# Patient Record
Sex: FEMALE | Race: WHITE | NOT HISPANIC OR LATINO | Employment: OTHER | URBAN - METROPOLITAN AREA
[De-identification: names, ages, dates, MRNs, and addresses within clinical notes are randomized per-mention and may not be internally consistent; named-entity substitution may affect disease eponyms.]

---

## 2017-05-23 ENCOUNTER — TRANSCRIBE ORDERS (OUTPATIENT)
Dept: ADMINISTRATIVE | Facility: HOSPITAL | Age: 77
End: 2017-05-23

## 2017-05-23 ENCOUNTER — HOSPITAL ENCOUNTER (OUTPATIENT)
Dept: ULTRASOUND IMAGING | Facility: HOSPITAL | Age: 77
Discharge: HOME | End: 2017-05-23
Admitting: NURSE PRACTITIONER

## 2017-05-23 DIAGNOSIS — M25.561 POSTERIOR RIGHT KNEE PAIN: Primary | ICD-10-CM

## 2017-05-23 DIAGNOSIS — M25.561 POSTERIOR RIGHT KNEE PAIN: ICD-10-CM

## 2017-05-23 PROCEDURE — 76882 US LMTD JT/FCL EVL NVASC XTR: CPT

## 2017-06-15 ENCOUNTER — TRANSCRIBE ORDERS (OUTPATIENT)
Dept: MAMMOGRAPHY | Facility: HOSPITAL | Age: 77
End: 2017-06-15

## 2017-06-15 DIAGNOSIS — Z12.31 VISIT FOR SCREENING MAMMOGRAM: Primary | ICD-10-CM

## 2017-07-11 ENCOUNTER — HOSPITAL ENCOUNTER (OUTPATIENT)
Dept: MAMMOGRAPHY | Facility: HOSPITAL | Age: 77
Discharge: HOME | End: 2017-07-11
Attending: FAMILY MEDICINE | Admitting: FAMILY MEDICINE

## 2017-07-11 DIAGNOSIS — Z12.31 VISIT FOR SCREENING MAMMOGRAM: ICD-10-CM

## 2017-07-11 PROCEDURE — G0202 SCR MAMMO BI INCL CAD: HCPCS | Performed by: RADIOLOGY

## 2017-07-11 PROCEDURE — G0202 SCR MAMMO BI INCL CAD: HCPCS

## 2017-07-11 PROCEDURE — 77063 BREAST TOMOSYNTHESIS BI: CPT | Performed by: RADIOLOGY

## 2017-07-11 PROCEDURE — 77063 BREAST TOMOSYNTHESIS BI: CPT

## 2017-07-20 ENCOUNTER — TRANSCRIBE ORDERS (OUTPATIENT)
Dept: MAMMOGRAPHY | Facility: HOSPITAL | Age: 77
End: 2017-07-20

## 2017-07-20 ENCOUNTER — HOSPITAL ENCOUNTER (OUTPATIENT)
Dept: ULTRASOUND IMAGING | Facility: HOSPITAL | Age: 77
Discharge: HOME | End: 2017-07-20
Admitting: FAMILY MEDICINE

## 2017-07-20 DIAGNOSIS — R92.8 ABNORMAL MAMMOGRAM: Primary | ICD-10-CM

## 2017-07-20 DIAGNOSIS — R92.8 ABNORMAL MAMMOGRAM: ICD-10-CM

## 2017-07-20 PROCEDURE — 76642 ULTRASOUND BREAST LIMITED: CPT

## 2017-07-20 PROCEDURE — 76642 ULTRASOUND BREAST LIMITED: CPT | Performed by: RADIOLOGY

## 2017-07-26 ENCOUNTER — HOSPITAL ENCOUNTER (OUTPATIENT)
Dept: GENERAL RADIOLOGY | Facility: HOSPITAL | Age: 77
Discharge: HOME | End: 2017-07-26
Attending: FAMILY MEDICINE | Admitting: FAMILY MEDICINE

## 2017-07-26 ENCOUNTER — TRANSCRIBE ORDERS (OUTPATIENT)
Dept: ADMINISTRATIVE | Facility: HOSPITAL | Age: 77
End: 2017-07-26

## 2017-07-26 DIAGNOSIS — M25.531 RIGHT WRIST PAIN: Primary | ICD-10-CM

## 2017-07-26 DIAGNOSIS — M25.531 RIGHT WRIST PAIN: ICD-10-CM

## 2017-07-26 PROCEDURE — 73110 X-RAY EXAM OF WRIST: CPT

## 2017-08-30 ENCOUNTER — OFFICE VISIT (OUTPATIENT)
Dept: SLEEP MEDICINE | Facility: HOSPITAL | Age: 77
End: 2017-08-30

## 2017-08-30 VITALS
OXYGEN SATURATION: 96 % | SYSTOLIC BLOOD PRESSURE: 130 MMHG | WEIGHT: 137 LBS | HEIGHT: 56 IN | HEART RATE: 88 BPM | BODY MASS INDEX: 30.82 KG/M2 | DIASTOLIC BLOOD PRESSURE: 74 MMHG

## 2017-08-30 DIAGNOSIS — G47.33 OBSTRUCTIVE SLEEP APNEA, ADULT: Primary | ICD-10-CM

## 2017-08-30 PROCEDURE — 99213 OFFICE O/P EST LOW 20 MIN: CPT | Performed by: INTERNAL MEDICINE

## 2017-08-30 NOTE — PATIENT INSTRUCTIONS
Sleep Apnea  Sleep apnea is a condition in which breathing pauses or becomes shallow during sleep. Episodes of sleep apnea usually last 10 seconds or longer, and they may occur as many as 20 times an hour. Sleep apnea disrupts your sleep and keeps your body from getting the rest that it needs. This condition can increase your risk of certain health problems, including:  · Heart attack.  · Stroke.  · Obesity.  · Diabetes.  · Heart failure.  · Irregular heartbeat.  There are three kinds of sleep apnea:  · Obstructive sleep apnea. This kind is caused by a blocked or collapsed airway.  · Central sleep apnea. This kind happens when the part of the brain that controls breathing does not send the correct signals to the muscles that control breathing.  · Mixed sleep apnea. This is a combination of obstructive and central sleep apnea.  CAUSES  The most common cause of this condition is a collapsed or blocked airway. An airway can collapse or become blocked if:  · Your throat muscles are abnormally relaxed.  · Your tongue and tonsils are larger than normal.  · You are overweight.  · Your airway is smaller than normal.  RISK FACTORS  This condition is more likely to develop in people who:  · Are overweight.  · Smoke.  · Have a smaller than normal airway.  · Are elderly.  · Are male.  · Drink alcohol.  · Take sedatives or tranquilizers.  · Have a family history of sleep apnea.  SYMPTOMS  Symptoms of this condition include:  · Trouble staying asleep.  · Daytime sleepiness and tiredness.  · Irritability.  · Loud snoring.  · Morning headaches.  · Trouble concentrating.  · Forgetfulness.  · Decreased interest in sex.  · Unexplained sleepiness.  · Mood swings.  · Personality changes.  · Feelings of depression.  · Waking up often during the night to urinate.  · Dry mouth.  · Sore throat.  DIAGNOSIS  This condition may be diagnosed with:  · A medical history.  · A physical exam.  · A series of tests that are done while you are  sleeping (sleep study). These tests are usually done in a sleep lab, but they may also be done at home.  TREATMENT  Treatment for this condition aims to restore normal breathing and to ease symptoms during sleep. It may involve managing health issues that can affect breathing, such as high blood pressure or obesity. Treatment may include:  · Sleeping on your side.  · Using a decongestant if you have nasal congestion.  · Avoiding the use of depressants, including alcohol, sedatives, and narcotics.  · Losing weight if you are overweight.  · Making changes to your diet.  · Quitting smoking.  · Using a device to open your airway while you sleep, such as:    An oral appliance. This is a custom-made mouthpiece that shifts your lower jaw forward.    A continuous positive airway pressure (CPAP) device. This device delivers oxygen to your airway through a mask.    A nasal expiratory positive airway pressure (EPAP) device. This device has valves that you put into each nostril.    A bi-level positive airway pressure (BPAP) device. This device delivers oxygen to your airway through a mask.  · Surgery if other treatments do not work. During surgery, excess tissue is removed to create a wider airway.  It is important to get treatment for sleep apnea. Without treatment, this condition can lead to:  · High blood pressure.  · Coronary artery disease.  · (Men) An inability to achieve or maintain an erection (impotence).  · Reduced thinking abilities.  HOME CARE INSTRUCTIONS  · Make any lifestyle changes that your health care provider recommends.  · Eat a healthy, well-balanced diet.  · Take over-the-counter and prescription medicines only as told by your health care provider.  · Avoid using depressants, including alcohol, sedatives, and narcotics.  · Take steps to lose weight if you are overweight.  · If you were given a device to open your airway while you sleep, use it only as told by your health care provider.  · Do not use any  tobacco products, such as cigarettes, chewing tobacco, and e-cigarettes. If you need help quitting, ask your health care provider.  · Keep all follow-up visits as told by your health care provider. This is important.  SEEK MEDICAL CARE IF:  · The device that you received to open your airway during sleep is uncomfortable or does not seem to be working.  · Your symptoms do not improve.  · Your symptoms get worse.  SEEK IMMEDIATE MEDICAL CARE IF:  · You develop chest pain.  · You develop shortness of breath.  · You develop discomfort in your back, arms, or stomach.  · You have trouble speaking.  · You have weakness on one side of your body.  · You have drooping in your face.  These symptoms may represent a serious problem that is an emergency. Do not wait to see if the symptoms will go away. Get medical help right away. Call your local emergency services (911 in the [place] Do not drive yourself to the hospital.     This information is not intended to replace advice given to you by your health care provider. Make sure you discuss any questions you have with your health care provider.     Document Released: 12/08/2003 Document Revised: 04/10/2017 Document Reviewed: 09/26/2016  [employer] Interactive Patient Education ©2017 [employer]

## 2017-08-30 NOTE — PROGRESS NOTES
Subjective   Mary Irving is a 76 y.o. female is here today for follow-up.  She is followed here with obstructive sleep apnea.  Her primary care physician is Dr. Sanchez.    History of Present Illness  Patient was last seen in this clinic August 25, 2016 by .  She had severe obstructive sleep apnea on her previous sleep study and has obesity.  She says she's been doing well with her machine.  She has occasional changes in humidifier level says she still feels somewhat tired during the day.  She denies any real problems however with the mask or machine.  Past Medical History:   Diagnosis Date   • Arthritis    • Disease of thyroid gland    • Hypertension        Past Surgical History:   Procedure Laterality Date   • HYSTERECTOMY      AGE 42   • OOPHORECTOMY Bilateral     AGE 42           Current Outpatient Prescriptions:   •  atorvastatin (LIPITOR) 40 MG tablet, , Disp: , Rfl:   •  buPROPion XL (WELLBUTRIN XL) 150 MG 24 hr tablet, , Disp: , Rfl:   •  FLUoxetine (PROzac) 20 MG capsule, , Disp: , Rfl:   •  levothyroxine (SYNTHROID, LEVOTHROID) 88 MCG tablet, , Disp: , Rfl:   •  losartan-hydrochlorothiazide (HYZAAR) 100-25 MG per tablet, , Disp: , Rfl:   •  VENTOLIN  (90 BASE) MCG/ACT inhaler, , Disp: , Rfl:     Allergies   Allergen Reactions   • Sulfa Antibiotics        The following portions of the patient's history were reviewed and updated as appropriate: allergies, current medications and problem list.    Review of Systems   Constitutional: Negative.    HENT: Positive for congestion, postnasal drip and sinus pressure.    Eyes: Negative.    Respiratory: Positive for wheezing.    Cardiovascular: Positive for palpitations.   Gastrointestinal: Negative.    Endocrine: Positive for cold intolerance.   Genitourinary: Positive for frequency.   Musculoskeletal: Positive for arthralgias and joint swelling.   Skin: Negative.    Allergic/Immunologic: Positive for environmental allergies.   Neurological:  "Positive for headaches.   Hematological: Negative.    Psychiatric/Behavioral: Negative.      [place] scores 14/24.  Objective     /74  Pulse 88  Ht 56\" (142.2 cm)  Wt 137 lb (62.1 kg)  LMP  (LMP Unknown)  SpO2 96%  BMI 30.71 kg/m2    Physical Exam   Constitutional: She is oriented to person, place, and time. She appears well-developed and well-nourished.   She is obese.   HENT:   Head: Normocephalic and atraumatic.   She has Mallampati class II anatomy.   Eyes: EOM are normal. Pupils are equal, round, and reactive to light.   Neck: Normal range of motion. Neck supple.   Cardiovascular: Normal rate, regular rhythm and normal heart sounds.    Pulmonary/Chest: Effort normal and breath sounds normal.   Abdominal: Soft. Bowel sounds are normal.   Musculoskeletal: Normal range of motion. She exhibits no edema.   Neurological: She is alert and oriented to person, place, and time.   Skin: Skin is warm and dry.   Psychiatric: She has a normal mood and affect. Her behavior is normal.    download from her machine for the past 6 months shows usage 98% the time.  She is using 8 hours 12 Max per day.  Her AHI is 2.9 which is normal.  Her 90% pressure is 10.  Her humidity level does seem to change at times.  The patient states she does not know how to adjust      Assessment/Plan   Mary was seen today for follow-up.    Diagnoses and all orders for this visit:    Obstructive sleep apnea, adult  -     CPAP Therapy    Patient seems be doing fairly well with her machine.  We will continue on her current pressures.  She seems to have good control of her respiratory events.  She does still complain of some sleepiness but cannot identify anything else that seems to be disrupting her sleep.  She is discussed with her [employer] company had a make sure her humidifier is adjusted properly.  We will renew her supplies.  She is encouraged to lose weight.  She is encouraged to avoid alcohol and sedatives close to bedtime.  She is " encouraged practice lateral position sleep.  We will see her back in 1 year.             Barron Colunga MD [facility]  Sleep Medicine  Pulmonary and Critical Care Medicine      08/30/17  3:12 PM

## 2017-08-31 ENCOUNTER — HOSPITAL ENCOUNTER (OUTPATIENT)
Dept: MAMMOGRAPHY | Facility: HOSPITAL | Age: 77
Discharge: HOME | End: 2017-08-31
Attending: FAMILY MEDICINE | Admitting: FAMILY MEDICINE

## 2017-08-31 ENCOUNTER — HOSPITAL ENCOUNTER (OUTPATIENT)
Dept: ULTRASOUND IMAGING | Facility: HOSPITAL | Age: 77
Discharge: HOME | End: 2017-08-31

## 2017-08-31 DIAGNOSIS — R92.8 ABNORMAL MAMMOGRAM: ICD-10-CM

## 2017-08-31 PROCEDURE — G0204 DX MAMMO INCL CAD BI: HCPCS

## 2017-08-31 PROCEDURE — G0204 DX MAMMO INCL CAD BI: HCPCS | Performed by: RADIOLOGY

## 2017-08-31 PROCEDURE — 76642 ULTRASOUND BREAST LIMITED: CPT | Performed by: RADIOLOGY

## 2017-08-31 PROCEDURE — 76642 ULTRASOUND BREAST LIMITED: CPT

## 2017-08-31 PROCEDURE — G0279 TOMOSYNTHESIS, MAMMO: HCPCS | Performed by: RADIOLOGY

## 2017-08-31 PROCEDURE — G0279 TOMOSYNTHESIS, MAMMO: HCPCS

## 2018-03-27 ENCOUNTER — HOSPITAL ENCOUNTER (OUTPATIENT)
Dept: GENERAL RADIOLOGY | Facility: HOSPITAL | Age: 78
Discharge: HOME | End: 2018-03-27
Admitting: NURSE PRACTITIONER

## 2018-03-27 ENCOUNTER — TRANSCRIBE ORDERS (OUTPATIENT)
Dept: ADMINISTRATIVE | Facility: HOSPITAL | Age: 78
End: 2018-03-27

## 2018-03-27 DIAGNOSIS — M25.531 RIGHT WRIST PAIN: ICD-10-CM

## 2018-03-27 DIAGNOSIS — M25.531 RIGHT WRIST PAIN: Primary | ICD-10-CM

## 2018-03-27 PROCEDURE — 73110 X-RAY EXAM OF WRIST: CPT

## 2018-05-31 ENCOUNTER — TRANSCRIBE ORDERS (OUTPATIENT)
Dept: ADMINISTRATIVE | Facility: HOSPITAL | Age: 78
End: 2018-05-31

## 2018-05-31 DIAGNOSIS — Z12.31 VISIT FOR SCREENING MAMMOGRAM: Primary | ICD-10-CM

## 2018-07-13 ENCOUNTER — HOSPITAL ENCOUNTER (OUTPATIENT)
Dept: MAMMOGRAPHY | Facility: HOSPITAL | Age: 78
Discharge: HOME | End: 2018-07-13
Attending: FAMILY MEDICINE | Admitting: FAMILY MEDICINE

## 2018-07-13 DIAGNOSIS — Z12.31 VISIT FOR SCREENING MAMMOGRAM: ICD-10-CM

## 2018-07-13 PROCEDURE — 77063 BREAST TOMOSYNTHESIS BI: CPT | Performed by: RADIOLOGY

## 2018-07-13 PROCEDURE — 77067 SCR MAMMO BI INCL CAD: CPT

## 2018-07-13 PROCEDURE — 77063 BREAST TOMOSYNTHESIS BI: CPT

## 2018-07-13 PROCEDURE — 77067 SCR MAMMO BI INCL CAD: CPT | Performed by: RADIOLOGY

## 2018-09-04 ENCOUNTER — OFFICE VISIT (OUTPATIENT)
Dept: SLEEP MEDICINE | Facility: HOSPITAL | Age: 78
End: 2018-09-04

## 2018-09-04 VITALS
DIASTOLIC BLOOD PRESSURE: 73 MMHG | WEIGHT: 128.6 LBS | HEIGHT: 58 IN | HEART RATE: 70 BPM | OXYGEN SATURATION: 96 % | SYSTOLIC BLOOD PRESSURE: 139 MMHG | BODY MASS INDEX: 26.99 KG/M2

## 2018-09-04 DIAGNOSIS — G47.33 OSA (OBSTRUCTIVE SLEEP APNEA): Primary | ICD-10-CM

## 2018-09-04 PROCEDURE — 99213 OFFICE O/P EST LOW 20 MIN: CPT | Performed by: NURSE PRACTITIONER

## 2018-09-04 RX ORDER — OMEPRAZOLE 40 MG/1
40 CAPSULE, DELAYED RELEASE ORAL DAILY
COMMUNITY

## 2018-09-04 NOTE — PROGRESS NOTES
Subjective: Follow-up        Chief Complaint:   Chief Complaint   Patient presents with   • Follow-up       HPI:    Mary Irving is a 77 y.o. female here for follow-up of juan josé.  Patient states she is doing very well with sleep apnea therapy.  Last appointment she was seen on 8/30/2017.  Her primary care physician is Domingo Sanchez M.D.  She is sleeping approximately 8 hours nightly and does feel rested upon awakening.  She intermittently naps during the day and and also feels refreshed at that time as well.  She is having no problems with her CPAP therapy or current pressures.  [place] score 12/24.    Current medications are:   Current Outpatient Prescriptions:   •  atorvastatin (LIPITOR) 40 MG tablet, , Disp: , Rfl:   •  buPROPion XL (WELLBUTRIN XL) 150 MG 24 hr tablet, , Disp: , Rfl:   •  FLUoxetine (PROzac) 20 MG capsule, , Disp: , Rfl:   •  levothyroxine (SYNTHROID, LEVOTHROID) 88 MCG tablet, , Disp: , Rfl:   •  losartan-hydrochlorothiazide (HYZAAR) 100-25 MG per tablet, , Disp: , Rfl:   •  metFORMIN (GLUCOPHAGE) 500 MG tablet, Take 500 mg by mouth 2 (Two) Times a Day With Meals., Disp: , Rfl:   •  omeprazole (priLOSEC) 40 MG capsule, Take 40 mg by mouth Daily., Disp: , Rfl:   •  VENTOLIN  (90 BASE) MCG/ACT inhaler, , Disp: , Rfl: .      The patient's relevant past medical, surgical, family and social history were reviewed and updated in Epic as appropriate.       Review of Systems   Constitutional: Negative for fatigue.   HENT: Positive for congestion, postnasal drip and rhinorrhea.    Eyes: Positive for visual disturbance.   Respiratory: Positive for apnea, cough, shortness of breath and wheezing.    Cardiovascular: Positive for palpitations.   Musculoskeletal: Positive for arthralgias and back pain.   Allergic/Immunologic: Positive for environmental allergies.   Neurological: Positive for headaches.   Hematological: Bruises/bleeds easily.   Psychiatric/Behavioral: Positive for dysphoric mood and sleep  disturbance. The patient is nervous/anxious.    All other systems reviewed and are negative.        Objective:    Physical Exam   Constitutional: She is oriented to person, place, and time.   HENT:   Head: Normocephalic.   Eyes: Conjunctivae are normal.   Neck: Neck supple. No thyromegaly present.   Cardiovascular: Normal rate and regular rhythm.    Pulmonary/Chest: Effort normal and breath sounds normal.   Lymphadenopathy:     She has no cervical adenopathy.   Neurological: She is alert and oriented to person, place, and time.   Skin: Skin is warm and dry.   Psychiatric: She has a normal mood and affect. Her behavior is normal. Judgment and thought content normal.   Nursing note and vitals reviewed.    Compliance and download reviewed with patient.  Her greater than 4 hour usage is 96.1%.  AHI controlled at 4.5.  90% pressure 10.4 cm H2O.    ASSESSMENT/PLAN    Mary was seen today for follow-up.    Diagnoses and all orders for this visit:    ZHAO (obstructive sleep apnea)  -     CPAP Therapy            1. Counseled patient regarding multimodal approach with healthy nutrition, healthy sleep, regular physical activity, social activities, counseling, and medications. Encouraged to practice lateral sleep position. Avoid alcohol and sedatives close to bedtime.  2. Refill supplies ×1 year.  3. Return to clinic one year or sooner as symptoms warrant.    I have reviewed the results of my evaluation and impression and discussed my recommendations in detail with the patient.      Signed by  VALENTINA Ramos    September 4, 2018      CC: Domingo Sanchez MD          No ref. provider found

## 2018-09-04 NOTE — PATIENT INSTRUCTIONS

## 2019-02-04 ENCOUNTER — LAB REQUISITION (OUTPATIENT)
Dept: LAB | Facility: HOSPITAL | Age: 79
End: 2019-02-04

## 2019-02-04 DIAGNOSIS — M20.41 OTHER HAMMER TOE(S) (ACQUIRED), RIGHT FOOT: ICD-10-CM

## 2019-02-04 LAB — POTASSIUM BLDA-SCNC: 3.86 MMOL/L (ref 3.5–5.3)

## 2019-02-04 PROCEDURE — 84132 ASSAY OF SERUM POTASSIUM: CPT | Performed by: PODIATRIST

## 2019-08-26 ENCOUNTER — TRANSCRIBE ORDERS (OUTPATIENT)
Dept: ADMINISTRATIVE | Facility: HOSPITAL | Age: 79
End: 2019-08-26

## 2019-08-26 DIAGNOSIS — Z12.31 VISIT FOR SCREENING MAMMOGRAM: Primary | ICD-10-CM

## 2019-09-20 ENCOUNTER — OFFICE VISIT (OUTPATIENT)
Dept: SLEEP MEDICINE | Facility: HOSPITAL | Age: 79
End: 2019-09-20

## 2019-09-20 VITALS
HEIGHT: 58 IN | HEART RATE: 67 BPM | SYSTOLIC BLOOD PRESSURE: 131 MMHG | OXYGEN SATURATION: 96 % | BODY MASS INDEX: 26.13 KG/M2 | DIASTOLIC BLOOD PRESSURE: 74 MMHG | WEIGHT: 124.5 LBS

## 2019-09-20 DIAGNOSIS — G47.33 OSA (OBSTRUCTIVE SLEEP APNEA): Primary | ICD-10-CM

## 2019-09-20 PROCEDURE — 99212 OFFICE O/P EST SF 10 MIN: CPT | Performed by: NURSE PRACTITIONER

## 2019-09-20 NOTE — PROGRESS NOTES
"    Chief Complaint:   Chief Complaint   Patient presents with   • Sleep Apnea       HPI:    Mary Irving is a 78 y.o. female here for follow-up of sleep apnea.  Patient was last seen 9/4/2018.  Patient states she does well with CPAP therapy.  Patient sleeping 8 to 10 hours nightly is initially rested upon awakening.  Patient has an [place] score of 14/24.  Patient states she \"often feels tired.\"  She is compliant with CPAP and we have discussed the possibility of a low-dose stimulant.  Patient declines.  Patient does wish to continue CPAP.        Current medications are:   Current Outpatient Medications:   •  atorvastatin (LIPITOR) 40 MG tablet, , Disp: , Rfl:   •  buPROPion XL (WELLBUTRIN XL) 150 MG 24 hr tablet, , Disp: , Rfl:   •  FLUoxetine (PROzac) 20 MG capsule, , Disp: , Rfl:   •  levothyroxine (SYNTHROID, LEVOTHROID) 88 MCG tablet, , Disp: , Rfl:   •  losartan-hydrochlorothiazide (HYZAAR) 100-25 MG per tablet, , Disp: , Rfl:   •  metFORMIN (GLUCOPHAGE) 500 MG tablet, Take 500 mg by mouth 2 (Two) Times a Day With Meals., Disp: , Rfl:   •  omeprazole (priLOSEC) 40 MG capsule, Take 40 mg by mouth Daily., Disp: , Rfl:   •  VENTOLIN  (90 BASE) MCG/ACT inhaler, , Disp: , Rfl: .      The patient's relevant past medical, surgical, family and social history were reviewed and updated in Epic as appropriate.       Review of Systems   Constitutional: Positive for fatigue.   Eyes: Positive for visual disturbance.   Respiratory: Positive for apnea, cough, shortness of breath and wheezing.    Cardiovascular: Positive for palpitations.   Musculoskeletal: Positive for arthralgias and back pain.   Allergic/Immunologic: Positive for environmental allergies.   Neurological: Positive for headaches.   Hematological: Bruises/bleeds easily.   Psychiatric/Behavioral: Positive for dysphoric mood and sleep disturbance. The patient is nervous/anxious.    All other systems reviewed and are " negative.        Objective:    Physical Exam   Constitutional: She is oriented to person, place, and time. She appears well-developed and well-nourished.   HENT:   Head: Normocephalic and atraumatic.   Mouth/Throat: Oropharynx is clear and moist.   Class 2 airway   Eyes: Conjunctivae are normal.   Neck: Neck supple. No thyromegaly present.   Cardiovascular: Normal rate and regular rhythm.   Pulmonary/Chest: Effort normal and breath sounds normal.   Lymphadenopathy:     She has no cervical adenopathy.   Neurological: She is alert and oriented to person, place, and time.   Skin: Skin is warm and dry.   Psychiatric: She has a normal mood and affect. Her behavior is normal. Judgment and thought content normal.   Nursing note and vitals reviewed.    176/1 80 days of use.  Greater than 4-hour use 97.8%.  9% pressure 10.4.  AHI 3.3.  Download reviewed with patient.    ASSESSMENT/PLAN    Mary was seen today for sleep apnea.    Diagnoses and all orders for this visit:    ZHAO (obstructive sleep apnea)  -     CPAP Therapy            1. Counseled patient regarding multimodal approach with healthy nutrition, healthy sleep, regular physical activity, social activities, counseling, and medications. Encouraged to practice lateral sleep position. Avoid alcohol and sedatives close to bedtime.  2.   Refill supplies x1 year.  Patient return to clinic in 1 year.  I have reviewed the results of my evaluation and impression and discussed my recommendations in detail with the patient.      Signed by  VALENTINA Ramos    September 20, 2019      CC: Domingo Sanchez MD          No ref. provider found

## 2019-10-15 ENCOUNTER — HOSPITAL ENCOUNTER (OUTPATIENT)
Dept: MAMMOGRAPHY | Facility: HOSPITAL | Age: 79
Discharge: HOME | End: 2019-10-15
Admitting: FAMILY MEDICINE

## 2019-10-15 DIAGNOSIS — Z12.31 VISIT FOR SCREENING MAMMOGRAM: ICD-10-CM

## 2019-10-15 PROCEDURE — 77063 BREAST TOMOSYNTHESIS BI: CPT | Performed by: RADIOLOGY

## 2019-10-15 PROCEDURE — 77067 SCR MAMMO BI INCL CAD: CPT | Performed by: RADIOLOGY

## 2019-10-15 PROCEDURE — 77067 SCR MAMMO BI INCL CAD: CPT

## 2019-10-15 PROCEDURE — 77063 BREAST TOMOSYNTHESIS BI: CPT

## 2020-09-08 ENCOUNTER — TRANSCRIBE ORDERS (OUTPATIENT)
Dept: ADMINISTRATIVE | Facility: HOSPITAL | Age: 80
End: 2020-09-08

## 2020-09-08 DIAGNOSIS — Z12.31 VISIT FOR SCREENING MAMMOGRAM: Primary | ICD-10-CM

## 2020-10-05 ENCOUNTER — OFFICE VISIT (OUTPATIENT)
Dept: SLEEP MEDICINE | Facility: HOSPITAL | Age: 80
End: 2020-10-05

## 2020-10-05 VITALS
HEART RATE: 86 BPM | BODY MASS INDEX: 25.02 KG/M2 | HEIGHT: 58 IN | DIASTOLIC BLOOD PRESSURE: 70 MMHG | WEIGHT: 119.2 LBS | OXYGEN SATURATION: 96 % | SYSTOLIC BLOOD PRESSURE: 127 MMHG

## 2020-10-05 DIAGNOSIS — G47.33 OSA (OBSTRUCTIVE SLEEP APNEA): Primary | ICD-10-CM

## 2020-10-05 PROCEDURE — 99212 OFFICE O/P EST SF 10 MIN: CPT | Performed by: NURSE PRACTITIONER

## 2020-10-05 NOTE — PROGRESS NOTES
Chief Complaint:   Chief Complaint   Patient presents with   • Follow-up       HPI:    Mary Irving is a 79 y.o. female here for follow-up of sleep apnea.  Patient was last seen 9/20/2019.  Patient states she is doing well with CPAP therapy.  Patient is sleeping 8 hours nightly and does feel refreshed upon awakening.  Patient states she will go to sleep within 5 minutes.  Patient does get up 2-3 times nightly to use the restroom.  Patient has an [place] score of 13/24.  Patient has no concerns or complaints with CPAP and does wish to continue.      Past Medical History:   Diagnosis Date   • Arthritis    • Disease of thyroid gland    • Hypertension        Current medications are:   Current Outpatient Medications:   •  atorvastatin (LIPITOR) 40 MG tablet, , Disp: , Rfl:   •  buPROPion XL (WELLBUTRIN XL) 150 MG 24 hr tablet, , Disp: , Rfl:   •  FLUoxetine (PROzac) 20 MG capsule, , Disp: , Rfl:   •  levothyroxine (SYNTHROID, LEVOTHROID) 88 MCG tablet, , Disp: , Rfl:   •  losartan-hydrochlorothiazide (HYZAAR) 100-25 MG per tablet, , Disp: , Rfl:   •  metFORMIN (GLUCOPHAGE) 500 MG tablet, Take 500 mg by mouth 2 (Two) Times a Day With Meals., Disp: , Rfl:   •  omeprazole (priLOSEC) 40 MG capsule, Take 40 mg by mouth Daily., Disp: , Rfl:   •  VENTOLIN  (90 BASE) MCG/ACT inhaler, , Disp: , Rfl: .      The patient's relevant past medical, surgical, family and social history were reviewed and updated in Epic as appropriate.       Review of Systems   Eyes: Positive for visual disturbance.   Respiratory: Positive for apnea, cough, shortness of breath and wheezing.    Cardiovascular: Positive for palpitations.   Musculoskeletal: Positive for arthralgias and back pain.   Allergic/Immunologic: Positive for environmental allergies.   Neurological: Positive for headaches.   Psychiatric/Behavioral: Positive for dysphoric mood and sleep disturbance. The patient is nervous/anxious.    All other systems reviewed and are  negative.        Objective:    Physical Exam  Constitutional:       Appearance: Normal appearance.   HENT:      Head: Normocephalic and atraumatic.      Mouth/Throat:      Mouth: Mucous membranes are moist.      Pharynx: Oropharynx is clear.      Comments: Class 2 airway  Eyes:      Conjunctiva/sclera: Conjunctivae normal.   Pulmonary:      Effort: Pulmonary effort is normal. No respiratory distress.   Skin:     General: Skin is warm and dry.      Coloration: Skin is not pale.      Findings: No erythema.   Neurological:      Mental Status: She is alert and oriented to person, place, and time.   Psychiatric:         Mood and Affect: Mood normal.         Behavior: Behavior normal.         Thought Content: Thought content normal.         Judgment: Judgment normal.       No download available    ASSESSMENT/PLAN    Mary was seen today for follow-up.    Diagnoses and all orders for this visit:    ZHAO (obstructive sleep apnea)  -     CPAP Therapy            1. Counseled patient regarding multimodal approach with healthy nutrition, healthy sleep, regular physical activity, social activities, counseling, and medications. Encouraged to practice lateral  sleep position. Avoid alcohol and sedatives close to bedtime.  2. We have called patient's DME to please fax her download I will see patient back in 1 year    I have reviewed the results of my evaluation and impression and discussed my recommendations in detail with the patient.      Signed by  VALENTINA Ramos    October 5, 2020      CC: Domingo Sanchez MD          No ref. provider found

## 2020-12-05 ENCOUNTER — APPOINTMENT (OUTPATIENT)
Dept: MAMMOGRAPHY | Facility: HOSPITAL | Age: 80
End: 2020-12-05

## 2020-12-07 ENCOUNTER — HOSPITAL ENCOUNTER (OUTPATIENT)
Dept: MAMMOGRAPHY | Facility: HOSPITAL | Age: 80
Discharge: HOME | End: 2020-12-07
Admitting: FAMILY MEDICINE

## 2020-12-07 DIAGNOSIS — Z12.31 VISIT FOR SCREENING MAMMOGRAM: ICD-10-CM

## 2020-12-07 PROCEDURE — 77067 SCR MAMMO BI INCL CAD: CPT | Performed by: RADIOLOGY

## 2020-12-07 PROCEDURE — 77063 BREAST TOMOSYNTHESIS BI: CPT

## 2020-12-07 PROCEDURE — 77063 BREAST TOMOSYNTHESIS BI: CPT | Performed by: RADIOLOGY

## 2020-12-07 PROCEDURE — 77067 SCR MAMMO BI INCL CAD: CPT

## 2021-10-19 ENCOUNTER — OFFICE VISIT (OUTPATIENT)
Dept: SLEEP MEDICINE | Facility: HOSPITAL | Age: 81
End: 2021-10-19

## 2021-10-19 VITALS
HEART RATE: 73 BPM | OXYGEN SATURATION: 95 % | HEIGHT: 58 IN | SYSTOLIC BLOOD PRESSURE: 136 MMHG | WEIGHT: 114.6 LBS | BODY MASS INDEX: 24.05 KG/M2 | DIASTOLIC BLOOD PRESSURE: 67 MMHG

## 2021-10-19 DIAGNOSIS — G47.33 OSA (OBSTRUCTIVE SLEEP APNEA): Primary | ICD-10-CM

## 2021-10-19 DIAGNOSIS — G47.30 HYPERSOMNIA WITH SLEEP APNEA: ICD-10-CM

## 2021-10-19 DIAGNOSIS — G47.10 HYPERSOMNIA WITH SLEEP APNEA: ICD-10-CM

## 2021-10-19 DIAGNOSIS — E03.9 HYPOTHYROIDISM, UNSPECIFIED TYPE: ICD-10-CM

## 2021-10-19 PROCEDURE — 99213 OFFICE O/P EST LOW 20 MIN: CPT | Performed by: NURSE PRACTITIONER

## 2021-10-19 NOTE — PROGRESS NOTES
Chief Complaint:   Chief Complaint   Patient presents with   • Follow-up       HPI:    Mary Irving is a 81 y.o. female here for follow-up of sleep apnea.  Patient has a history of dyslipidemia, hypertension, obesity, hypothyroidism, depression, osteoarthritis, mild intermittent asthma and severe sleep apnea.  Patient was last seen 10/5/2020.  Patient states she continues to do well with CPAP therapy.  Patient is sleeping 8 hours nightly and does feel tired  upon awakening but not as bad as before cpap, she does attribute to hypothyroidism, has recently( 2 days) started an appropriate dose..  Patient will go to sleep within 5 minutes and does get up 2-3 times in the night.  [place] score is 18/24.  Patient has no concerns or complaints regarding CPAP use and wishes to continue therapy.  Patient machine is greater than 5 years old and does wish to have an order today for a new machine.    Did speak today about adding stimulant therapy although I would like for her thyroid medications to be within normal limits.  I will reevaluate on next visit and send an order for new machine.    Current medications are:   Current Outpatient Medications:   •  atorvastatin (LIPITOR) 40 MG tablet, , Disp: , Rfl:   •  buPROPion XL (WELLBUTRIN XL) 150 MG 24 hr tablet, , Disp: , Rfl:   •  FLUoxetine (PROzac) 20 MG capsule, , Disp: , Rfl:   •  levothyroxine (SYNTHROID, LEVOTHROID) 88 MCG tablet, , Disp: , Rfl:   •  losartan-hydrochlorothiazide (HYZAAR) 100-25 MG per tablet, , Disp: , Rfl:   •  metFORMIN (GLUCOPHAGE) 500 MG tablet, Take 500 mg by mouth 2 (Two) Times a Day With Meals., Disp: , Rfl:   •  omeprazole (priLOSEC) 40 MG capsule, Take 40 mg by mouth Daily., Disp: , Rfl:   •  VENTOLIN  (90 BASE) MCG/ACT inhaler, , Disp: , Rfl: .      The patient's relevant past medical, surgical, family and social history were reviewed and updated in Epic as appropriate.       Review of Systems   Constitutional: Positive for  fatigue.   Eyes: Positive for visual disturbance.   Respiratory: Positive for apnea, cough, shortness of breath and wheezing.    Cardiovascular: Positive for palpitations.   Gastrointestinal:        Heartburn   Musculoskeletal: Positive for arthralgias and back pain.   Allergic/Immunologic: Positive for environmental allergies.   Neurological: Positive for headaches.   Psychiatric/Behavioral: Positive for sleep disturbance.   All other systems reviewed and are negative.        Objective:    Physical Exam  Constitutional:       Appearance: Normal appearance.   HENT:      Head: Normocephalic and atraumatic.      Mouth/Throat:      Pharynx: Oropharynx is clear.      Comments: Mallampati 2 anatomy  Eyes:      Conjunctiva/sclera: Conjunctivae normal.   Cardiovascular:      Rate and Rhythm: Regular rhythm.   Pulmonary:      Effort: Pulmonary effort is normal.      Breath sounds: Normal breath sounds.   Skin:     General: Skin is warm and dry.   Neurological:      Mental Status: She is alert and oriented to person, place, and time.   Psychiatric:         Mood and Affect: Mood normal.         Behavior: Behavior normal.         Thought Content: Thought content normal.         Judgment: Judgment normal.     29/30 days of use  Greater than 4-hour use 93.3  90% pressure 10.5  AHI 4.2  Settings 8-18      ASSESSMENT/PLAN    Diagnoses and all orders for this visit:    1. ZHAO (obstructive sleep apnea) (Primary)  -     CPAP Therapy    2. Hypersomnia with sleep apnea  -     CPAP Therapy    3. Hypothyroidism, unspecified type            1. Counseled patient regarding multimodal approach with healthy nutrition, healthy sleep, regular physical activity, social activities, counseling, and medications. Encouraged to practice lateral sleep position. Avoid alcohol and sedatives close to bedtime.  2.   Refill supplies x1 year.  Return to clinic 31 to 90 days  or sooner if symptoms warrant.  I have reviewed the results of my evaluation and  impression and discussed my recommendations in detail with the patient.  We will review hypersomnia on next appointment    Signed by  Jenny Bond, APRN    October 19, 2021      CC: Domingo Sanchez MD          No ref. provider found

## 2021-11-09 ENCOUNTER — TRANSCRIBE ORDERS (OUTPATIENT)
Dept: ADMINISTRATIVE | Facility: HOSPITAL | Age: 81
End: 2021-11-09

## 2021-11-09 DIAGNOSIS — Z12.31 VISIT FOR SCREENING MAMMOGRAM: Primary | ICD-10-CM

## 2021-11-10 ENCOUNTER — TRANSCRIBE ORDERS (OUTPATIENT)
Dept: ADMINISTRATIVE | Facility: HOSPITAL | Age: 81
End: 2021-11-10

## 2021-11-10 ENCOUNTER — HOSPITAL ENCOUNTER (OUTPATIENT)
Dept: GENERAL RADIOLOGY | Facility: HOSPITAL | Age: 81
Discharge: HOME | End: 2021-11-10
Admitting: FAMILY MEDICINE

## 2021-11-10 DIAGNOSIS — M25.531 WRIST PAIN, ACUTE, RIGHT: ICD-10-CM

## 2021-11-10 DIAGNOSIS — M25.531 WRIST PAIN, ACUTE, RIGHT: Primary | ICD-10-CM

## 2021-11-10 PROCEDURE — 73110 X-RAY EXAM OF WRIST: CPT

## 2021-12-22 ENCOUNTER — APPOINTMENT (OUTPATIENT)
Dept: MAMMOGRAPHY | Facility: HOSPITAL | Age: 81
End: 2021-12-22

## 2021-12-22 ENCOUNTER — HOSPITAL ENCOUNTER (OUTPATIENT)
Dept: MAMMOGRAPHY | Facility: HOSPITAL | Age: 81
Discharge: HOME | End: 2021-12-22
Admitting: FAMILY MEDICINE

## 2021-12-22 DIAGNOSIS — Z12.31 VISIT FOR SCREENING MAMMOGRAM: ICD-10-CM

## 2021-12-22 PROCEDURE — 77063 BREAST TOMOSYNTHESIS BI: CPT | Performed by: RADIOLOGY

## 2021-12-22 PROCEDURE — 77067 SCR MAMMO BI INCL CAD: CPT | Performed by: RADIOLOGY

## 2021-12-22 PROCEDURE — 77063 BREAST TOMOSYNTHESIS BI: CPT

## 2021-12-22 PROCEDURE — 77067 SCR MAMMO BI INCL CAD: CPT

## 2022-11-14 ENCOUNTER — TRANSCRIBE ORDERS (OUTPATIENT)
Dept: ADMINISTRATIVE | Facility: HOSPITAL | Age: 82
End: 2022-11-14

## 2022-11-14 DIAGNOSIS — Z12.31 VISIT FOR SCREENING MAMMOGRAM: Primary | ICD-10-CM

## 2022-12-27 ENCOUNTER — HOSPITAL ENCOUNTER (OUTPATIENT)
Dept: MAMMOGRAPHY | Facility: HOSPITAL | Age: 82
Discharge: HOME | End: 2022-12-27
Admitting: FAMILY MEDICINE

## 2022-12-27 DIAGNOSIS — Z12.31 VISIT FOR SCREENING MAMMOGRAM: ICD-10-CM

## 2022-12-27 PROCEDURE — 77067 SCR MAMMO BI INCL CAD: CPT | Performed by: RADIOLOGY

## 2022-12-27 PROCEDURE — 77063 BREAST TOMOSYNTHESIS BI: CPT

## 2022-12-27 PROCEDURE — 77063 BREAST TOMOSYNTHESIS BI: CPT | Performed by: RADIOLOGY

## 2022-12-27 PROCEDURE — 77067 SCR MAMMO BI INCL CAD: CPT

## 2023-09-25 ENCOUNTER — OFFICE VISIT (OUTPATIENT)
Dept: CARDIOLOGY | Facility: CLINIC | Age: 83
End: 2023-09-25

## 2023-09-25 VITALS
OXYGEN SATURATION: 97 % | DIASTOLIC BLOOD PRESSURE: 70 MMHG | WEIGHT: 116 LBS | HEART RATE: 70 BPM | HEIGHT: 56 IN | SYSTOLIC BLOOD PRESSURE: 138 MMHG | BODY MASS INDEX: 26.1 KG/M2

## 2023-09-25 DIAGNOSIS — E78.5 HYPERLIPIDEMIA, UNSPECIFIED HYPERLIPIDEMIA TYPE: Primary | ICD-10-CM

## 2023-09-25 DIAGNOSIS — R00.2 PALPITATIONS: ICD-10-CM

## 2023-09-25 DIAGNOSIS — R53.83 FATIGUE, UNSPECIFIED TYPE: ICD-10-CM

## 2023-09-25 PROCEDURE — 3078F DIAST BP <80 MM HG: CPT | Performed by: INTERNAL MEDICINE

## 2023-09-25 PROCEDURE — 99204 OFFICE O/P NEW MOD 45 MIN: CPT | Performed by: INTERNAL MEDICINE

## 2023-09-25 PROCEDURE — 3075F SYST BP GE 130 - 139MM HG: CPT | Performed by: INTERNAL MEDICINE

## 2023-09-25 NOTE — PROGRESS NOTES
"Chief Complaint  Establish Care (New Patient), Fatigue, and Shortness of Breath      Subjective   History of Present Illness    Problem List  -HLD  -Atherosclerosis (calcific atherosclerosis) appreciated non dedicated CT of aorta, femoral vessels and coronary arteries  -ZHAO    Ms. Irving a 82 year old lady with above hx.  She gets fatigued all the time and falls asleep very easily during the day time.  Compliant with CPAP.  Possible occasional dyspnea but otherwise no CV complaints.  Does exercise class few times a week.  ROS negative except for the above.         Objective   Vital Signs:  Vitals:    09/25/23 0949   BP: 138/70   Pulse: 70   SpO2: 97%     Estimated body mass index is 26.01 kg/m² as calculated from the following:    Height as of this encounter: 142.2 cm (56\").    Weight as of this encounter: 52.6 kg (116 lb).       Physical Exam  HENT:      Head: Normocephalic.   Eyes:      Extraocular Movements: Extraocular movements intact.   Cardiovascular:      Rate and Rhythm: Normal rate and regular rhythm.      Heart sounds: No murmur heard.    No gallop.   Pulmonary:      Breath sounds: Normal breath sounds.   Abdominal:      Palpations: Abdomen is soft.   Musculoskeletal:      Right lower leg: No edema.      Left lower leg: No edema.   Skin:     General: Skin is warm and dry.   Neurological:      General: No focal deficit present.      Mental Status: She is alert.   Psychiatric:         Mood and Affect: Mood normal.             Assessment   -HLD  -Atherosclerosis (calcific atherosclerosis) appreciated non dedicated CT of aorta, femoral vessels and coronary arteries  -ZHAO    Plan   -continue aspirin and statin.  Blood work  -Will get echo and 30 day monitor  -Encouraged her to follow up with sleep medicine clinic for possible titration of her CPAP machine.    -6-8 week follow up    Return in about 6 weeks (around 11/6/2023).  Dilshad Alicia MD  09/25/2023 10:22 EDT     "

## 2023-10-04 ENCOUNTER — TELEPHONE (OUTPATIENT)
Dept: CARDIOLOGY | Facility: CLINIC | Age: 83
End: 2023-10-04

## 2023-10-04 NOTE — TELEPHONE ENCOUNTER
Caller: Paris Calderon    Relationship: Self    Best call back number: [phone]     What is the best time to reach you: ANYTIME    Who are you requesting to speak with (clinical staff, provider,  specific staff member): GORDON    Do you know the name of the person who called: PARIS CALDERON    What was the call regarding: PATIENT MISSED A CALL FROM GORDON, EXT 1914. THERE IS NO NOTE IN THE CHART.

## 2023-11-09 ENCOUNTER — TELEPHONE (OUTPATIENT)
Dept: CARDIOLOGY | Facility: CLINIC | Age: 83
End: 2023-11-09

## 2023-11-09 ENCOUNTER — OFFICE VISIT (OUTPATIENT)
Dept: CARDIOLOGY | Facility: CLINIC | Age: 83
End: 2023-11-09
Payer: MEDICARE

## 2023-11-09 VITALS
BODY MASS INDEX: 26.1 KG/M2 | OXYGEN SATURATION: 96 % | DIASTOLIC BLOOD PRESSURE: 60 MMHG | WEIGHT: 116 LBS | SYSTOLIC BLOOD PRESSURE: 130 MMHG | HEART RATE: 82 BPM | HEIGHT: 56 IN

## 2023-11-09 DIAGNOSIS — R53.83 OTHER FATIGUE: Primary | ICD-10-CM

## 2023-11-09 RX ORDER — ASPIRIN 81 MG/1
81 TABLET ORAL DAILY
COMMUNITY
Start: 2023-02-24

## 2023-11-09 NOTE — TELEPHONE ENCOUNTER
----- Message from Dilshad Alicia MD sent at 11/9/2023  8:56 AM EST -----  Normal rhythm with short lived svt

## 2023-11-09 NOTE — PROGRESS NOTES
"Chief Complaint  Hyperlipidemia, unspecified hyperlipidemia type      Subjective   History of Present Illness    Problem List  -HLD  -Atherosclerosis (calcific atherosclerosis) appreciated non dedicated CT of aorta, femoral vessels and coronary arteries  -ZHAO    Ms. Irving a 82 year old lady with above hx.  She gets fatigued all the time and falls asleep very easily during the day time.  Compliant with CPAP.  Possible occasional dyspnea but otherwise no CV complaints.  Does exercise class few times a week.  ROS negative except for the above.      Update 11/9/23  Went over testing.  No changes       Objective   Vital Signs:  Vitals:    11/09/23 1353   BP: 130/60   Pulse: 82   SpO2: 96%     Estimated body mass index is 26.01 kg/m² as calculated from the following:    Height as of this encounter: 142.2 cm (56\").    Weight as of this encounter: 52.6 kg (116 lb).       Physical Exam  HENT:      Head: Normocephalic.   Eyes:      Extraocular Movements: Extraocular movements intact.   Cardiovascular:      Rate and Rhythm: Normal rate and regular rhythm.      Heart sounds: No murmur heard.     No gallop.   Pulmonary:      Breath sounds: Normal breath sounds.   Abdominal:      Palpations: Abdomen is soft.   Musculoskeletal:      Right lower leg: No edema.      Left lower leg: No edema.   Skin:     General: Skin is warm and dry.   Neurological:      General: No focal deficit present.      Mental Status: She is alert.   Psychiatric:         Mood and Affect: Mood normal.               Assessment   -HLD  -Atherosclerosis (calcific atherosclerosis) appreciated non dedicated CT of aorta, femoral vessels and coronary arteries  -ZHAO    Plan   -holter negative  -echo tomorrow  -still need blood work  -getting cpap titrated  -3-6 month follow up      No follow-ups on file.  Dilshad Alicia MD  09/25/2023 10:22 EDT     "

## 2023-11-10 ENCOUNTER — HOSPITAL ENCOUNTER (OUTPATIENT)
Dept: CARDIOLOGY | Facility: HOSPITAL | Age: 83
Discharge: HOME | End: 2023-11-10
Payer: MEDICARE

## 2023-11-10 DIAGNOSIS — E78.5 HYPERLIPIDEMIA, UNSPECIFIED HYPERLIPIDEMIA TYPE: ICD-10-CM

## 2023-11-10 DIAGNOSIS — R00.2 PALPITATIONS: ICD-10-CM

## 2023-11-10 DIAGNOSIS — R53.83 FATIGUE, UNSPECIFIED TYPE: ICD-10-CM

## 2023-11-10 LAB
BH CV ECHO MEAS - AI P1/2T: 570 MSEC
BH CV ECHO MEAS - AO MAX PG: 8.1 MMHG
BH CV ECHO MEAS - AO MEAN PG: 3.9 MMHG
BH CV ECHO MEAS - AO ROOT DIAM: 2.6 CM
BH CV ECHO MEAS - AO V2 MAX: 142.5 CM/SEC
BH CV ECHO MEAS - AO V2 VTI: 26.2 CM
BH CV ECHO MEAS - AVA(I,D): 2.11 CM2
BH CV ECHO MEAS - EDV(CUBED): 44.9 ML
BH CV ECHO MEAS - EDV(MOD-SP2): 40.9 ML
BH CV ECHO MEAS - EDV(MOD-SP4): 51.9 ML
BH CV ECHO MEAS - EF(MOD-BP): 74.4 %
BH CV ECHO MEAS - EF(MOD-SP2): 73.8 %
BH CV ECHO MEAS - EF(MOD-SP4): 74 %
BH CV ECHO MEAS - ESV(CUBED): 8.9 ML
BH CV ECHO MEAS - ESV(MOD-SP2): 10.7 ML
BH CV ECHO MEAS - ESV(MOD-SP4): 13.5 ML
BH CV ECHO MEAS - FS: 41.8 %
BH CV ECHO MEAS - IVS/LVPW: 1.06 CM
BH CV ECHO MEAS - IVSD: 0.94 CM
BH CV ECHO MEAS - LA DIMENSION: 2.8 CM
BH CV ECHO MEAS - LAT PEAK E' VEL: 7.6 CM/SEC
BH CV ECHO MEAS - LV DIASTOLIC VOL/BSA (35-75): 36.8 CM2
BH CV ECHO MEAS - LV MASS(C)D: 93.4 GRAMS
BH CV ECHO MEAS - LV MAX PG: 3.7 MMHG
BH CV ECHO MEAS - LV MEAN PG: 1.62 MMHG
BH CV ECHO MEAS - LV SYSTOLIC VOL/BSA (12-30): 9.6 CM2
BH CV ECHO MEAS - LV V1 MAX: 95.6 CM/SEC
BH CV ECHO MEAS - LV V1 VTI: 17.6 CM
BH CV ECHO MEAS - LVIDD: 3.6 CM
BH CV ECHO MEAS - LVIDS: 2.07 CM
BH CV ECHO MEAS - LVOT AREA: 3.1 CM2
BH CV ECHO MEAS - LVOT DIAM: 2 CM
BH CV ECHO MEAS - LVPWD: 0.89 CM
BH CV ECHO MEAS - MED PEAK E' VEL: 6.3 CM/SEC
BH CV ECHO MEAS - MV A MAX VEL: 108.8 CM/SEC
BH CV ECHO MEAS - MV DEC SLOPE: 441.9 CM/SEC2
BH CV ECHO MEAS - MV DEC TIME: 0.2 SEC
BH CV ECHO MEAS - MV E MAX VEL: 83.1 CM/SEC
BH CV ECHO MEAS - MV E/A: 0.76
BH CV ECHO MEAS - MV MAX PG: 4.4 MMHG
BH CV ECHO MEAS - MV MEAN PG: 1.36 MMHG
BH CV ECHO MEAS - MV P1/2T: 61.9 MSEC
BH CV ECHO MEAS - MV V2 VTI: 22.8 CM
BH CV ECHO MEAS - MVA(P1/2T): 3.6 CM2
BH CV ECHO MEAS - MVA(VTI): 2.42 CM2
BH CV ECHO MEAS - PA ACC TIME: 0.08 SEC
BH CV ECHO MEAS - RAP SYSTOLE: 3 MMHG
BH CV ECHO MEAS - RVSP: 29.2 MMHG
BH CV ECHO MEAS - SI(MOD-SP2): 21.4 ML/M2
BH CV ECHO MEAS - SI(MOD-SP4): 27.3 ML/M2
BH CV ECHO MEAS - SV(LVOT): 55.2 ML
BH CV ECHO MEAS - SV(MOD-SP2): 30.2 ML
BH CV ECHO MEAS - SV(MOD-SP4): 38.4 ML
BH CV ECHO MEAS - TAPSE (>1.6): 2.15 CM
BH CV ECHO MEAS - TR MAX PG: 26.2 MMHG
BH CV ECHO MEAS - TR MAX VEL: 255.5 CM/SEC
BH CV ECHO MEASUREMENTS AVERAGE E/E' RATIO: 11.96
BH CV XLRA - RV BASE: 3.4 CM
BH CV XLRA - RV LENGTH: 5.6 CM
BH CV XLRA - RV MID: 2.8 CM
BH CV XLRA - TDI S': 16.9 CM/SEC
LEFT ATRIUM VOLUME INDEX: 15.3 ML/M2

## 2023-11-10 PROCEDURE — 93306 TTE W/DOPPLER COMPLETE: CPT

## 2023-11-13 ENCOUNTER — TELEPHONE (OUTPATIENT)
Dept: CARDIOLOGY | Facility: CLINIC | Age: 83
End: 2023-11-13
Payer: MEDICARE

## 2023-11-13 NOTE — TELEPHONE ENCOUNTER
Spoke with patient regarding results per Dr. Alicia. All questions answered at this time and agreeable to plan.

## 2023-11-13 NOTE — TELEPHONE ENCOUNTER
----- Message from Dilshad Alicia MD sent at 11/13/2023  9:12 AM EST -----  Mild aortic valve insuficiency

## 2023-11-30 ENCOUNTER — TRANSCRIBE ORDERS (OUTPATIENT)
Dept: ADMINISTRATIVE | Facility: HOSPITAL | Age: 83
End: 2023-11-30
Payer: MEDICARE

## 2023-11-30 DIAGNOSIS — Z12.31 VISIT FOR SCREENING MAMMOGRAM: Primary | ICD-10-CM

## 2023-12-26 ENCOUNTER — OFFICE VISIT (OUTPATIENT)
Dept: SLEEP MEDICINE | Facility: CLINIC | Age: 83
End: 2023-12-26
Payer: MEDICARE

## 2023-12-26 VITALS
HEART RATE: 72 BPM | TEMPERATURE: 96.4 F | BODY MASS INDEX: 29.09 KG/M2 | DIASTOLIC BLOOD PRESSURE: 74 MMHG | WEIGHT: 129.3 LBS | SYSTOLIC BLOOD PRESSURE: 120 MMHG | OXYGEN SATURATION: 97 % | HEIGHT: 56 IN

## 2023-12-26 DIAGNOSIS — G47.10 HYPERSOMNIA WITH SLEEP APNEA: ICD-10-CM

## 2023-12-26 DIAGNOSIS — G47.33 OSA (OBSTRUCTIVE SLEEP APNEA): Primary | ICD-10-CM

## 2023-12-26 DIAGNOSIS — G47.30 HYPERSOMNIA WITH SLEEP APNEA: ICD-10-CM

## 2023-12-26 PROCEDURE — 99213 OFFICE O/P EST LOW 20 MIN: CPT | Performed by: NURSE PRACTITIONER

## 2023-12-26 PROCEDURE — 3078F DIAST BP <80 MM HG: CPT | Performed by: NURSE PRACTITIONER

## 2023-12-26 PROCEDURE — 3074F SYST BP LT 130 MM HG: CPT | Performed by: NURSE PRACTITIONER

## 2023-12-26 RX ORDER — MODAFINIL 100 MG/1
100 TABLET ORAL DAILY
Qty: 30 TABLET | Refills: 2 | Status: SHIPPED | OUTPATIENT
Start: 2023-12-26

## 2023-12-26 RX ORDER — LANOLIN ALCOHOL/MO/W.PET/CERES
1000 CREAM (GRAM) TOPICAL DAILY
COMMUNITY

## 2023-12-26 NOTE — PROGRESS NOTES
Chief Complaint:   Chief Complaint   Patient presents with    Follow-up    Sleep Apnea       HPI:    Mary Irving is a 83 y.o. female here for follow-up of sleep apnea.  Patient was last seen 10/19/2021.  Patient states other people over the holidays have said they cannot sleep in the same room with her as her machine is very loud and making weird noises.  We will do an order today for a repair or replace.  Patient states even though her AHI is within normal limits and she is compliant with CPAP she is still excessively sleepy throughout the day.  Patient puts her [place] score at 16/24.  She still getting 8 hours of sleep nightly and goes to sleep quickly.  She does get up 2-3 times in the night.  Will try low-dose stimulant therapy to see if we can improve her activity level.        Current medications are:   Current Outpatient Medications:     aspirin 81 MG EC tablet, Take 1 tablet by mouth Daily., Disp: , Rfl:     atorvastatin (LIPITOR) 40 MG tablet, 1 tablet Daily., Disp: , Rfl:     buPROPion XL (WELLBUTRIN XL) 150 MG 24 hr tablet, 1 tablet Daily., Disp: , Rfl:     Calcium 250 MG capsule, Daily., Disp: , Rfl:     FLUoxetine (PROzac) 20 MG capsule, 10 mg Daily., Disp: , Rfl:     IRON, FERROUS GLUCONATE, PO, Take  by mouth., Disp: , Rfl:     levothyroxine (SYNTHROID, LEVOTHROID) 88 MCG tablet, 100 mcg Daily., Disp: , Rfl:     losartan-hydrochlorothiazide (HYZAAR) 100-25 MG per tablet, 1 tablet Daily., Disp: , Rfl:     metFORMIN (GLUCOPHAGE) 500 MG tablet, Take 1 tablet by mouth 2 (Two) Times a Day With Meals., Disp: , Rfl:     omeprazole (priLOSEC) 40 MG capsule, Take 1 capsule by mouth 2 (Two) Times a Day., Disp: , Rfl:     ondansetron (ZOFRAN) 4 MG tablet, As Needed., Disp: , Rfl:     VENTOLIN  (90 BASE) MCG/ACT inhaler, As Needed., Disp: , Rfl:     vitamin B-12 (CYANOCOBALAMIN) 1000 MCG tablet, Take 1 tablet by mouth Daily., Disp: , Rfl:     modafinil (PROVIGIL) 100 MG tablet, Take 1 tablet by  mouth Daily., Disp: 30 tablet, Rfl: 2.      The patient's relevant past medical, surgical, family and social history were reviewed and updated in Epic as appropriate.       Review of Systems   Constitutional:  Positive for fatigue.   Eyes:  Positive for visual disturbance.   Respiratory:  Positive for apnea, cough, shortness of breath and wheezing.    Cardiovascular:  Positive for palpitations.   Gastrointestinal:         Heartburn   Musculoskeletal:  Positive for arthralgias and back pain.   Allergic/Immunologic: Positive for environmental allergies.   Neurological:  Positive for headaches.   Psychiatric/Behavioral:  Positive for sleep disturbance.    All other systems reviewed and are negative.        Objective:    Physical Exam  Constitutional:       Appearance: Normal appearance.   HENT:      Head: Normocephalic and atraumatic.      Mouth/Throat:      Comments: Mallampati 2 anatomy  Cardiovascular:      Rate and Rhythm: Normal rate and regular rhythm.   Pulmonary:      Effort: Pulmonary effort is normal.      Breath sounds: Normal breath sounds.   Skin:     General: Skin is warm and dry.   Neurological:      Mental Status: She is alert and oriented to person, place, and time.   Psychiatric:         Mood and Affect: Mood normal.         Behavior: Behavior normal.         Thought Content: Thought content normal.         Judgment: Judgment normal.         CPAP Report  88/90 days of use  Greater than 4-hour use 96%  Setting 8-18  95th percentile pressure 12.5  AHI 1.9    The  patient continues to use and benefit from CPAP therapy.    ASSESSMENT/PLAN    Diagnoses and all orders for this visit:    1. ZHAO (obstructive sleep apnea) (Primary)  -     PAP Therapy    2. Hypersomnia with sleep apnea  -     modafinil (PROVIGIL) 100 MG tablet; Take 1 tablet by mouth Daily.  Dispense: 30 tablet; Refill: 2        Counseled patient regarding multimodal approach with healthy nutrition, healthy sleep, regular physical activity,  social activities, counseling, and medications. Encouraged to practice lateral sleep position. Avoid alcohol and sedatives close to bedtime.    Refill supplies x 1 year.  Provigil 100 mg every morning #30 with 2 refills I will see patient back in 31 to 90 days we did do an order today for repair or replace as her machine is making loud noises.    I have reviewed the results of my evaluation and impression and discussed my recommendations in detail with the patient.      Signed by  VALENTINA Ramos    December 26, 2023      CC: Rosa Isela Bangura MD         No ref. provider found

## 2024-01-10 ENCOUNTER — HOSPITAL ENCOUNTER (OUTPATIENT)
Dept: GENERAL RADIOLOGY | Facility: HOSPITAL | Age: 84
Discharge: HOME | End: 2024-01-10
Admitting: FAMILY MEDICINE
Payer: MEDICARE

## 2024-01-10 ENCOUNTER — TRANSCRIBE ORDERS (OUTPATIENT)
Dept: ADMINISTRATIVE | Facility: HOSPITAL | Age: 84
End: 2024-01-10
Payer: MEDICARE

## 2024-01-10 DIAGNOSIS — M54.2 CERVICALGIA: ICD-10-CM

## 2024-01-10 DIAGNOSIS — M25.811 IMPINGEMENT OF RIGHT SHOULDER: ICD-10-CM

## 2024-01-10 DIAGNOSIS — M54.2 CERVICALGIA: Primary | ICD-10-CM

## 2024-01-10 PROCEDURE — 73030 X-RAY EXAM OF SHOULDER: CPT

## 2024-01-10 PROCEDURE — 72052 X-RAY EXAM NECK SPINE 6/>VWS: CPT

## 2024-01-17 ENCOUNTER — TRANSCRIBE ORDERS (OUTPATIENT)
Dept: ADMINISTRATIVE | Facility: HOSPITAL | Age: 84
End: 2024-01-17
Payer: MEDICARE

## 2024-01-17 DIAGNOSIS — M12.811 ROTATOR CUFF TEAR ARTHROPATHY OF RIGHT SHOULDER: Primary | ICD-10-CM

## 2024-01-17 DIAGNOSIS — M75.101 ROTATOR CUFF TEAR ARTHROPATHY OF RIGHT SHOULDER: Primary | ICD-10-CM

## 2024-02-08 ENCOUNTER — OFFICE VISIT (OUTPATIENT)
Dept: SLEEP MEDICINE | Facility: CLINIC | Age: 84
End: 2024-02-08
Payer: MEDICARE

## 2024-02-08 VITALS
DIASTOLIC BLOOD PRESSURE: 70 MMHG | HEIGHT: 56 IN | BODY MASS INDEX: 26.77 KG/M2 | TEMPERATURE: 97.8 F | SYSTOLIC BLOOD PRESSURE: 122 MMHG | WEIGHT: 119 LBS | OXYGEN SATURATION: 97 % | HEART RATE: 89 BPM

## 2024-02-08 DIAGNOSIS — G47.30 HYPERSOMNIA WITH SLEEP APNEA: ICD-10-CM

## 2024-02-08 DIAGNOSIS — G47.33 OSA (OBSTRUCTIVE SLEEP APNEA): Primary | ICD-10-CM

## 2024-02-08 DIAGNOSIS — G47.10 HYPERSOMNIA WITH SLEEP APNEA: ICD-10-CM

## 2024-02-08 PROCEDURE — 3074F SYST BP LT 130 MM HG: CPT | Performed by: NURSE PRACTITIONER

## 2024-02-08 PROCEDURE — 99213 OFFICE O/P EST LOW 20 MIN: CPT | Performed by: NURSE PRACTITIONER

## 2024-02-08 PROCEDURE — 3078F DIAST BP <80 MM HG: CPT | Performed by: NURSE PRACTITIONER

## 2024-02-08 RX ORDER — MODAFINIL 100 MG/1
100 TABLET ORAL DAILY
Qty: 30 TABLET | Refills: 2 | Status: SHIPPED | OUTPATIENT
Start: 2024-02-08

## 2024-02-08 NOTE — PROGRESS NOTES
Chief Complaint:   Chief Complaint   Patient presents with    Follow-up    Sleep Apnea       HPI:    Mary Irving is a 83 y.o. female here for follow-up of sleep apnea.  Patient was last seen 12/26/2023.  We did do an order for repair or replace and patient now has a new machine.  Patient feels the mask is too big and is not working well for her.  She does get uncomfortable in the night and will take off the mask.  She would like to go back to a nasal mask that is better fitting and use a chinstrap and I will get that ordered for her today.  Patient was having extreme fatigue throughout the day and I did start her on low-dose Provigil.  Patient states she did not start the medication as she did not want to add a med.  We did review again today her CPAP download with an AHI of 3 being in with normal limits.  We again discussed low-dose stimulant in regard to her hypersomnia.  Patient states she is up-to-date on all cardiac and primary care exams.  She is willing to try Provigil at this time.        Current medications are:   Current Outpatient Medications:     aspirin 81 MG EC tablet, Take 1 tablet by mouth Daily., Disp: , Rfl:     atorvastatin (LIPITOR) 40 MG tablet, 1 tablet Daily., Disp: , Rfl:     buPROPion XL (WELLBUTRIN XL) 150 MG 24 hr tablet, 1 tablet Daily., Disp: , Rfl:     Calcium 250 MG capsule, Daily., Disp: , Rfl:     FLUoxetine (PROzac) 20 MG capsule, 10 mg Daily., Disp: , Rfl:     IRON, FERROUS GLUCONATE, PO, Take  by mouth., Disp: , Rfl:     levothyroxine (SYNTHROID, LEVOTHROID) 88 MCG tablet, 100 mcg Daily., Disp: , Rfl:     losartan-hydrochlorothiazide (HYZAAR) 100-25 MG per tablet, 1 tablet Daily., Disp: , Rfl:     metFORMIN (GLUCOPHAGE) 500 MG tablet, Take 1 tablet by mouth 2 (Two) Times a Day With Meals., Disp: , Rfl:     omeprazole (priLOSEC) 40 MG capsule, Take 1 capsule by mouth 2 (Two) Times a Day., Disp: , Rfl:     ondansetron (ZOFRAN) 4 MG tablet, As Needed., Disp: , Rfl:      VENTOLIN  (90 BASE) MCG/ACT inhaler, As Needed., Disp: , Rfl:     vitamin B-12 (CYANOCOBALAMIN) 1000 MCG tablet, Take 1 tablet by mouth Daily., Disp: , Rfl:     modafinil (PROVIGIL) 100 MG tablet, Take 1 tablet by mouth Daily., Disp: 30 tablet, Rfl: 2.      The patient's relevant past medical, surgical, family and social history were reviewed and updated in Epic as appropriate.       Review of Systems   Constitutional:  Positive for fatigue.   Eyes:  Positive for visual disturbance.   Respiratory:  Positive for apnea, cough and wheezing.    Cardiovascular:  Positive for palpitations.   Gastrointestinal:         Heartburn   Musculoskeletal:  Positive for arthralgias and back pain.   Allergic/Immunologic: Positive for environmental allergies.   Neurological:  Positive for headaches.   Psychiatric/Behavioral:  Positive for sleep disturbance.    All other systems reviewed and are negative.        Objective:    Physical Exam  Constitutional:       Appearance: Normal appearance.   HENT:      Head: Normocephalic and atraumatic.      Mouth/Throat:      Comments: Class 2 airway  Cardiovascular:      Rate and Rhythm: Normal rate.   Pulmonary:      Effort: Pulmonary effort is normal. No respiratory distress.   Neurological:      Mental Status: She is alert and oriented to person, place, and time.   Psychiatric:         Mood and Affect: Mood normal.         Behavior: Behavior normal.         Thought Content: Thought content normal.         Judgment: Judgment normal.         CPAP Report  89/90 days of use  Greater than 4-hour use 89%  Setting 8-16  95th percentile pressure 13.1  AHI 3.0    The patient continues to use and benefit from CPAP therapy.    ASSESSMENT/PLAN    Diagnoses and all orders for this visit:    1. ZHAO (obstructive sleep apnea) (Primary)  -     PAP Therapy    2. Hypersomnia with sleep apnea  -     modafinil (PROVIGIL) 100 MG tablet; Take 1 tablet by mouth Daily.  Dispense: 30 tablet; Refill:  2        Counseled patient regarding multimodal approach with healthy nutrition, healthy sleep, regular physical activity, social activities, counseling, and medications. Encouraged to practice lateral sleep position. Avoid alcohol and sedatives close to bedtime.      Refill supplies x 1 year.  Provigil 100 mg p.o. q. 8 AM I will see patient back in 8 to 10 weeks to reassess.    Signed by  VALENTINA Ramos    February 8, 2024      CC: Rosa Isela Bangura MD         No ref. provider found

## 2024-02-14 ENCOUNTER — HOSPITAL ENCOUNTER (OUTPATIENT)
Dept: MRI IMAGING | Facility: HOSPITAL | Age: 84
Discharge: HOME | End: 2024-02-14
Admitting: FAMILY MEDICINE
Payer: MEDICARE

## 2024-02-14 DIAGNOSIS — M75.101 ROTATOR CUFF TEAR ARTHROPATHY OF RIGHT SHOULDER: ICD-10-CM

## 2024-02-14 DIAGNOSIS — M12.811 ROTATOR CUFF TEAR ARTHROPATHY OF RIGHT SHOULDER: ICD-10-CM

## 2024-02-14 PROCEDURE — 73221 MRI JOINT UPR EXTREM W/O DYE: CPT

## 2024-03-19 NOTE — PROGRESS NOTES
"   Neuro Office Visit      Encounter Date: 2024   Patient Name: Mary Irving  : 1940   MRN: [id number]   PCP:  Rosa Isela Bangura MD     Chief Complaint:    Chief Complaint   Patient presents with    Balance Issues       History of Present Illness: Mary Irving is a 83 y.o. female who is here today in Neurology for balance issues.    Around December of this year began to have episodes where her legs would not support her.    Described it as feeling as though her legs were \"like lymph noodles\".    Noted that when she gets the episodes it is when her body has been stressed.  The most recent episode she had to park far away from [place] and walk.  She entered the vestibule and felt like her \"body just stopped functioning without warning\".  She was assisted to a seat and had to have an escort back to her car to help her.    Episodes have occurred frequently when she was in an exercise class and it can be up to a few hours until she feels normal again.    She has noted jerking tremors of her lower extremities following the episodes.    Since the most recent episode it seems as though she has not bounced back.    Received a letter from her physical therapist.  She has been going for several months and he has had concerns with her weakness, especially the weakness of her lower extremities.  He also has noticed worsening ataxia.    She has noted difficulty with fine motor skills such as fastening buttons, putting her earrings in and out.    Denies difficulty with phone.    Difficulty lifting a mug out of the microwave.    Denies dysphagia.    Her gait is extremely ataxic and she has been using a rolling walker.  When she stood to check the Romberg she was extremely unsteady.    She brought a disc with MRI of the cervical spine.  This was reviewed by Dr. Mendez and myself.    Dr. Mendez came into examine the patient.    Subjective      Past Medical History:   Past Medical History:   Diagnosis " Date    Acid reflux     Anemia     Arthritis     Arthritis     Asthma     Breast injury     HIT LT BREAST WITH HANDLEBAR AS YOUNG CHILD    Depression     Diabetes     Disease of thyroid gland     History of stress test     20 plus years ago    Hyperlipidemia     Hypertension     Thyroid disorder        Past Surgical History:   Past Surgical History:   Procedure Laterality Date    BREAST BIOPSY Right 2010    CATARACT EXTRACTION, BILATERAL      FOOT SURGERY Bilateral     HYSTERECTOMY      AGE 42    OOPHORECTOMY Bilateral     AGE 42       Family History:   Family History   Problem Relation Age of Onset    Thyroid disease Mother     Heart disease Father     Dementia Sister     Ovarian cancer Neg Hx     Breast cancer Neg Hx        Social History:   Social History     Socioeconomic History    Marital status:    Tobacco Use    Smoking status: Former     Current packs/day: 0.00     Average packs/day: 0.5 packs/day for 20.0 years (10.0 ttl pk-yrs)     Types: Cigarettes     Start date:      Quit date:      Years since quittin.2    Smokeless tobacco: Never   Vaping Use    Vaping status: Never Used   Substance and Sexual Activity    Alcohol use: No    Drug use: No    Sexual activity: Defer       Medications:     Current Outpatient Medications:     aspirin 81 MG EC tablet, Take 1 tablet by mouth Daily., Disp: , Rfl:     atorvastatin (LIPITOR) 40 MG tablet, 1 tablet Daily., Disp: , Rfl:     buPROPion XL (WELLBUTRIN XL) 150 MG 24 hr tablet, 1 tablet Daily., Disp: , Rfl:     Calcium 250 MG capsule, Daily., Disp: , Rfl:     FLUoxetine (PROzac) 20 MG capsule, 10 mg Daily., Disp: , Rfl:     IRON, FERROUS GLUCONATE, PO, Take  by mouth., Disp: , Rfl:     levothyroxine (SYNTHROID, LEVOTHROID) 88 MCG tablet, 100 mcg Daily., Disp: , Rfl:     losartan-hydrochlorothiazide (HYZAAR) 100-25 MG per tablet, 1 tablet Daily., Disp: , Rfl:     metFORMIN (GLUCOPHAGE) 500 MG tablet, Take 1 tablet by mouth 2 (Two) Times a Day With  Meals., Disp: , Rfl:     modafinil (PROVIGIL) 100 MG tablet, Take 1 tablet by mouth Daily., Disp: 30 tablet, Rfl: 2    omeprazole (priLOSEC) 40 MG capsule, Take 1 capsule by mouth 2 (Two) Times a Day., Disp: , Rfl:     VENTOLIN  (90 BASE) MCG/ACT inhaler, As Needed., Disp: , Rfl:     vitamin B-12 (CYANOCOBALAMIN) 1000 MCG tablet, Take 1 tablet by mouth Daily., Disp: , Rfl:     Allergies:   Allergies   Allergen Reactions    Bee Venom Unknown - Low Severity    Dust Mite Extract Unknown - Low Severity    Prednisone Unknown - Low Severity    Sulfa Antibiotics Unknown - High Severity and Unknown - Low Severity       PHQ-9 Total Score:     STEADI Fall Risk Assessment was completed, and patient is at HIGH risk for falls. Assessment completed on:3/20/2024    Objective     Physical Exam:   Physical Exam  Vitals reviewed.   Eyes:      Extraocular Movements: EOM normal.      Pupils: Pupils are equal, round, and reactive to light.   Neurological:      Mental Status: She is oriented to person, place, and time.      Deep Tendon Reflexes:      Reflex Scores:       Tricep reflexes are 3+ on the right side and 3+ on the left side.       Bicep reflexes are 3+ on the right side and 3+ on the left side.       Brachioradialis reflexes are 3+ on the right side and 3+ on the left side.       Patellar reflexes are 3+ on the right side and 3+ on the left side.       Achilles reflexes are 3+ on the right side and 3+ on the left side.  Psychiatric:         Speech: Speech normal.         Neurologic Exam     Mental Status   Oriented to person, place, and time.   Speech: speech is normal   Level of consciousness: alert  Knowledge: good.     Cranial Nerves     CN II   Visual fields full to confrontation.     CN III, IV, VI   Pupils are equal, round, and reactive to light.  Extraocular motions are normal.   CN III: no CN III palsy  CN VI: no CN VI palsy    CN V   Facial sensation intact.     CN VII   Facial expression full, symmetric.  "    CN VIII   CN VIII normal.     CN IX, X   CN IX normal.   CN X normal.     CN XI   CN XI normal.     CN XII   CN XII normal.     Motor Exam   Muscle bulk: decreased  Overall muscle tone: decreased  Right arm pronator drift: absent  Left arm pronator drift: absent  Right leg tone: decreased  Left leg tone: decreased    Strength   Right neck flexion: 4/5  Left neck flexion: 4/5  Right neck extension: 4/5  Left neck extension: 4/5  Right deltoid: 3/5  Left deltoid: 3/5  Right biceps: 3/5  Left biceps: 3/5  Right triceps: 3/5  Left triceps: 3/5  Right wrist flexion: 3/5  Left wrist flexion: 3/5  Right wrist extension: 3/5  Left wrist extension: 3/5  Right interossei: 3/5  Left interossei: 3/5  Right quadriceps: 3/5  Left quadriceps: 3/5  Right hamstring: 3/5  Left hamstring: 3/5  Right glutei: 3/5  Left glutei: 3/5  Right anterior tibial: 3/5  Left anterior tibial: 3/5  Right posterior tibial: 3/5  Left posterior tibial: 3/5  Right peroneal: 3/5  Left peroneal: 3/5  Right gastroc: 3/5  Left gastroc: 3/5    Sensory Exam   Light touch normal.     Gait, Coordination, and Reflexes     Gait  Gait: shuffling and spastic    Reflexes   Right brachioradialis: 3+  Left brachioradialis: 3+  Right biceps: 3+  Left biceps: 3+  Right triceps: 3+  Left triceps: 3+  Right patellar: 3+  Left patellar: 3+  Right achilles: 3+  Left achilles: 3+  Right : 3+  Left : 3+  Right ankle clonus: present  Left ankle clonus: present       Vital Signs:   Vitals:    03/20/24 1251   BP: 116/68   Pulse: 93   SpO2: 96%   Weight: 53.4 kg (117 lb 12.8 oz)   Height: 142.2 cm (55.98\")     Body mass index is 26.43 kg/m².     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 Subacromial space impingement with additional acromioclavicular prominent " "degenerative arthrosis with fluid in the subacromial subdeltoid bursa. Electronically Signed: Sandra Dejesus MD  2/15/2024 11:22 AM EST  Workstation ID: [id number]    XR Spine Cervical Complete With Flex Ext    Result Date: 1/11/2024  Impression: There is no evidence of fracture. Moderate straightening is noted without listhesis or subluxation. Alignment is maintained in flexion and extension. Advanced spondylosis change is present, with areas of disc osteophyte complex formation and facet arthropathy, most notable at C5-6. The prevertebral soft tissues are normal. The dens is intact. Electronically Signed: Roberth Walker MD  1/11/2024 4:56 PM EST  Workstation ID: [id number]    XR Shoulder 2+ View Right    Result Date: 1/11/2024  Impression: High-grade narrowing of the subacromial space compatible with massive rotator cuff tearing. Moderate to severe degenerative change of the glenohumeral joint. Electronically Signed: Vinicio Andrews MD  1/11/2024 4:43 PM EST  Workstation ID: [id number]       Labs:    No results found for: \"CMP\", \"PROTEIN\", \"ANTIMOGAB\", \"FRSCIG7EWPK\", \"JCVRESULT\", \"QUANTTBGOLD\", \"CBCDIF\", \"IGGALBSER\"     Assessment / Plan      Assessment/Plan:   Diagnoses and all orders for this visit:    1. Weakness of both lower extremities (Primary)  -     MRI Brain With & Without Contrast; Future  -     EMG & Nerve Conduction Test; Future    2. Ataxia  -     MRI Brain With & Without Contrast; Future  -     EMG & Nerve Conduction Test; Future           Patient Education:     Reviewed medications, potential side effects and signs and symptoms to report. Discussed risk versus benefits of treatment plan with patient and/or family-including medications, labs and radiology that may be ordered. Addressed questions and concerns during visit. Patient and/or family verbalized understanding and agree with plan. Instructed to call the office with any questions and report to ER with any life-threatening symptoms.     Follow Up: "   Return in about 6 weeks (around 5/1/2024).    I spent 45 minutes face to face with the patient. I personally spent 50 percent of this time counseling and discussing diagnosis, diagnostic testing, driving, treatment options, and management .       During this visit the following were done:  Labs Reviewed []    Labs Ordered []    Radiology Reports Reviewed [x]    Radiology Ordered [x]    PCP Records Reviewed []    Referring Provider Records Reviewed [x]    ER Records Reviewed []    Hospital Records Reviewed []    History Obtained From Family []    Radiology Images Reviewed [x]    Other Reviewed [x]    Records Requested []      VALENTINA Sadler  [facility]  [address]  [facility] [address] [phone]

## 2024-03-20 ENCOUNTER — OFFICE VISIT (OUTPATIENT)
Dept: NEUROLOGY | Facility: CLINIC | Age: 84
End: 2024-03-20
Payer: MEDICARE

## 2024-03-20 VITALS
DIASTOLIC BLOOD PRESSURE: 68 MMHG | WEIGHT: 117.8 LBS | OXYGEN SATURATION: 96 % | HEIGHT: 56 IN | SYSTOLIC BLOOD PRESSURE: 116 MMHG | HEART RATE: 93 BPM | BODY MASS INDEX: 26.5 KG/M2

## 2024-03-20 DIAGNOSIS — R27.0 ATAXIA: ICD-10-CM

## 2024-03-20 DIAGNOSIS — R29.898 WEAKNESS OF BOTH LOWER EXTREMITIES: Primary | ICD-10-CM

## 2024-03-20 PROCEDURE — 3078F DIAST BP <80 MM HG: CPT | Performed by: NURSE PRACTITIONER

## 2024-03-20 PROCEDURE — 3074F SYST BP LT 130 MM HG: CPT | Performed by: NURSE PRACTITIONER

## 2024-03-20 PROCEDURE — 1160F RVW MEDS BY RX/DR IN RCRD: CPT | Performed by: NURSE PRACTITIONER

## 2024-03-20 PROCEDURE — 1159F MED LIST DOCD IN RCRD: CPT | Performed by: NURSE PRACTITIONER

## 2024-03-20 PROCEDURE — 99214 OFFICE O/P EST MOD 30 MIN: CPT | Performed by: NURSE PRACTITIONER

## 2024-03-21 ENCOUNTER — TELEPHONE (OUTPATIENT)
Dept: NEUROLOGY | Facility: CLINIC | Age: 84
End: 2024-03-21
Payer: MEDICARE

## 2024-03-21 NOTE — TELEPHONE ENCOUNTER
PATIENT CALLED REGARDING MRI ORDERED 3/20/24 BY PROVIDER.    SHE STATES WHEN SHE GOT HOME, HER ORTHO HAD SCHEDULED HER AN MRI OF BRAIN FOR TOMORROW 3/22.    I TRIED TO EXPLAIN TO HER THAT THE ORDER FOR MRI FROM NEURO HAS NOT BEEN SCHEDULED YET AND SHE WOULD HEAR FROM  CENTRAL SCHEDULING TO DO THAT.    HER CHART SHOWS AND MRI OF SHOULDER FROM ORTHO WHICH SHE SAYS SHE HAS ALREADY COMPLETE.      SHE IS CONFUSED ABOUT THIS AND IS ASKING PROVIDER TO CONTACT HER.

## 2024-03-21 NOTE — TELEPHONE ENCOUNTER
Let the Pt know the Provider looked in the chart and insurance has not approved the request yet.  She will not be able to have the MRI of the brain until it is approved by insurance and then call to schedule.  The orthopedist would not have scheduled an MRI of the brain.    Pt stated she had asked Dr. Crabtree the Ortho Doctor if they could go ahead and refer her for an MRI of the Brain while she waits for her appt to Neurology.    Pt stated they received a phone call yesterday 3/20/24, from Kathy at Dr. Crabtree's office letting her know she has an appt for the MRI on 3/22/24.     Pt will call Dr. Crabtree to confirm if they do have an appt and if they do they will go to it and let us know to cancel their MRI referral.

## 2024-03-21 NOTE — TELEPHONE ENCOUNTER
I looked in the chart and insurance has not approved the request yet.  She will be able to have the MRI of the brain until it is approved by insurance and then call to schedule.  The orthopedist would not have scheduled an MRI of the brain.

## 2024-03-22 NOTE — TELEPHONE ENCOUNTER
PATIENT IS REQUESTING A CALL BACK TO CONFIRM IF MATT WANTS TO SEE HER AFTER HER MRI AND EMG NCV TEST.

## 2024-03-22 NOTE — TELEPHONE ENCOUNTER
She has an appointment on May 1 but once we have the results of the EMG and MRI I will see her sooner.

## 2024-03-22 NOTE — TELEPHONE ENCOUNTER
"Relay     \"Keep appointment on May 1, but once we have the results of the EMG and MRI Provider will see her sooner if needed. \"          "

## 2024-03-27 ENCOUNTER — TELEPHONE (OUTPATIENT)
Dept: NEUROLOGY | Facility: CLINIC | Age: 84
End: 2024-03-27
Payer: MEDICARE

## 2024-03-27 NOTE — TELEPHONE ENCOUNTER
Spoke to patient to inform that it will be a personal choice as to whether or not she has a . I stated that we would be willing to take her to the vehicle after the EMG and be of any help that we can be.

## 2024-03-27 NOTE — TELEPHONE ENCOUNTER
Caller: Mary Irving    Relationship: Self    Best call back number: [phone]     Who are you requesting to speak with (clinical staff, provider,  specific staff member): STAFF/PROVIDER    What was the call regarding: PATIENT WOULD LIKE A CALL RE: QUESTIONS RELATED TO EMG TEST SCHED. FRIDAY 3/29/24.     PLEASE CALL & ADVISE-THANK YOU

## 2024-03-29 ENCOUNTER — PROCEDURE VISIT (OUTPATIENT)
Dept: NEUROLOGY | Facility: CLINIC | Age: 84
End: 2024-03-29
Payer: MEDICARE

## 2024-03-29 ENCOUNTER — LAB (OUTPATIENT)
Dept: LAB | Facility: HOSPITAL | Age: 84
End: 2024-03-29
Payer: MEDICARE

## 2024-03-29 DIAGNOSIS — R29.898 WEAKNESS OF BOTH LOWER EXTREMITIES: ICD-10-CM

## 2024-03-29 DIAGNOSIS — G95.9 MYELOPATHY: ICD-10-CM

## 2024-03-29 DIAGNOSIS — G95.9 MYELOPATHY: Primary | ICD-10-CM

## 2024-03-29 LAB
CHROMATIN AB SERPL-ACNC: 10 IU/ML (ref 0–14)
ERYTHROCYTE [SEDIMENTATION RATE] IN BLOOD: 19 MM/HR (ref 0–30)

## 2024-03-29 PROCEDURE — 36415 COLL VENOUS BLD VENIPUNCTURE: CPT

## 2024-03-29 PROCEDURE — 86334 IMMUNOFIX E-PHORESIS SERUM: CPT

## 2024-03-29 PROCEDURE — 86431 RHEUMATOID FACTOR QUANT: CPT

## 2024-03-29 PROCEDURE — 85652 RBC SED RATE AUTOMATED: CPT

## 2024-03-29 PROCEDURE — 86038 ANTINUCLEAR ANTIBODIES: CPT

## 2024-03-29 PROCEDURE — 82525 ASSAY OF COPPER: CPT

## 2024-03-29 PROCEDURE — 82784 ASSAY IGA/IGD/IGG/IGM EACH: CPT

## 2024-03-29 PROCEDURE — 84446 ASSAY OF VITAMIN E: CPT

## 2024-03-29 NOTE — PROGRESS NOTES
[facility] [place]   Electrodiagnostic Laboratory    Nerve Conduction & EMG Report        Patient: Mary Irving   Patient ID: [id number]   YOB: 1940  Sex: female      Exam Physician:  Shawn Mendez MD  Refer Physician:  XU MONTGOMERY    Electromyogram and Nerve Conduction Velocity Procedure Note    Hx: 83 y.o. right handed female with complaint of weakness involving the both lower extremities. Symptoms have been present for several months and were provoked by walking. Significant past medical history includes nothing suggestive of neuropathy.  Family history no family history of nerve or muscle disease.    Exam: Motor power is 4/5 B UE 3/5 B LE. There is no atrophy. There are no fasciculations. Deep tendon reflexes are hypoactive. Sensory exam is normal.      Edx studies of the B LE were performed to evaluate for peripheral neuropathy.     NCS Examination   For sensory nerve conduction studies, the amplitude is measured peak-to-peak, the latency reported is the distal peak latency, and the conduction velocity, if measured, is determined from onset latencies and is over the forearm.   For motor nerve conduction studies, the amplitude is measured baseline-to-peak, the latency reported is the distal onset latency, the conduction velocity is calculated over the forearm, and the F wave latency is the minimum latency.   Unless otherwise noted, the hand temperature was monitored continuously and remained between 32°C and 36°C during the performance of the NCSs.          Nerve Conduction Studies  Anti Sensory Summary Table     Stim Site NR Norm Peak (ms) O-P Amp (µV) Norm O-P Amp Onset (ms) Site1 Site2 Delta-0 (ms) Dist (cm) Volodymyr (m/s) Norm Volodymyr (m/s)   Left Sup Fibular Anti Sensory (Ant Lat Mall)   14 cm    <4.4 15.4 >5.0 2.3 14 cm Ant Lat Mall 2.3 14.0 61    Right Sup Fibular Anti Sensory (Ant Lat Mall)   14 cm    <4.4 16.9 >5.0 1.3 14 cm Ant Lat Mall 1.3 14.0 108    Left Sural Anti  Sensory (Lat Mall)   Calf    <4.0 15.6 >5.0 2.1 Calf Lat Mall 2.1 14.0 67    Right Sural Anti Sensory (Lat Mall)   Calf    <4.0 20.6 >5.0 1.5 Calf Lat Mall 1.5 14.0 93      Motor Summary Table     Stim Site NR Onset (ms) Norm Onset (ms) O-P Amp (mV) Norm O-P Amp Site1 Site2 Delta-0 (ms) Dist (cm) Volodymyr (m/s) Norm Volodymyr (m/s)   Left Fibular Motor (Ext Dig Brev)   Ankle    4.7 <6.1 4.3 >2.5 B Fib Ankle 5.7 33.0 58 >40   B Fib    10.4  4.5  Poplt B Fib 1.2 8.0 67 >40   Poplt    11.6  4.3          Right Fibular Motor (Ext Dig Brev)   Ankle    3.8 <6.1 3.0 >2.5 B Fib Ankle 6.2 29.0 47 >40   B Fib    10.0  2.8  Poplt B Fib 2.0 9.0 45 >40   Poplt    12.0  2.4          Left Tibial Motor (Abd Doshi Brev)   Ankle    3.3 <6.1 7.4 >3.0 Knee Ankle 8.7 37.0 43 >40   Knee    12.0  2.4          Right Tibial Motor (Abd Doshi Brev)   Ankle    4.0 <6.1 5.4 >3.0 Knee Ankle 6.9 37.0 54 >40   Knee    10.9  5.0            F Wave Studies     NR F-Lat (ms) Lat Norm (ms) L-R F-Lat (ms) L-R Lat Norm   Left Fibular (Mrkrs) (EDB)      46.72 <60 3.28 <5.1   Right Fibular (Mrkrs) (EDB)      50.00 <60 3.28 <5.1   Left Tibial (Mrkrs) (Abd Hallucis)      54.06 <61 0.78 <5.7   Right Tibial (Mrkrs) (Abd Hallucis)      53.28 <61 0.78 <5.7     H Reflex Studies     NR H-Lat (ms) L-R H-Lat (ms) L-R Lat Norm   Left Tibial (Mrkrs) (Gastroc)      25.71 0.00 <2.0   Right Tibial (Mrkrs) (Gastroc)      25.71 0.00 <2.0         EMG Examination   The study was performed with a concentric needle electrode. Fibrillation and fasciculation activity is graded from none (0) to continuous (4+). The configuration and recruitment pattern of motor unit action potentials under voluntary control, if not normal, are described below       Side Muscle Nerve Root Ins Act Fibs Psw Amp Dur Poly Recrt Int Pat Comment   Right AntTibialis Dp Br Fibular L4-5 Nml Nml Nml Nml Nml 0 Nml Nml    Right Gastroc Tibial S1-2 Nml Nml Nml Nml Nml 0 Nml Nml    Right BicepsFemL Sciatic L5-S2 Nml Nml Nml  Nml Nml 0 Nml Nml    Right VastusMed Femoral L2-4 Nml Nml Nml Nml Nml 0 Nml Nml    Right Iliopsoas Femoral L2-3 Nml Nml Nml Nml Nml 0 Nml Nml    Left AntTibialis Dp Br Fibular L4-5 Nml Nml Nml Nml Nml 0 Nml Nml    Left Gastroc Tibial S1-2 Nml Nml Nml Nml Nml 0 Nml Nml    Left BicepsFemL Sciatic L5-S2 Nml Nml Nml Nml Nml 0 Nml Nml    Left VastusMed Femoral L2-4 Nml Nml Nml Nml Nml 0 Nml Nml              NCV FINDINGS:  All nerve conduction studies (as indicated in the following tables) were within normal limits.  All F Wave latencies were within normal limits.  All F Wave left vs. right side latency differences were within normal limits.  All H Reflex left vs. right side latency differences were within normal limits.      EMG FINDINGS:  All examined muscles (as indicated in the following table) showed no evidence of electrical instability.     Conclusion: Normal NCV and EMG of the right lower extremity and left lower extremity          Instrument used:  Teca Synergy        Performed by:          Shawn Mendez MD

## 2024-03-29 NOTE — LETTER
March 29, 2024       No Recipients    Patient: Mary Irving   YOB: 1940   Date of Visit: 3/29/2024     Dear VALENTINA Sadler:       Thank you for referring Mary Irving to me for evaluation. Below are the relevant portions of my assessment and plan of care.    If you have questions, please do not hesitate to call me. I look forward to following Mary along with you.         Sincerely,        Shawn Mendez MD        CC:   No Recipients    Shawn Mendez MD  03/29/24 1048  Sign when Signing Visit      [facility]   Electrodiagnostic Laboratory    Nerve Conduction & EMG Report        Patient: Mary Irving   Patient ID: [id number]   YOB: 1940  Sex: female      Exam Physician:  Shawn Mendez MD  Refer Physician:  XU MONTGOMERY    Electromyogram and Nerve Conduction Velocity Procedure Note    Hx: 83 y.o. right handed female with complaint of weakness involving the both lower extremities. Symptoms have been present for several months and were provoked by walking. Significant past medical history includes nothing suggestive of neuropathy.  Family history no family history of nerve or muscle disease.    Exam: Motor power is 4/5 B UE 3/5 B LE. There is no atrophy. There are no fasciculations. Deep tendon reflexes are hypoactive. Sensory exam is normal.      Edx studies of the B LE were performed to evaluate for peripheral neuropathy.     NCS Examination   For sensory nerve conduction studies, the amplitude is measured peak-to-peak, the latency reported is the distal peak latency, and the conduction velocity, if measured, is determined from onset latencies and is over the forearm.   For motor nerve conduction studies, the amplitude is measured baseline-to-peak, the latency reported is the distal onset latency, the conduction velocity is calculated over the forearm, and the F wave latency is the minimum latency.   Unless otherwise noted, the  hand temperature was monitored continuously and remained between 32°C and 36°C during the performance of the NCSs.          Nerve Conduction Studies  Anti Sensory Summary Table     Stim Site NR Norm Peak (ms) O-P Amp (µV) Norm O-P Amp Onset (ms) Site1 Site2 Delta-0 (ms) Dist (cm) Volodymyr (m/s) Norm Volodymyr (m/s)   Left Sup Fibular Anti Sensory (Ant Lat Mall)   14 cm    <4.4 15.4 >5.0 2.3 14 cm Ant Lat Mall 2.3 14.0 61    Right Sup Fibular Anti Sensory (Ant Lat Mall)   14 cm    <4.4 16.9 >5.0 1.3 14 cm Ant Lat Mall 1.3 14.0 108    Left Sural Anti Sensory (Lat Mall)   Calf    <4.0 15.6 >5.0 2.1 Calf Lat Mall 2.1 14.0 67    Right Sural Anti Sensory (Lat Mall)   Calf    <4.0 20.6 >5.0 1.5 Calf Lat Mall 1.5 14.0 93      Motor Summary Table     Stim Site NR Onset (ms) Norm Onset (ms) O-P Amp (mV) Norm O-P Amp Site1 Site2 Delta-0 (ms) Dist (cm) Volodymyr (m/s) Norm Volodymyr (m/s)   Left Fibular Motor (Ext Dig Brev)   Ankle    4.7 <6.1 4.3 >2.5 B Fib Ankle 5.7 33.0 58 >40   B Fib    10.4  4.5  Poplt B Fib 1.2 8.0 67 >40   Poplt    11.6  4.3          Right Fibular Motor (Ext Dig Brev)   Ankle    3.8 <6.1 3.0 >2.5 B Fib Ankle 6.2 29.0 47 >40   B Fib    10.0  2.8  Poplt B Fib 2.0 9.0 45 >40   Poplt    12.0  2.4          Left Tibial Motor (Abd Doshi Brev)   Ankle    3.3 <6.1 7.4 >3.0 Knee Ankle 8.7 37.0 43 >40   Knee    12.0  2.4          Right Tibial Motor (Abd Doshi Brev)   Ankle    4.0 <6.1 5.4 >3.0 Knee Ankle 6.9 37.0 54 >40   Knee    10.9  5.0            F Wave Studies     NR F-Lat (ms) Lat Norm (ms) L-R F-Lat (ms) L-R Lat Norm   Left Fibular (Mrkrs) (EDB)      46.72 <60 3.28 <5.1   Right Fibular (Mrkrs) (EDB)      50.00 <60 3.28 <5.1   Left Tibial (Mrkrs) (Abd Hallucis)      54.06 <61 0.78 <5.7   Right Tibial (Mrkrs) (Abd Hallucis)      53.28 <61 0.78 <5.7     H Reflex Studies     NR H-Lat (ms) L-R H-Lat (ms) L-R Lat Norm   Left Tibial (Mrkrs) (Gastroc)      25.71 0.00 <2.0   Right Tibial (Mrkrs) (Gastroc)      25.71 0.00 <2.0         EMG  Examination   The study was performed with a concentric needle electrode. Fibrillation and fasciculation activity is graded from none (0) to continuous (4+). The configuration and recruitment pattern of motor unit action potentials under voluntary control, if not normal, are described below       Side Muscle Nerve Root Ins Act Fibs Psw Amp Dur Poly Recrt Int Pat Comment   Right AntTibialis Dp Br Fibular L4-5 Nml Nml Nml Nml Nml 0 Nml Nml    Right Gastroc Tibial S1-2 Nml Nml Nml Nml Nml 0 Nml Nml    Right BicepsFemL Sciatic L5-S2 Nml Nml Nml Nml Nml 0 Nml Nml    Right VastusMed Femoral L2-4 Nml Nml Nml Nml Nml 0 Nml Nml    Right Iliopsoas Femoral L2-3 Nml Nml Nml Nml Nml 0 Nml Nml    Left AntTibialis Dp Br Fibular L4-5 Nml Nml Nml Nml Nml 0 Nml Nml    Left Gastroc Tibial S1-2 Nml Nml Nml Nml Nml 0 Nml Nml    Left BicepsFemL Sciatic L5-S2 Nml Nml Nml Nml Nml 0 Nml Nml    Left VastusMed Femoral L2-4 Nml Nml Nml Nml Nml 0 Nml Nml              NCV FINDINGS:  All nerve conduction studies (as indicated in the following tables) were within normal limits.  All F Wave latencies were within normal limits.  All F Wave left vs. right side latency differences were within normal limits.  All H Reflex left vs. right side latency differences were within normal limits.      EMG FINDINGS:  All examined muscles (as indicated in the following table) showed no evidence of electrical instability.     Conclusion: Normal NCV and EMG of the right lower extremity and left lower extremity          Instrument used:  Teca Synergy        Performed by:          Shawn Mendez MD

## 2024-04-01 LAB
ANA SER QL IF: NEGATIVE
LABORATORY COMMENT REPORT: NORMAL

## 2024-04-02 LAB
COPPER SERPL-MCNC: 115 UG/DL (ref 80–158)
IGA SERPL-MCNC: 282 MG/DL (ref 64–422)
IGG SERPL-MCNC: 978 MG/DL (ref 586–1602)
IGM SERPL-MCNC: 70 MG/DL (ref 26–217)
PROT PATTERN SERPL IFE-IMP: ABNORMAL

## 2024-04-05 LAB
A-TOCOPHEROL VIT E SERPL-MCNC: 13.5 MG/L (ref 9–29)
GAMMA TOCOPHEROL SERPL-MCNC: 1 MG/L (ref 0.5–4.9)

## 2024-04-18 ENCOUNTER — TELEPHONE (OUTPATIENT)
Dept: NEUROLOGY | Facility: CLINIC | Age: 84
End: 2024-04-18
Payer: MEDICARE

## 2024-04-18 ENCOUNTER — OFFICE VISIT (OUTPATIENT)
Dept: SLEEP MEDICINE | Facility: CLINIC | Age: 84
End: 2024-04-18
Payer: MEDICARE

## 2024-04-18 VITALS
TEMPERATURE: 97.4 F | HEIGHT: 56 IN | SYSTOLIC BLOOD PRESSURE: 112 MMHG | DIASTOLIC BLOOD PRESSURE: 64 MMHG | OXYGEN SATURATION: 98 % | BODY MASS INDEX: 26.36 KG/M2 | WEIGHT: 117.2 LBS | HEART RATE: 61 BPM

## 2024-04-18 DIAGNOSIS — G47.10 HYPERSOMNIA WITH SLEEP APNEA: Primary | ICD-10-CM

## 2024-04-18 DIAGNOSIS — G47.30 HYPERSOMNIA WITH SLEEP APNEA: Primary | ICD-10-CM

## 2024-04-18 DIAGNOSIS — G47.33 OSA (OBSTRUCTIVE SLEEP APNEA): ICD-10-CM

## 2024-04-18 PROCEDURE — 3078F DIAST BP <80 MM HG: CPT | Performed by: NURSE PRACTITIONER

## 2024-04-18 PROCEDURE — 99213 OFFICE O/P EST LOW 20 MIN: CPT | Performed by: NURSE PRACTITIONER

## 2024-04-18 PROCEDURE — 3074F SYST BP LT 130 MM HG: CPT | Performed by: NURSE PRACTITIONER

## 2024-04-18 RX ORDER — MODAFINIL 100 MG/1
TABLET ORAL
Qty: 180 TABLET | Refills: 2 | Status: SHIPPED | OUTPATIENT
Start: 2024-04-18

## 2024-04-18 NOTE — PROGRESS NOTES
Chief Complaint:   Chief Complaint   Patient presents with    Sleep Apnea    Snoring       HPI:    Mary Irving is a 83 y.o. female here for follow-up of sleep apnea and hypersomnia with CPAP therapy.  Patient was last seen 2/8/2024 and did agree to try Provigil for hypersomnia.  Patient states more often than not she does feel more energized and completes daily activity with little difficulty.  She states there are days she would like to try an extra tablet we will order her prescription 1-2 as needed today.  She continues to do well with CPAP.  Patient states on her medication she still feels her [place] score is 16/24.  She has no side effects from this medication and feels she is doing well and is happy with the results.        Current medications are:   Current Outpatient Medications:     aspirin 81 MG EC tablet, Take 1 tablet by mouth Daily., Disp: , Rfl:     atorvastatin (LIPITOR) 40 MG tablet, 1 tablet Daily., Disp: , Rfl:     buPROPion XL (WELLBUTRIN XL) 150 MG 24 hr tablet, 1 tablet Daily., Disp: , Rfl:     Calcium 250 MG capsule, Daily., Disp: , Rfl:     FLUoxetine (PROzac) 20 MG capsule, 10 mg Daily., Disp: , Rfl:     IRON, FERROUS GLUCONATE, PO, Take  by mouth., Disp: , Rfl:     levothyroxine (SYNTHROID, LEVOTHROID) 88 MCG tablet, 100 mcg Daily., Disp: , Rfl:     losartan-hydrochlorothiazide (HYZAAR) 100-25 MG per tablet, 1 tablet Daily., Disp: , Rfl:     metFORMIN (GLUCOPHAGE) 500 MG tablet, Take 1 tablet by mouth 2 (Two) Times a Day With Meals., Disp: , Rfl:     omeprazole (priLOSEC) 40 MG capsule, Take 1 capsule by mouth 2 (Two) Times a Day., Disp: , Rfl:     VENTOLIN  (90 BASE) MCG/ACT inhaler, As Needed., Disp: , Rfl:     vitamin B-12 (CYANOCOBALAMIN) 1000 MCG tablet, Take 1 tablet by mouth Daily., Disp: , Rfl:     modafinil (PROVIGIL) 100 MG tablet, 1-2 p.o. every morning, Disp: 180 tablet, Rfl: 2.      The patient's relevant past medical, surgical, family and social history were  reviewed and updated in Epic as appropriate.       Review of Systems   Constitutional:  Positive for fatigue.   Eyes:  Positive for visual disturbance.   Respiratory:  Positive for apnea, cough and wheezing.    Cardiovascular:  Positive for palpitations.   Gastrointestinal:         Heartburn   Musculoskeletal:  Positive for arthralgias and back pain.   Allergic/Immunologic: Positive for environmental allergies.   Neurological:  Positive for headaches.   Psychiatric/Behavioral:  Positive for sleep disturbance.    All other systems reviewed and are negative.        Objective:    Physical Exam  HENT:      Head: Normocephalic and atraumatic.      Mouth/Throat:      Comments: Mallampati 2 anatomy  Cardiovascular:      Rate and Rhythm: Normal rate and regular rhythm.   Pulmonary:      Effort: Pulmonary effort is normal.      Breath sounds: Normal breath sounds.   Skin:     General: Skin is warm and dry.   Neurological:      Mental Status: She is alert and oriented to person, place, and time.   Psychiatric:         Mood and Affect: Mood normal.         Behavior: Behavior normal.         Thought Content: Thought content normal.         Judgment: Judgment normal.         CPAP Report    88/90 days of use  Greater than 4-hour use 92%  Setting 8-18  90% pressure 14.3  AHI 5.0  The patient continues to use and benefit from CPAP therapy.    ASSESSMENT/PLAN    Diagnoses and all orders for this visit:    1. Hypersomnia with sleep apnea (Primary)  -     modafinil (PROVIGIL) 100 MG tablet; 1-2 p.o. every morning  Dispense: 180 tablet; Refill: 2    2. ZHAO (obstructive sleep apnea)        Counseled patient regarding multimodal approach with healthy nutrition, healthy sleep, regular physical activity, social activities, counseling, and medications. Encouraged to practice lateral sleep position. Avoid alcohol and sedatives close to bedtime.    Modafinil 1 to 2 tablets as needed #180 with 2 refills patient to continue CPAP therapy I will  see her back in 4 months.      Signed by  Jenny Bond, APRN    April 18, 2024      CC: Rosa Isela Bangura MD         No ref. provider found

## 2024-04-18 NOTE — TELEPHONE ENCOUNTER
Caller: AristidesMary    Relationship: Self    Best call back number: [phone]     Caller requesting test results:     What test was performed: LABS    When was the test performed: 3-29-24    Where was the test performed: [facility]    Additional notes:   PLEASE CALL PT BACK WITH LAB RESULTS.

## 2024-04-27 ENCOUNTER — HOSPITAL ENCOUNTER (OUTPATIENT)
Facility: HOSPITAL | Age: 84
Discharge: HOME | End: 2024-04-27
Payer: MEDICARE

## 2024-04-27 DIAGNOSIS — G95.9 MYELOPATHY: ICD-10-CM

## 2024-04-27 LAB — CREAT BLDA-MCNC: 0.8 MG/DL (ref 0.6–1.3)

## 2024-04-27 PROCEDURE — 0 GADOBENATE DIMEGLUMINE 529 MG/ML SOLUTION: Performed by: PSYCHIATRY & NEUROLOGY

## 2024-04-27 PROCEDURE — 82565 ASSAY OF CREATININE: CPT

## 2024-04-27 PROCEDURE — 72157 MRI CHEST SPINE W/O & W/DYE: CPT

## 2024-04-27 PROCEDURE — A9577 INJ MULTIHANCE: HCPCS | Performed by: PSYCHIATRY & NEUROLOGY

## 2024-04-27 RX ADMIN — GADOBENATE DIMEGLUMINE 10 ML: 529 INJECTION, SOLUTION INTRAVENOUS at 17:39

## 2024-04-29 ENCOUNTER — TELEPHONE (OUTPATIENT)
Dept: NEUROLOGY | Facility: CLINIC | Age: 84
End: 2024-04-29
Payer: MEDICARE

## 2024-05-01 ENCOUNTER — OFFICE VISIT (OUTPATIENT)
Dept: NEUROLOGY | Facility: CLINIC | Age: 84
End: 2024-05-01
Payer: MEDICARE

## 2024-05-01 VITALS
OXYGEN SATURATION: 96 % | BODY MASS INDEX: 25.78 KG/M2 | WEIGHT: 114.6 LBS | DIASTOLIC BLOOD PRESSURE: 88 MMHG | HEIGHT: 56 IN | HEART RATE: 79 BPM | SYSTOLIC BLOOD PRESSURE: 122 MMHG

## 2024-05-01 DIAGNOSIS — R29.898 WEAKNESS OF BOTH LOWER EXTREMITIES: ICD-10-CM

## 2024-05-01 DIAGNOSIS — R27.0 ATAXIA: Primary | ICD-10-CM

## 2024-05-01 PROCEDURE — 1159F MED LIST DOCD IN RCRD: CPT | Performed by: NURSE PRACTITIONER

## 2024-05-01 PROCEDURE — 99214 OFFICE O/P EST MOD 30 MIN: CPT | Performed by: NURSE PRACTITIONER

## 2024-05-01 PROCEDURE — 1160F RVW MEDS BY RX/DR IN RCRD: CPT | Performed by: NURSE PRACTITIONER

## 2024-05-01 PROCEDURE — 3079F DIAST BP 80-89 MM HG: CPT | Performed by: NURSE PRACTITIONER

## 2024-05-01 PROCEDURE — 3074F SYST BP LT 130 MM HG: CPT | Performed by: NURSE PRACTITIONER

## 2024-05-01 NOTE — PROGRESS NOTES
Neuro Office Visit      Encounter Date: 2024   Patient Name: Mary Irving  : 1940   MRN: [id number]   PCP:  Rosa Isela Bangura MD     Chief Complaint:    Chief Complaint   Patient presents with    Weakness of both lower extremities     F/U       History of Present Illness: Mary Irving is a 83 y.o. female who is here today in Neurology for ataxia, weakness of lower extremities.    Last visit with me on 3/20/2024, MRI of the brain with and without contrast, EMG ordered.    Has decreased activity level as that seemed to exacerbate the ataxia and weakness.    Reviewed MRI of the brain and cervical spine with the patient and neither study revealed a cause for the symptoms.    Since last visit she has not had any further episodes of the ataxia and weakness.    Mary's daughter tells her that she downplays what is going on.    She previously had an episode while she was in an exercise class at the [facility] but she declined to go to the emergency room.    She is using a rolling walker to assist with ambulation.    Mary's daughter does not think that she should be driving.  Mary has only had episodes with weakness and ataxia after she has exerted or had to walk some distance.  At this point I think she is safe to drive if she would like to.      MRI Brain With & Without Contrast (2024)     IMAGING SCANNED (2024)     Progress Notes by Shawn Mendez MD (2024 09:30)     Subjective      Past Medical History:   Past Medical History:   Diagnosis Date    Acid reflux     Anemia     Arthritis     Arthritis     Asthma     Breast injury     HIT LT BREAST WITH HANDLEBAR AS YOUNG CHILD    Depression     Diabetes     Disease of thyroid gland     History of stress test     20 plus years ago    Hyperlipidemia     Hypertension     Thyroid disorder        Past Surgical History:   Past Surgical History:   Procedure Laterality Date    BREAST BIOPSY Right     CATARACT  EXTRACTION, BILATERAL      FOOT SURGERY Bilateral     HYSTERECTOMY      AGE 42    OOPHORECTOMY Bilateral     AGE 42       Family History:   Family History   Problem Relation Age of Onset    Thyroid disease Mother     Heart disease Father     Dementia Sister     Ovarian cancer Neg Hx     Breast cancer Neg Hx        Social History:   Social History     Socioeconomic History    Marital status:    Tobacco Use    Smoking status: Former     Current packs/day: 0.00     Average packs/day: 0.5 packs/day for 20.0 years (10.0 ttl pk-yrs)     Types: Cigarettes     Start date:      Quit date:      Years since quittin.3    Smokeless tobacco: Never   Vaping Use    Vaping status: Never Used   Substance and Sexual Activity    Alcohol use: No    Drug use: No    Sexual activity: Defer       Medications:     Current Outpatient Medications:     aspirin 81 MG EC tablet, Take 1 tablet by mouth Daily., Disp: , Rfl:     atorvastatin (LIPITOR) 40 MG tablet, 1 tablet Daily., Disp: , Rfl:     buPROPion XL (WELLBUTRIN XL) 150 MG 24 hr tablet, 1 tablet Daily., Disp: , Rfl:     Calcium 250 MG capsule, Daily., Disp: , Rfl:     FLUoxetine (PROzac) 20 MG capsule, 10 mg Daily., Disp: , Rfl:     IRON, FERROUS GLUCONATE, PO, Take  by mouth., Disp: , Rfl:     levothyroxine (SYNTHROID, LEVOTHROID) 88 MCG tablet, 100 mcg Daily., Disp: , Rfl:     losartan-hydrochlorothiazide (HYZAAR) 100-25 MG per tablet, 1 tablet Daily., Disp: , Rfl:     metFORMIN (GLUCOPHAGE) 500 MG tablet, Take 1 tablet by mouth 2 (Two) Times a Day With Meals., Disp: , Rfl:     modafinil (PROVIGIL) 100 MG tablet, 1-2 p.o. every morning, Disp: 180 tablet, Rfl: 2    omeprazole (priLOSEC) 40 MG capsule, Take 1 capsule by mouth 2 (Two) Times a Day., Disp: , Rfl:     VENTOLIN  (90 BASE) MCG/ACT inhaler, As Needed., Disp: , Rfl:     vitamin B-12 (CYANOCOBALAMIN) 1000 MCG tablet, Take 1 tablet by mouth Daily., Disp: , Rfl:     Allergies:   Allergies   Allergen  "Reactions    Bee Venom Unknown - Low Severity    Dust Mite Extract Unknown - Low Severity    Prednisone Unknown - Low Severity    Sulfa Antibiotics Unknown - High Severity and Unknown - Low Severity       PHQ-9 Total Score:     GAYLA Fall Risk Assessment was completed, and patient is at HIGH risk for falls. Assessment completed on:2024    Objective     Physical Exam:   Physical Exam  Vitals reviewed.   Neurological:      Mental Status: She is oriented to person, place, and time.      Cranial Nerves: Cranial nerves 2-12 are intact.      Gait: Gait is intact.   Psychiatric:         Speech: Speech normal.         Neurologic Exam     Mental Status   Oriented to person, place, and time.   Speech: speech is normal   Level of consciousness: alert    Cranial Nerves   Cranial nerves II through XII intact.     Motor Exam     Strength   Right neck flexion: 4/5  Left neck flexion: 4/5  Right neck extension: 4/5  Left neck extension: 4/5  Right deltoid: 4/5  Left deltoid: 4/5  Right biceps: 4/5  Left biceps: 4/5  Right triceps: 4/5  Left triceps: 4/5  Right wrist flexion: 4/5  Left wrist flexion: 4/5  Right wrist extension: 4/5  Left wrist extension: 4/5  Right interossei: 4/5  Left interossei: 4/5  Right abdominals: 4/5  Left abdominals: 4/5  Right iliopsoas: 4/5  Left iliopsoas: 4/5  Right quadriceps: 4/5  Left quadriceps: 4/5  Right hamstrin/5  Left hamstrin/5  Right glutei: 4/5  Left glutei: 4/5  Right anterior tibial: 4/5  Left anterior tibial: 4/5  Right posterior tibial: 4/5  Left posterior tibial: 4/5  Right peroneal: 4/5  Left peroneal: 4/5  Right gastroc: 4/5  Left gastroc: 4/5    Sensory Exam   Light touch normal.     Gait, Coordination, and Reflexes     Gait  Gait: normal  Ataxia with exertion        Vital Signs:   Vitals:    24 0909   BP: 122/88   Pulse: 79   SpO2: 96%   Weight: 52 kg (114 lb 9.6 oz)   Height: 142.2 cm (55.98\")     Body mass index is 25.71 kg/m².     Results:   Imaging:   MRI " "Thoracic Spine With & Without Contrast    Result Date: 4/28/2024  Impression: Essentially normal contrast-enhanced MRI of the thoracic spine as above. 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 Subacromial space impingement with additional acromioclavicular prominent degenerative arthrosis with fluid in the subacromial subdeltoid bursa. Electronically Signed: Sandra Dejesus MD  2/15/2024 11:22 AM EST  Workstation ID: [id number]    XR Spine Cervical Complete With Flex Ext    Result Date: 1/11/2024  Impression: There is no evidence of fracture. Moderate straightening is noted without listhesis or subluxation. Alignment is maintained in flexion and extension. Advanced spondylosis change is present, with areas of disc osteophyte complex formation and facet arthropathy, most notable at C5-6. The prevertebral soft tissues are normal. The dens is intact. Electronically Signed: Roberth Walker MD  1/11/2024 4:56 PM EST  Workstation ID: [id number]    XR Shoulder 2+ View Right    Result Date: 1/11/2024  Impression: High-grade narrowing of the subacromial space compatible with massive rotator cuff tearing. Moderate to severe degenerative change of the glenohumeral joint. Electronically Signed: Vinicio Andrews MD  1/11/2024 4:43 PM EST  Workstation ID: [id number]       Labs:    No results found for: \"CMP\", \"PROTEIN\", \"ANTIMOGAB\", \"JZMUBC8NHRR\", \"JCVRESULT\", \"QUANTTBGOLD\", \"CBCDIF\", \"IGGALBSER\"     Assessment / Plan      Assessment/Plan:   Diagnoses and all orders for this visit:    1. Ataxia (Primary)  Comments:  Referral to the movement disorder clinic at   Orders:  -     Ambulatory Referral to Neurology    2. Weakness of both lower extremities  -     Ambulatory " Referral to Neurology           Patient Education:     Reviewed medications, potential side effects and signs and symptoms to report. Discussed risk versus benefits of treatment plan with patient and/or family-including medications, labs and radiology that may be ordered. Addressed questions and concerns during visit. Patient and/or family verbalized understanding and agree with plan. Instructed to call the office with any questions and report to ER with any life-threatening symptoms.     Follow Up:   Return if symptoms worsen or fail to improve.    I spent 45 minutes face to face with the patient. I personally spent 50 percent of this time counseling and discussing diagnosis, diagnostic testing, driving, treatment options, and management .       During this visit the following were done:  Labs Reviewed []    Labs Ordered []    Radiology Reports Reviewed [x]    Radiology Ordered []    PCP Records Reviewed []    Referring Provider Records Reviewed []    ER Records Reviewed []    Hospital Records Reviewed []    History Obtained From Family []    Radiology Images Reviewed [x]    Other Reviewed [x]    Records Requested []      VALENTINA Sadler  [facility]  [address] [place] [id number] [phone]

## 2024-05-13 ENCOUNTER — TELEPHONE (OUTPATIENT)
Dept: NEUROLOGY | Facility: CLINIC | Age: 84
End: 2024-05-13
Payer: MEDICARE

## 2024-05-13 NOTE — TELEPHONE ENCOUNTER
"  Caller: Mary Irving     Best call back number:   Telephone Information:   Mobile [phone]     What is the best time to reach you: ANYTIME    Who are you requesting to speak with (clinical staff, provider,  specific staff member): CLINICAL    What was the call regarding: PT WOULD LIKE TO SEE IF THERE ARE \"ANY TREATMENT\" OPTIONS FOR HER WHILE SHE WAITS TO BE CONTACTED BY [facility] AND/OR SEEN BY [facility]. PLEASE REVIEW AND ADVISE.     "

## 2024-05-14 NOTE — TELEPHONE ENCOUNTER
Spoke with the Pt to let them know per Provider Unfortunately no there are not any treatment options while she waits to be seen by [facility].    Pt verbalized understanding.

## 2024-08-22 ENCOUNTER — OFFICE VISIT (OUTPATIENT)
Dept: SLEEP MEDICINE | Facility: CLINIC | Age: 84
End: 2024-08-22
Payer: MEDICARE

## 2024-08-22 VITALS
TEMPERATURE: 98.2 F | OXYGEN SATURATION: 97 % | HEIGHT: 56 IN | SYSTOLIC BLOOD PRESSURE: 124 MMHG | WEIGHT: 113 LBS | DIASTOLIC BLOOD PRESSURE: 86 MMHG | HEART RATE: 84 BPM | BODY MASS INDEX: 25.42 KG/M2

## 2024-08-22 DIAGNOSIS — G47.10 HYPERSOMNIA WITH SLEEP APNEA: Primary | ICD-10-CM

## 2024-08-22 DIAGNOSIS — G47.30 HYPERSOMNIA WITH SLEEP APNEA: Primary | ICD-10-CM

## 2024-08-22 PROCEDURE — 99213 OFFICE O/P EST LOW 20 MIN: CPT | Performed by: NURSE PRACTITIONER

## 2024-08-22 PROCEDURE — 3079F DIAST BP 80-89 MM HG: CPT | Performed by: NURSE PRACTITIONER

## 2024-08-22 PROCEDURE — 3074F SYST BP LT 130 MM HG: CPT | Performed by: NURSE PRACTITIONER

## 2024-08-22 RX ORDER — ARMODAFINIL 150 MG/1
150 TABLET ORAL DAILY
Qty: 30 TABLET | Refills: 2 | Status: SHIPPED | OUTPATIENT
Start: 2024-08-22

## 2024-08-22 NOTE — PROGRESS NOTES
"Chief Complaint:   Chief Complaint   Patient presents with    Follow-up    Hypersomnia with sleep apnea       HPI:    Mary Irving is a 83 y.o. female here for follow-up of hypersomnia with CPAP therapy.  We did try Provigil patient states this did have very little effect.  But it was \"better than nothing.\"  She does feel it made her anxious and she does not like this symptom.  We did discuss today trying armodafinil and she does wish to move forward with this medication.  We will start with a low dose.  She does sleep 7 to 9 hours nightly and goes to sleep easily.  She will still get up 3-4 times in the night to use the restroom.  She continues nasal mask and standard tubing.  She will continue with therapy and armodafinil trial.        Current medications are:   Current Outpatient Medications:     aspirin 81 MG EC tablet, Take 1 tablet by mouth Daily., Disp: , Rfl:     atorvastatin (LIPITOR) 40 MG tablet, 1 tablet Daily., Disp: , Rfl:     buPROPion XL (WELLBUTRIN XL) 150 MG 24 hr tablet, 1 tablet Daily., Disp: , Rfl:     Calcium 250 MG capsule, Daily., Disp: , Rfl:     FLUoxetine (PROzac) 20 MG capsule, 10 mg Daily., Disp: , Rfl:     IRON, FERROUS GLUCONATE, PO, Take  by mouth., Disp: , Rfl:     levothyroxine (SYNTHROID, LEVOTHROID) 88 MCG tablet, 100 mcg Daily., Disp: , Rfl:     losartan-hydrochlorothiazide (HYZAAR) 100-25 MG per tablet, 1 tablet Daily., Disp: , Rfl:     metFORMIN (GLUCOPHAGE) 500 MG tablet, Take 1 tablet by mouth 2 (Two) Times a Day With Meals., Disp: , Rfl:     omeprazole (priLOSEC) 40 MG capsule, Take 1 capsule by mouth 2 (Two) Times a Day., Disp: , Rfl:     VENTOLIN  (90 BASE) MCG/ACT inhaler, As Needed., Disp: , Rfl:     vitamin B-12 (CYANOCOBALAMIN) 1000 MCG tablet, Take 1 tablet by mouth Daily., Disp: , Rfl:     armodafinil (Nuvigil) 150 MG tablet, Take 1 tablet by mouth Daily., Disp: 30 tablet, Rfl: 2.      The patient's relevant past medical, surgical, family and social " history were reviewed and updated in Epic as appropriate.       Review of Systems   Constitutional:  Positive for fatigue.   Eyes:  Positive for visual disturbance.   Respiratory:  Positive for apnea, cough and wheezing.    Cardiovascular:  Positive for palpitations.   Gastrointestinal:         Heartburn   Musculoskeletal:  Positive for arthralgias, back pain and gait problem.   Allergic/Immunologic: Positive for environmental allergies.   Neurological:  Positive for headaches.   Psychiatric/Behavioral:  Positive for sleep disturbance. The patient is nervous/anxious.          Objective:    Physical Exam  Constitutional:       Appearance: Normal appearance.   HENT:      Head: Normocephalic and atraumatic.      Mouth/Throat:      Comments: Mallampati 2 anatomy  Cardiovascular:      Rate and Rhythm: Normal rate and regular rhythm.   Pulmonary:      Effort: Pulmonary effort is normal.      Breath sounds: Normal breath sounds.   Skin:     General: Skin is warm and dry.   Neurological:      Mental Status: She is alert and oriented to person, place, and time.   Psychiatric:         Mood and Affect: Mood normal.         Thought Content: Thought content normal.         Judgment: Judgment normal.         CPAP Report  90/90 days of use  Greater than 4-hour use 96%  Setting 8-18  95th percentile pressure 14.5  AHI of 3.3    The patient continues to use and benefit from CPAP therapy.    ASSESSMENT/PLAN    Diagnoses and all orders for this visit:    1. Hypersomnia with sleep apnea (Primary)  -     armodafinil (Nuvigil) 150 MG tablet; Take 1 tablet by mouth Daily.  Dispense: 30 tablet; Refill: 2        Counseled patient regarding multimodal approach with healthy nutrition, healthy sleep, regular physical activity, social activities, counseling, and medications. Encouraged to practice lateral sleep position. Avoid alcohol and sedatives close to bedtime.    Discontinue Provigil we will start Nuvigil at 150 mg I will see patient back  in 8 weeks.    Signed by  Jenny Bond, APRN    August 22, 2024      CC: Rosa Isela Bangura MD         No ref. provider found

## 2024-09-24 ENCOUNTER — TRANSCRIBE ORDERS (OUTPATIENT)
Dept: DIABETES SERVICES | Facility: HOSPITAL | Age: 84
End: 2024-09-24
Payer: MEDICARE

## 2024-09-24 DIAGNOSIS — E11.69 HYPERLIPIDEMIA ASSOCIATED WITH TYPE 2 DIABETES MELLITUS: ICD-10-CM

## 2024-09-24 DIAGNOSIS — E11.65 TYPE 2 DIABETES MELLITUS WITH HYPERGLYCEMIA, WITHOUT LONG-TERM CURRENT USE OF INSULIN: Primary | ICD-10-CM

## 2024-09-24 DIAGNOSIS — E78.5 HYPERLIPIDEMIA ASSOCIATED WITH TYPE 2 DIABETES MELLITUS: ICD-10-CM

## 2024-10-31 ENCOUNTER — TELEPHONE (OUTPATIENT)
Dept: SLEEP MEDICINE | Age: 84
End: 2024-10-31
Payer: MEDICARE

## 2024-10-31 DIAGNOSIS — G47.30 HYPERSOMNIA WITH SLEEP APNEA: ICD-10-CM

## 2024-10-31 DIAGNOSIS — G47.10 HYPERSOMNIA WITH SLEEP APNEA: ICD-10-CM

## 2024-10-31 RX ORDER — ARMODAFINIL 150 MG/1
150 TABLET ORAL DAILY
Qty: 30 TABLET | Refills: 2 | Status: SHIPPED | OUTPATIENT
Start: 2024-10-31

## 2024-10-31 NOTE — TELEPHONE ENCOUNTER
Patient called, she want a refill for     modafinil (PROVIGIL) 100 MG tablet [     Prescription  10-17-24    To [facility]

## 2024-11-01 ENCOUNTER — TELEPHONE (OUTPATIENT)
Dept: SLEEP MEDICINE | Facility: CLINIC | Age: 84
End: 2024-11-01
Payer: MEDICARE

## 2024-11-01 NOTE — TELEPHONE ENCOUNTER
Prior Authorization for armodafinil (Nuvigil) 150 MG tablet has been sent through [facility] 11/01/24 AB

## 2024-11-12 ENCOUNTER — OFFICE VISIT (OUTPATIENT)
Dept: SLEEP MEDICINE | Facility: CLINIC | Age: 84
End: 2024-11-12
Payer: MEDICARE

## 2024-11-12 VITALS
HEART RATE: 75 BPM | WEIGHT: 115 LBS | SYSTOLIC BLOOD PRESSURE: 128 MMHG | DIASTOLIC BLOOD PRESSURE: 76 MMHG | TEMPERATURE: 97.8 F | BODY MASS INDEX: 25.87 KG/M2 | OXYGEN SATURATION: 100 % | HEIGHT: 56 IN

## 2024-11-12 DIAGNOSIS — G47.33 OSA (OBSTRUCTIVE SLEEP APNEA): Primary | ICD-10-CM

## 2024-11-12 DIAGNOSIS — G47.10 HYPERSOMNIA WITH SLEEP APNEA: ICD-10-CM

## 2024-11-12 DIAGNOSIS — G47.30 HYPERSOMNIA WITH SLEEP APNEA: ICD-10-CM

## 2024-11-12 PROCEDURE — 3078F DIAST BP <80 MM HG: CPT | Performed by: NURSE PRACTITIONER

## 2024-11-12 PROCEDURE — 99213 OFFICE O/P EST LOW 20 MIN: CPT | Performed by: NURSE PRACTITIONER

## 2024-11-12 PROCEDURE — 3074F SYST BP LT 130 MM HG: CPT | Performed by: NURSE PRACTITIONER

## 2024-11-12 RX ORDER — ARMODAFINIL 150 MG/1
150 TABLET ORAL DAILY
Qty: 30 TABLET | Refills: 2 | Status: SHIPPED | OUTPATIENT
Start: 2024-11-12

## 2024-11-12 NOTE — PROGRESS NOTES
Chief Complaint:   Chief Complaint   Patient presents with    Follow-up    Hypersomnia with sleep apnea       HPI:    Mary Irving is a 84 y.o. female here for follow-up of hypersomnia with CPAP therapy.  Patient was last seen 8/22/2024.  Patient did not think she was doing well with modafinil, patient did have a prescription for armodafinil to see if this was more efficacious.  Patient states she did not have this filled as she had so many modafinil left.  She did cut this down to one half modafinil on her own and states she is still very sleepy but still feels better as her anxiety is lower.  She is still excessively sleepy more so throughout the day.  We will go back to her original plan of trying low-dose armodafinil.  She does get 7 to 9 hours of sleep nightly and is up 3-4 times in the night for the restroom.  She currently has an [place] score of 18/24.    We will discontinue modafinil at this time and try armodafinil.    Current medications are:   Current Outpatient Medications:     armodafinil (Nuvigil) 150 MG tablet, Take 1 tablet by mouth Daily., Disp: 30 tablet, Rfl: 2    aspirin 81 MG EC tablet, Take 1 tablet by mouth Daily., Disp: , Rfl:     atorvastatin (LIPITOR) 40 MG tablet, 1 tablet Daily., Disp: , Rfl:     buPROPion XL (WELLBUTRIN XL) 150 MG 24 hr tablet, 1 tablet Daily., Disp: , Rfl:     Calcium 250 MG capsule, Daily., Disp: , Rfl:     FLUoxetine (PROzac) 20 MG capsule, 10 mg Daily., Disp: , Rfl:     IRON, FERROUS GLUCONATE, PO, Take  by mouth., Disp: , Rfl:     levothyroxine (SYNTHROID, LEVOTHROID) 88 MCG tablet, 100 mcg Daily., Disp: , Rfl:     losartan-hydrochlorothiazide (HYZAAR) 100-25 MG per tablet, 1 tablet Daily., Disp: , Rfl:     metFORMIN (GLUCOPHAGE) 500 MG tablet, Take 1 tablet by mouth 2 (Two) Times a Day With Meals., Disp: , Rfl:     omeprazole (priLOSEC) 40 MG capsule, Take 1 capsule by mouth 2 (Two) Times a Day., Disp: , Rfl:     VENTOLIN  (90 BASE) MCG/ACT  "inhaler, As Needed., Disp: , Rfl:     vitamin B-12 (CYANOCOBALAMIN) 1000 MCG tablet, Take 1 tablet by mouth Daily., Disp: , Rfl: .      The patient's relevant past medical, surgical, family and social history were reviewed and updated in Epic as appropriate.       Review of Systems   Constitutional:  Positive for fatigue.   Eyes:  Positive for visual disturbance.   Respiratory:  Positive for apnea, cough and wheezing.    Cardiovascular:  Positive for palpitations.   Gastrointestinal:         Heartburn   Musculoskeletal:  Positive for arthralgias, back pain and gait problem.   Allergic/Immunologic: Positive for environmental allergies.   Neurological:  Positive for headaches.   Psychiatric/Behavioral:  Positive for sleep disturbance. The patient is nervous/anxious.    All other systems reviewed and are negative.        Objective:    Physical Exam  Constitutional:       Appearance: Normal appearance.   HENT:      Head: Normocephalic and atraumatic.      Mouth/Throat:      Comments: Mallampati 2 anatomy  Cardiovascular:      Rate and Rhythm: Normal rate and regular rhythm.   Skin:     General: Skin is warm and dry.   Neurological:      Mental Status: She is alert and oriented to person, place, and time.   Psychiatric:         Mood and Affect: Mood normal.         Behavior: Behavior normal.         Thought Content: Thought content normal.         Judgment: Judgment normal.     /76   Pulse 75   Temp 97.8 °F (36.6 °C)   Ht 142.2 cm (55.98\")   Wt 52.2 kg (115 lb)   LMP  (LMP Unknown)   SpO2 100%   BMI 25.80 kg/m²         The patient continues to use and benefit from CPAP therapy.    ASSESSMENT/PLAN    Diagnoses and all orders for this visit:    1. ZHAO (obstructive sleep apnea) (Primary)    2. Hypersomnia with sleep apnea  -     armodafinil (Nuvigil) 150 MG tablet; Take 1 tablet by mouth Daily.  Dispense: 30 tablet; Refill: 2    We will try armodafinil 150 mg every morning she will also continue CPAP use and " bring her chip to her next appointment for review I will see her back in 4 to 5 weeks.      Signed by  Jenny Bond, APRN    November 12, 2024      CC: Rosa Isela Bangura MD         No ref. provider found

## 2025-01-16 ENCOUNTER — OFFICE VISIT (OUTPATIENT)
Dept: SLEEP MEDICINE | Facility: CLINIC | Age: 85
End: 2025-01-16
Payer: MEDICARE

## 2025-01-16 VITALS
SYSTOLIC BLOOD PRESSURE: 106 MMHG | TEMPERATURE: 97.5 F | DIASTOLIC BLOOD PRESSURE: 66 MMHG | OXYGEN SATURATION: 97 % | HEART RATE: 68 BPM | HEIGHT: 56 IN | WEIGHT: 111 LBS | BODY MASS INDEX: 24.97 KG/M2

## 2025-01-16 DIAGNOSIS — G47.10 HYPERSOMNIA WITH SLEEP APNEA: ICD-10-CM

## 2025-01-16 DIAGNOSIS — G47.30 HYPERSOMNIA WITH SLEEP APNEA: ICD-10-CM

## 2025-01-16 PROCEDURE — 3078F DIAST BP <80 MM HG: CPT | Performed by: NURSE PRACTITIONER

## 2025-01-16 PROCEDURE — 3074F SYST BP LT 130 MM HG: CPT | Performed by: NURSE PRACTITIONER

## 2025-01-16 PROCEDURE — 99213 OFFICE O/P EST LOW 20 MIN: CPT | Performed by: NURSE PRACTITIONER

## 2025-01-16 RX ORDER — ARMODAFINIL 150 MG/1
150 TABLET ORAL DAILY
Qty: 30 TABLET | Refills: 2 | Status: SHIPPED | OUTPATIENT
Start: 2025-01-16

## 2025-01-16 NOTE — PROGRESS NOTES
Chief Complaint:   Chief Complaint   Patient presents with    Follow-up    Sleep Apnea       HPI:    Mary Irving is a 84 y.o. female here for follow-up of sleep apnea and hypersomnia with CPAP therapy.  Patient was last seen 11/12/2024.  Patient is sleeping 7 to 9 hours nightly if she takes her medication early enough she is able to go to sleep at night.  It does help her get through the day and complete her tasks with the armodafinil.  She has no side effects from this medication and has an [place] score of 17/24 while on it.  She does think it makes a difference and wishes to continue.        Current medications are:   Current Outpatient Medications:     armodafinil (Nuvigil) 150 MG tablet, Take 1 tablet by mouth Daily., Disp: 30 tablet, Rfl: 2    aspirin 81 MG EC tablet, Take 1 tablet by mouth Daily., Disp: , Rfl:     atorvastatin (LIPITOR) 40 MG tablet, 1 tablet Daily., Disp: , Rfl:     buPROPion XL (WELLBUTRIN XL) 150 MG 24 hr tablet, 1 tablet Daily., Disp: , Rfl:     Calcium 250 MG capsule, Daily., Disp: , Rfl:     FLUoxetine (PROzac) 20 MG capsule, 10 mg Daily., Disp: , Rfl:     IRON, FERROUS GLUCONATE, PO, Take  by mouth., Disp: , Rfl:     levothyroxine (SYNTHROID, LEVOTHROID) 88 MCG tablet, 100 mcg Daily., Disp: , Rfl:     losartan-hydrochlorothiazide (HYZAAR) 100-25 MG per tablet, 1 tablet Daily., Disp: , Rfl:     metFORMIN (GLUCOPHAGE) 500 MG tablet, Take 1 tablet by mouth 2 (Two) Times a Day With Meals., Disp: , Rfl:     omeprazole (priLOSEC) 40 MG capsule, Take 1 capsule by mouth 2 (Two) Times a Day., Disp: , Rfl:     VENTOLIN  (90 BASE) MCG/ACT inhaler, As Needed., Disp: , Rfl:     vitamin B-12 (CYANOCOBALAMIN) 1000 MCG tablet, Take 1 tablet by mouth Daily., Disp: , Rfl: .      The patient's relevant past medical, surgical, family and social history were reviewed and updated in Epic as appropriate.       Review of Systems   Constitutional:  Positive for fatigue.   Eyes:  Positive for  "visual disturbance.   Respiratory:  Positive for apnea, cough and wheezing.    Cardiovascular:  Positive for palpitations.   Gastrointestinal:         Heartburn   Musculoskeletal:  Positive for arthralgias, back pain and gait problem.   Allergic/Immunologic: Positive for environmental allergies.   Neurological:  Positive for headaches.   Psychiatric/Behavioral:  Positive for sleep disturbance. The patient is nervous/anxious.    All other systems reviewed and are negative.        Objective:    Physical Exam  Vitals reviewed.   Constitutional:       Appearance: Normal appearance.   HENT:      Head: Normocephalic and atraumatic.      Mouth/Throat:      Comments: Class 2 airway  Cardiovascular:      Rate and Rhythm: Bradycardia present.   Pulmonary:      Effort: Pulmonary effort is normal.   Neurological:      Mental Status: She is alert and oriented to person, place, and time.   Psychiatric:         Mood and Affect: Mood normal.         Behavior: Behavior normal.         Thought Content: Thought content normal.         Judgment: Judgment normal.       /66   Pulse 68   Temp 97.5 °F (36.4 °C)   Ht 142.2 cm (55.98\")   Wt 50.3 kg (111 lb)   LMP  (LMP Unknown)   SpO2 97%   BMI 24.90 kg/m²     CPAP Report    29/30 days of use  Greater than 4-hour use 97%  Setting 8-18  95th percentile pressure 14.8  AHI 2.9  The patient continues to use and benefit from CPAP therapy.    ASSESSMENT/PLAN    Diagnoses and all orders for this visit:    1. Hypersomnia with sleep apnea  -     armodafinil (Nuvigil) 150 MG tablet; Take 1 tablet by mouth Daily.  Dispense: 30 tablet; Refill: 2        Counseled patient regarding multimodal approach with healthy nutrition, healthy sleep, regular physical activity, social activities, counseling, and medications. Encouraged to practice lateral sleep position. Avoid alcohol and sedatives close to bedtime.    Refill of Nuvigil 150 mg every morning #30 with 2 refills Gary is up-to-date and " compliant she will continue with CPAP and I will see her back in 6 months.    Signed by  Jenny Bond, APRN    January 16, 2025      CC: Rosa Isela Bangura MD         No ref. provider found

## 2025-03-07 ENCOUNTER — TELEPHONE (OUTPATIENT)
Age: 85
End: 2025-03-07
Payer: MEDICARE

## 2025-03-07 NOTE — TELEPHONE ENCOUNTER
Patient called, the armodafinil (Nuvigil) 150 MG tablet is giving her anxiety, and she wants to know if Jenny can prescribe something different, please call patient back.

## 2025-03-10 NOTE — TELEPHONE ENCOUNTER
Before we change I would like for her to please take half of the tablet all of the similar medications are going to have similar side effects unfortunately

## 2025-04-05 ENCOUNTER — APPOINTMENT (OUTPATIENT)
Facility: HOSPITAL | Age: 85
End: 2025-04-05
Payer: MEDICARE

## 2025-04-05 ENCOUNTER — HOSPITAL ENCOUNTER (OUTPATIENT)
Facility: HOSPITAL | Age: 85
Setting detail: OBSERVATION
Discharge: HOME HEALTH | End: 2025-04-06
Attending: FAMILY MEDICINE | Admitting: STUDENT IN AN ORGANIZED HEALTH CARE EDUCATION/TRAINING PROGRAM
Payer: MEDICARE

## 2025-04-05 DIAGNOSIS — E78.5 DYSLIPIDEMIA: ICD-10-CM

## 2025-04-05 DIAGNOSIS — E03.9 HYPOTHYROIDISM (ACQUIRED): ICD-10-CM

## 2025-04-05 DIAGNOSIS — F41.8 ANXIETY ASSOCIATED WITH DEPRESSION: ICD-10-CM

## 2025-04-05 DIAGNOSIS — S83.241A ACUTE MEDIAL MENISCUS TEAR OF RIGHT KNEE, INITIAL ENCOUNTER: Primary | ICD-10-CM

## 2025-04-05 DIAGNOSIS — R26.2 INABILITY TO AMBULATE DUE TO RIGHT KNEE: ICD-10-CM

## 2025-04-05 DIAGNOSIS — I10 PRIMARY HYPERTENSION: ICD-10-CM

## 2025-04-05 DIAGNOSIS — S83.511A RUPTURE OF ANTERIOR CRUCIATE LIGAMENT OF RIGHT KNEE, INITIAL ENCOUNTER: ICD-10-CM

## 2025-04-05 DIAGNOSIS — G47.33 SEVERE OBSTRUCTIVE SLEEP APNEA: ICD-10-CM

## 2025-04-05 PROBLEM — M25.561 RIGHT KNEE PAIN: Status: ACTIVE | Noted: 2025-04-05

## 2025-04-05 PROBLEM — S83.209A ACUTE TORN MENISCUS: Status: ACTIVE | Noted: 2025-04-05

## 2025-04-05 PROBLEM — R53.83 FATIGUE: Status: ACTIVE | Noted: 2025-04-05

## 2025-04-05 PROBLEM — M17.11 PRIMARY OSTEOARTHRITIS OF RIGHT KNEE: Status: ACTIVE | Noted: 2025-04-05

## 2025-04-05 PROBLEM — E78.2 MIXED HYPERLIPIDEMIA: Status: ACTIVE | Noted: 2025-04-05

## 2025-04-05 LAB
ALBUMIN SERPL-MCNC: 3.9 G/DL (ref 3.5–5.2)
ALBUMIN/GLOB SERPL: 1.4 G/DL
ALP SERPL-CCNC: 174 U/L (ref 39–117)
ALT SERPL W P-5'-P-CCNC: 11 U/L (ref 1–33)
ANION GAP SERPL CALCULATED.3IONS-SCNC: 11 MMOL/L (ref 5–15)
AST SERPL-CCNC: 17 U/L (ref 1–32)
BASOPHILS # BLD AUTO: 0.04 10*3/MM3 (ref 0–0.2)
BASOPHILS NFR BLD AUTO: 0.4 % (ref 0–1.5)
BILIRUB SERPL-MCNC: 0.7 MG/DL (ref 0–1.2)
BILIRUB UR QL STRIP: NEGATIVE
BUN SERPL-MCNC: 10 MG/DL (ref 8–23)
BUN/CREAT SERPL: 17.5 (ref 7–25)
CALCIUM SPEC-SCNC: 9.2 MG/DL (ref 8.6–10.5)
CHLORIDE SERPL-SCNC: 94 MMOL/L (ref 98–107)
CLARITY UR: CLEAR
CO2 SERPL-SCNC: 25 MMOL/L (ref 22–29)
COLOR UR: YELLOW
CREAT SERPL-MCNC: 0.57 MG/DL (ref 0.57–1)
CRP SERPL-MCNC: 8.66 MG/DL (ref 0–0.5)
DEPRECATED RDW RBC AUTO: 41.6 FL (ref 37–54)
EGFRCR SERPLBLD CKD-EPI 2021: 90.9 ML/MIN/1.73
EOSINOPHIL # BLD AUTO: 0.02 10*3/MM3 (ref 0–0.4)
EOSINOPHIL NFR BLD AUTO: 0.2 % (ref 0.3–6.2)
ERYTHROCYTE [DISTWIDTH] IN BLOOD BY AUTOMATED COUNT: 12 % (ref 12.3–15.4)
ERYTHROCYTE [SEDIMENTATION RATE] IN BLOOD: 44 MM/HR (ref 0–30)
GLOBULIN UR ELPH-MCNC: 2.8 GM/DL
GLUCOSE BLDC GLUCOMTR-MCNC: 128 MG/DL (ref 70–130)
GLUCOSE BLDC GLUCOMTR-MCNC: 241 MG/DL (ref 70–130)
GLUCOSE SERPL-MCNC: 230 MG/DL (ref 65–99)
GLUCOSE UR STRIP-MCNC: ABNORMAL MG/DL
HBA1C MFR BLD: 6.7 % (ref 4.8–5.6)
HCT VFR BLD AUTO: 40.1 % (ref 34–46.6)
HGB BLD-MCNC: 13.4 G/DL (ref 12–15.9)
HGB UR QL STRIP.AUTO: NEGATIVE
IMM GRANULOCYTES # BLD AUTO: 0.03 10*3/MM3 (ref 0–0.05)
IMM GRANULOCYTES NFR BLD AUTO: 0.3 % (ref 0–0.5)
KETONES UR QL STRIP: ABNORMAL
LEUKOCYTE ESTERASE UR QL STRIP.AUTO: NEGATIVE
LYMPHOCYTES # BLD AUTO: 0.98 10*3/MM3 (ref 0.7–3.1)
LYMPHOCYTES NFR BLD AUTO: 9.7 % (ref 19.6–45.3)
MCH RBC QN AUTO: 31.2 PG (ref 26.6–33)
MCHC RBC AUTO-ENTMCNC: 33.4 G/DL (ref 31.5–35.7)
MCV RBC AUTO: 93.5 FL (ref 79–97)
MONOCYTES # BLD AUTO: 1.1 10*3/MM3 (ref 0.1–0.9)
MONOCYTES NFR BLD AUTO: 10.9 % (ref 5–12)
NEUTROPHILS NFR BLD AUTO: 7.96 10*3/MM3 (ref 1.7–7)
NEUTROPHILS NFR BLD AUTO: 78.5 % (ref 42.7–76)
NITRITE UR QL STRIP: NEGATIVE
NRBC BLD AUTO-RTO: 0 /100 WBC (ref 0–0.2)
PH UR STRIP.AUTO: 8.5 [PH] (ref 5–8)
PLATELET # BLD AUTO: 384 10*3/MM3 (ref 140–450)
PMV BLD AUTO: 10.4 FL (ref 6–12)
POTASSIUM SERPL-SCNC: 3.8 MMOL/L (ref 3.5–5.2)
PROT SERPL-MCNC: 6.7 G/DL (ref 6–8.5)
PROT UR QL STRIP: NEGATIVE
RBC # BLD AUTO: 4.29 10*6/MM3 (ref 3.77–5.28)
SODIUM SERPL-SCNC: 130 MMOL/L (ref 136–145)
SP GR UR STRIP: 1.01 (ref 1–1.03)
TSH SERPL DL<=0.05 MIU/L-ACNC: 3.19 UIU/ML (ref 0.27–4.2)
URATE SERPL-MCNC: 2.7 MG/DL (ref 2.4–5.7)
UROBILINOGEN UR QL STRIP: ABNORMAL
WBC NRBC COR # BLD AUTO: 10.13 10*3/MM3 (ref 3.4–10.8)

## 2025-04-05 PROCEDURE — 82948 REAGENT STRIP/BLOOD GLUCOSE: CPT

## 2025-04-05 PROCEDURE — 73560 X-RAY EXAM OF KNEE 1 OR 2: CPT

## 2025-04-05 PROCEDURE — 81003 URINALYSIS AUTO W/O SCOPE: CPT | Performed by: FAMILY MEDICINE

## 2025-04-05 PROCEDURE — G0378 HOSPITAL OBSERVATION PER HR: HCPCS

## 2025-04-05 PROCEDURE — 99223 1ST HOSP IP/OBS HIGH 75: CPT | Performed by: NURSE PRACTITIONER

## 2025-04-05 PROCEDURE — 85025 COMPLETE CBC W/AUTO DIFF WBC: CPT | Performed by: NURSE PRACTITIONER

## 2025-04-05 PROCEDURE — 85652 RBC SED RATE AUTOMATED: CPT | Performed by: NURSE PRACTITIONER

## 2025-04-05 PROCEDURE — 84443 ASSAY THYROID STIM HORMONE: CPT | Performed by: NURSE PRACTITIONER

## 2025-04-05 PROCEDURE — 83036 HEMOGLOBIN GLYCOSYLATED A1C: CPT | Performed by: NURSE PRACTITIONER

## 2025-04-05 PROCEDURE — 80053 COMPREHEN METABOLIC PANEL: CPT | Performed by: NURSE PRACTITIONER

## 2025-04-05 PROCEDURE — 73721 MRI JNT OF LWR EXTRE W/O DYE: CPT

## 2025-04-05 PROCEDURE — 99285 EMERGENCY DEPT VISIT HI MDM: CPT | Performed by: FAMILY MEDICINE

## 2025-04-05 PROCEDURE — 84550 ASSAY OF BLOOD/URIC ACID: CPT | Performed by: NURSE PRACTITIONER

## 2025-04-05 PROCEDURE — 86140 C-REACTIVE PROTEIN: CPT | Performed by: NURSE PRACTITIONER

## 2025-04-05 RX ORDER — BISACODYL 10 MG
10 SUPPOSITORY, RECTAL RECTAL DAILY PRN
Status: DISCONTINUED | OUTPATIENT
Start: 2025-04-05 | End: 2025-04-06 | Stop reason: HOSPADM

## 2025-04-05 RX ORDER — ACETAMINOPHEN 160 MG/5ML
650 SOLUTION ORAL EVERY 4 HOURS PRN
Status: DISCONTINUED | OUTPATIENT
Start: 2025-04-05 | End: 2025-04-06 | Stop reason: HOSPADM

## 2025-04-05 RX ORDER — AMOXICILLIN 250 MG
2 CAPSULE ORAL 2 TIMES DAILY PRN
Status: DISCONTINUED | OUTPATIENT
Start: 2025-04-05 | End: 2025-04-06 | Stop reason: HOSPADM

## 2025-04-05 RX ORDER — SODIUM CHLORIDE 0.9 % (FLUSH) 0.9 %
10 SYRINGE (ML) INJECTION AS NEEDED
Status: DISCONTINUED | OUTPATIENT
Start: 2025-04-05 | End: 2025-04-06 | Stop reason: HOSPADM

## 2025-04-05 RX ORDER — ACETAMINOPHEN 650 MG/1
650 SUPPOSITORY RECTAL EVERY 4 HOURS PRN
Status: DISCONTINUED | OUTPATIENT
Start: 2025-04-05 | End: 2025-04-06 | Stop reason: HOSPADM

## 2025-04-05 RX ORDER — NICOTINE POLACRILEX 4 MG
15 LOZENGE BUCCAL
Status: DISCONTINUED | OUTPATIENT
Start: 2025-04-05 | End: 2025-04-06 | Stop reason: HOSPADM

## 2025-04-05 RX ORDER — LOSARTAN POTASSIUM 50 MG/1
100 TABLET ORAL
Status: DISCONTINUED | OUTPATIENT
Start: 2025-04-05 | End: 2025-04-06 | Stop reason: HOSPADM

## 2025-04-05 RX ORDER — NITROGLYCERIN 0.4 MG/1
0.4 TABLET SUBLINGUAL
Status: DISCONTINUED | OUTPATIENT
Start: 2025-04-05 | End: 2025-04-06 | Stop reason: HOSPADM

## 2025-04-05 RX ORDER — BISACODYL 5 MG/1
5 TABLET, DELAYED RELEASE ORAL DAILY PRN
Status: DISCONTINUED | OUTPATIENT
Start: 2025-04-05 | End: 2025-04-06 | Stop reason: HOSPADM

## 2025-04-05 RX ORDER — ACETAMINOPHEN 325 MG/1
650 TABLET ORAL EVERY 4 HOURS PRN
Status: DISCONTINUED | OUTPATIENT
Start: 2025-04-05 | End: 2025-04-06 | Stop reason: HOSPADM

## 2025-04-05 RX ORDER — HYDROCODONE BITARTRATE AND ACETAMINOPHEN 5; 325 MG/1; MG/1
1 TABLET ORAL EVERY 6 HOURS PRN
Refills: 0 | Status: DISCONTINUED | OUTPATIENT
Start: 2025-04-05 | End: 2025-04-06 | Stop reason: HOSPADM

## 2025-04-05 RX ORDER — FLUOXETINE 10 MG/1
10 CAPSULE ORAL DAILY
Status: DISCONTINUED | OUTPATIENT
Start: 2025-04-05 | End: 2025-04-06 | Stop reason: HOSPADM

## 2025-04-05 RX ORDER — PANTOPRAZOLE SODIUM 40 MG/1
40 TABLET, DELAYED RELEASE ORAL
Status: DISCONTINUED | OUTPATIENT
Start: 2025-04-05 | End: 2025-04-06 | Stop reason: HOSPADM

## 2025-04-05 RX ORDER — ALBUTEROL SULFATE 0.83 MG/ML
2.5 SOLUTION RESPIRATORY (INHALATION) EVERY 6 HOURS PRN
Status: DISCONTINUED | OUTPATIENT
Start: 2025-04-05 | End: 2025-04-06 | Stop reason: HOSPADM

## 2025-04-05 RX ORDER — LEVOTHYROXINE SODIUM 100 UG/1
100 TABLET ORAL DAILY
Status: DISCONTINUED | OUTPATIENT
Start: 2025-04-05 | End: 2025-04-06 | Stop reason: HOSPADM

## 2025-04-05 RX ORDER — HYDROCODONE BITARTRATE AND ACETAMINOPHEN 5; 325 MG/1; MG/1
1 TABLET ORAL ONCE
Refills: 0 | Status: COMPLETED | OUTPATIENT
Start: 2025-04-05 | End: 2025-04-05

## 2025-04-05 RX ORDER — ATORVASTATIN CALCIUM 40 MG/1
40 TABLET, FILM COATED ORAL DAILY
Status: DISCONTINUED | OUTPATIENT
Start: 2025-04-05 | End: 2025-04-06 | Stop reason: HOSPADM

## 2025-04-05 RX ORDER — DEXTROSE MONOHYDRATE 25 G/50ML
25 INJECTION, SOLUTION INTRAVENOUS
Status: DISCONTINUED | OUTPATIENT
Start: 2025-04-05 | End: 2025-04-06 | Stop reason: HOSPADM

## 2025-04-05 RX ORDER — POLYETHYLENE GLYCOL 3350 17 G/17G
17 POWDER, FOR SOLUTION ORAL DAILY PRN
Status: DISCONTINUED | OUTPATIENT
Start: 2025-04-05 | End: 2025-04-06 | Stop reason: HOSPADM

## 2025-04-05 RX ORDER — INSULIN LISPRO 100 [IU]/ML
2-7 INJECTION, SOLUTION INTRAVENOUS; SUBCUTANEOUS
Status: DISCONTINUED | OUTPATIENT
Start: 2025-04-05 | End: 2025-04-06 | Stop reason: HOSPADM

## 2025-04-05 RX ORDER — BUPROPION HYDROCHLORIDE 150 MG/1
150 TABLET ORAL DAILY
Status: DISCONTINUED | OUTPATIENT
Start: 2025-04-06 | End: 2025-04-06 | Stop reason: HOSPADM

## 2025-04-05 RX ORDER — SODIUM CHLORIDE 0.9 % (FLUSH) 0.9 %
10 SYRINGE (ML) INJECTION EVERY 12 HOURS SCHEDULED
Status: DISCONTINUED | OUTPATIENT
Start: 2025-04-05 | End: 2025-04-06 | Stop reason: HOSPADM

## 2025-04-05 RX ORDER — HYDROCHLOROTHIAZIDE 25 MG/1
25 TABLET ORAL
Status: DISCONTINUED | OUTPATIENT
Start: 2025-04-05 | End: 2025-04-06 | Stop reason: HOSPADM

## 2025-04-05 RX ORDER — SODIUM CHLORIDE 9 MG/ML
40 INJECTION, SOLUTION INTRAVENOUS AS NEEDED
Status: DISCONTINUED | OUTPATIENT
Start: 2025-04-05 | End: 2025-04-06 | Stop reason: HOSPADM

## 2025-04-05 RX ORDER — MODAFINIL 100 MG/1
200 TABLET ORAL DAILY
Status: DISCONTINUED | OUTPATIENT
Start: 2025-04-05 | End: 2025-04-06 | Stop reason: HOSPADM

## 2025-04-05 RX ORDER — IBUPROFEN 600 MG/1
1 TABLET ORAL
Status: DISCONTINUED | OUTPATIENT
Start: 2025-04-05 | End: 2025-04-06 | Stop reason: HOSPADM

## 2025-04-05 RX ADMIN — Medication 10 ML: at 21:48

## 2025-04-05 RX ADMIN — HYDROCODONE BITARTRATE AND ACETAMINOPHEN 1 TABLET: 5; 325 TABLET ORAL at 10:56

## 2025-04-05 RX ADMIN — LEVOTHYROXINE SODIUM 100 MCG: 0.1 TABLET ORAL at 19:39

## 2025-04-05 RX ADMIN — HYDROCODONE BITARTRATE AND ACETAMINOPHEN 1 TABLET: 5; 325 TABLET ORAL at 19:33

## 2025-04-05 RX ADMIN — LOSARTAN POTASSIUM 100 MG: 50 TABLET, FILM COATED ORAL at 19:39

## 2025-04-05 RX ADMIN — FLUOXETINE HYDROCHLORIDE 10 MG: 10 CAPSULE ORAL at 19:40

## 2025-04-05 RX ADMIN — PANTOPRAZOLE SODIUM 40 MG: 40 TABLET, DELAYED RELEASE ORAL at 19:39

## 2025-04-05 RX ADMIN — ATORVASTATIN CALCIUM 40 MG: 40 TABLET, FILM COATED ORAL at 19:39

## 2025-04-05 RX ADMIN — HYDROCHLOROTHIAZIDE 25 MG: 25 TABLET ORAL at 19:39

## 2025-04-05 NOTE — FSED PROVIDER NOTE
"Subjective   History of Present Illness    Patient is a 84-year-old female presents to ED via EMS with complaints of right knee pain.  Patient reports that yesterday she was getting out of her car when she \"tweaked\" her knee.  Patient reports that this was initially painful but that she try to sleep it off last night.  Patient reports that pain has increased and now joint is swollen.  Patient states that ambulating is incredibly difficult.  Patient denying any overlying skin changes, distal numbness, paresthesias, loss of range of motion.  Patient's range of motion of knee is very limited secondary to to discomfort.  Patient states that she has had a long history of arthritis in this knee but denies any other previous injuries or surgeries.  Patient denies fevers, chills, nausea, vomiting, redness, warmth.     Review of Systems   Musculoskeletal:  Positive for gait problem and joint swelling.   All other systems reviewed and are negative.      Past Medical History:   Diagnosis Date    Acid reflux     Anemia     Arthritis     Arthritis     Asthma     Breast injury     HIT LT BREAST WITH HANDLEBAR AS YOUNG CHILD    Depression     Diabetes     Disease of thyroid gland     History of stress test     20 plus years ago    Hyperlipidemia     Hypertension     Thyroid disorder        Allergies   Allergen Reactions    Bee Venom Unknown - Low Severity    Dust Mite Extract Unknown - Low Severity    Prednisone Unknown - Low Severity    Sulfa Antibiotics Unknown - High Severity and Unknown - Low Severity       Past Surgical History:   Procedure Laterality Date    BREAST BIOPSY Right 2010    CATARACT EXTRACTION, BILATERAL      FOOT SURGERY Bilateral     HYSTERECTOMY      AGE 42    OOPHORECTOMY Bilateral     AGE 42       Family History   Problem Relation Age of Onset    Thyroid disease Mother     Heart disease Father     Dementia Sister     Ovarian cancer Neg Hx     Breast cancer Neg Hx        Social History     Socioeconomic " History    Marital status:    Tobacco Use    Smoking status: Former     Current packs/day: 0.00     Average packs/day: 0.5 packs/day for 20.0 years (10.0 ttl pk-yrs)     Types: Cigarettes     Start date:      Quit date:      Years since quittin.2    Smokeless tobacco: Never   Vaping Use    Vaping status: Never Used   Substance and Sexual Activity    Alcohol use: No    Drug use: No    Sexual activity: Defer           Objective   Physical Exam  Constitutional:       General: She is not in acute distress.     Appearance: Normal appearance.   HENT:      Head: Normocephalic and atraumatic.      Mouth/Throat:      Mouth: Mucous membranes are moist.      Pharynx: Oropharynx is clear.   Cardiovascular:      Rate and Rhythm: Normal rate and regular rhythm.   Pulmonary:      Effort: Pulmonary effort is normal.      Breath sounds: Normal breath sounds.   Abdominal:      General: Abdomen is flat.      Palpations: Abdomen is soft.   Musculoskeletal:         General: Swelling and tenderness present.   Skin:     General: Skin is warm and dry.      Capillary Refill: Capillary refill takes less than 2 seconds.   Neurological:      General: No focal deficit present.      Mental Status: She is alert and oriented to person, place, and time.   Psychiatric:         Mood and Affect: Mood normal.         Behavior: Behavior normal.         Procedures           ED Course                                           Medical Decision Making  Amount and/or Complexity of Data Reviewed  Radiology: ordered.    Risk  Prescription drug management.      Patient presenting with significant right knee pain.  Patient unable to tolerate much range of motion and is no longer able to weight-bear.  Patient's phone was unable to get patient to hospital secondary to discomfort, EMS had to be called.  Patient's vital signs stable, afebrile, normotensive.  Patient denies any trauma but does recall that yesterday she was try get out of her car  "when she felt it \"tweak\" which resulted in worsening pain overnight.  No overlying skin changes noted.  Significant joint effusion noted on physical exam.  Distal pulses +2.  Neurovascular intact.  X-ray did not demonstrate acute findings however MRI of knee did demonstrate a significant arthritis, complex tear/maceration of the medial meniscus, thickening intermittent signal throughout the anterior cruciate ligament although there does appear to be at least some intact ACL fibers.  Large knee effusion with a known Baker's cyst measuring 7 x 1.6 cm also noted.  Patient does live by herself.  Patient to be admitted to [facility] for further treatment and management with physical therapy evaluation and possible orthopedic consult.       Final diagnoses:   Acute medial meniscus tear of right knee, initial encounter   Rupture of anterior cruciate ligament of right knee, initial encounter   Inability to ambulate due to right knee       ED Disposition  ED Disposition       ED Disposition   Decision to Admit    Condition   --    Comment   Level of Care: Med/Surg [1]   Accepting Provider:: GURMEET FERNANDEZ [[id number]]   Patient Class: Inpatient [101]                 No follow-up provider specified.       Medication List      No changes were made to your prescriptions during this visit.         "

## 2025-04-05 NOTE — ED NOTES
Mary Irving    Nursing Report ED to Floor:  Mental status: AXO X3  Ambulatory status: NO WEIGH BEARING IN  RIGHT LEG  Oxygen Therapy:  RA  Cardiac Rhythm: NSR  Admitted from: ED [facility]  Safety Concerns:  FALL DUE TO NWB ON RIGHT LEG  Social Issues: NONE  ED Room #:  19    ED Nurse Phone Extension - [phone] or may call [phone].      HPI:   Chief Complaint   Patient presents with    Leg Pain       Past Medical History:  Past Medical History:   Diagnosis Date    Acid reflux     Anemia     Arthritis     Arthritis     Asthma     Breast injury     HIT LT BREAST WITH HANDLEBAR AS YOUNG CHILD    Depression     Diabetes     Disease of thyroid gland     History of stress test     20 plus years ago    Hyperlipidemia     Hypertension     Thyroid disorder         Past Surgical History:  Past Surgical History:   Procedure Laterality Date    BREAST BIOPSY Right 2010    CATARACT EXTRACTION, BILATERAL      FOOT SURGERY Bilateral     HYSTERECTOMY      AGE 42    OOPHORECTOMY Bilateral     AGE 42        Admitting Doctor:   No admitting provider for patient encounter.    Consulting Provider(s):  Consults       No orders found from 3/7/2025 to 4/6/2025.             Admitting Diagnosis:   The primary encounter diagnosis was Acute medial meniscus tear of right knee, initial encounter. Diagnoses of Rupture of anterior cruciate ligament of right knee, initial encounter and Inability to ambulate due to right knee were also pertinent to this visit.    Most Recent Vitals:   Vitals:    04/05/25 1115 04/05/25 1130 04/05/25 1146 04/05/25 1200   BP: 158/86 153/99 127/78 123/78   BP Location:       Patient Position:       Pulse: 73 70 74 72   Resp:       Temp:       TempSrc:       SpO2: 100% 99% 98% 100%   Weight:       Height:           Active LDAs/IV Access:   Lines, Drains & Airways       Active LDAs       Name Placement date Placement time Site Days    Peripheral IV 04/05/25 1025 Left Antecubital 04/05/25  1025  Antecubital  less than 1                     Labs (abnormal labs have a star):   Labs Reviewed   URINALYSIS W/ CULTURE IF INDICATED - Abnormal; Notable for the following components:       Result Value    pH, UA 8.5 (*)     Glucose, UA >=1000 mg/dL (3+) (*)     Ketones, UA Trace (*)     All other components within normal limits    Narrative:     In absence of clinical symptoms, the presence of pyuria, bacteria, and/or nitrites on the urinalysis result does not correlate with infection.  Urine microscopic not indicated.       Meds Given in ED:   Medications   HYDROcodone-acetaminophen (NORCO) 5-325 MG per tablet 1 tablet (1 tablet Oral Given 4/5/25 1056)     No current facility-administered medications for this encounter.       Last NIH score:                                                          Dysphagia screening results:        [place] Coma Scale:  No data recorded     CIWA:        Restraint Type:            Isolation Status:  No active isolations

## 2025-04-05 NOTE — H&P
[facility] Medicine Services  HISTORY AND PHYSICAL    Patient Name: Mary Irving  : 1940  MRN: [id number]  Primary Care Physician: Rosa Isela Bangura MD  Date of admission: 2025    Subjective   Subjective     Chief Complaint:  Right knee pain    HPI:  Mary Irving is a 84 y.o. female PMH anxiety, depression, fatigue, hypothyroidism, asthma, HTN, HLD, osteoarthritis presenting with intractable right knee pain. She reports she was getting out of the car yesterday when her knee twisted and buckled, causing severe pain. She was in the parking lot at her physical therapist at the time of the event. Her physical therapist  assessed the knee and performed exercises that helped with the pain.  When she arrived home; however, the knee became more and more painful. The pain was so unbearable she was unable to sleep or eat. She eventually felt nauseated. Pt reports issues with the right knee twisting and/or buckling without warning. She reports using a cane or walker at times.       Review of Systems   Constitutional:  Positive for activity change and appetite change.   HENT: Negative.     Eyes: Negative.    Respiratory: Negative.     Cardiovascular: Negative.    Gastrointestinal:  Positive for nausea.   Endocrine: Negative.    Genitourinary: Negative.    Musculoskeletal:         Right knee pain and swelling   Skin: Negative.    Allergic/Immunologic: Negative.    Neurological: Negative.    Hematological: Negative.    Psychiatric/Behavioral: Negative.        Personal History     Past Medical History:   Diagnosis Date    Acid reflux     Anemia     Arthritis     Arthritis     Asthma     Breast injury     HIT LT BREAST WITH HANDLEBAR AS YOUNG CHILD    Depression     Diabetes     Disease of thyroid gland     History of stress test     20 plus years ago    Hyperlipidemia     Hypertension     Thyroid disorder      Past Surgical History:   Procedure Laterality Date    BREAST BIOPSY  Right 2010    CATARACT EXTRACTION, BILATERAL      FOOT SURGERY Bilateral     HYSTERECTOMY      AGE 42    OOPHORECTOMY Bilateral     AGE 42     Family History:  family history includes Dementia in her sister; Heart disease in her father; Thyroid disease in her mother.     Social History:  reports that she quit smoking about 29 years ago. Her smoking use included cigarettes. She started smoking about 49 years ago. She has a 10 pack-year smoking history. She has never used smokeless tobacco. She reports that she does not drink alcohol and does not use drugs.  Social History     Social History Narrative    Not on file       Medications:  Calcium, FLUoxetine, Ferrous Gluconate, albuterol sulfate HFA, armodafinil, aspirin, atorvastatin, buPROPion XL, levothyroxine, losartan-hydrochlorothiazide, metFORMIN, omeprazole, and vitamin B-12    Allergies   Allergen Reactions    Bee Venom Unknown - Low Severity    Dust Mite Extract Unknown - Low Severity    Prednisone Unknown - Low Severity    Sulfa Antibiotics Unknown - High Severity and Unknown - Low Severity       Objective   Objective     Vital Signs:   Temp:  [98.1 °F (36.7 °C)-99.2 °F (37.3 °C)] 98.1 °F (36.7 °C)  Heart Rate:  [70-86] 76  Resp:  [18-20] 18  BP: (123-158)/(77-99) 148/86    Physical Exam  Constitutional:       General: She is not in acute distress.  HENT:      Head: Normocephalic and atraumatic.      Mouth/Throat:      Mouth: Mucous membranes are moist.   Eyes:      Conjunctiva/sclera: Conjunctivae normal.   Cardiovascular:      Rate and Rhythm: Normal rate and regular rhythm.      Heart sounds: No murmur heard.     No friction rub. No gallop.   Pulmonary:      Effort: Pulmonary effort is normal.      Breath sounds: Normal breath sounds.   Abdominal:      General: Bowel sounds are normal. There is no distension.      Tenderness: There is no abdominal tenderness.   Musculoskeletal:      Comments: Right knee pain with flexion, extension. Edema present.     Skin:     General: Skin is warm and dry.   Neurological:      General: No focal deficit present.      Mental Status: She is alert and oriented to person, place, and time.   Psychiatric:         Mood and Affect: Mood normal.         Behavior: Behavior normal.          Result Review:  I have personally reviewed the results from the time of this admission to 4/5/2025 17:56 EDT and agree with these findings:  [x]  Laboratory list / accordion  []  Microbiology  [x]  Radiology  []  EKG/Telemetry   []  Cardiology/Vascular   []  Pathology  [x]  Old records  []  Other:      LAB RESULTS:        Brief Urine Lab Results  (Last result in the past 365 days)        Color   Clarity   Blood   Leuk Est   Nitrite   Protein   CREAT   Urine HCG        04/05/25 1109 Yellow   Clear   Negative   Negative   Negative   Negative                 Microbiology Results (last 10 days)       ** No results found for the last 240 hours. **            MRI Knee Right Without Contrast  Result Date: 4/5/2025  MRI KNEE RIGHT  WO CONTRAST Date of Exam: 4/5/2025 12:12 PM EDT Indication: inability to ambulate, pain, swelling.  Comparison: Right knee radiographs dated 4/5/2025 Technique:  Routine multiplanar/multisequence images of the right knee were obtained without contrast administration. FINDINGS: BONE AND JOINT: Tricompartmental osteoarthritic changes of the knee are present. There is high-grade chondromalacia of the medial joint compartment with milder chondromalacia of the lateral and patellofemoral compartments. Tricompartment marginal osteophyte formation is noted. Bone marrow signal is unremarkable. Large knee effusion is present. TENDONS AND LIGAMENTS: There is thickening and intermediate signal throughout the anterior cruciate ligament. There appear to be at least some intact ACL fibers. Findings may represent mucoid degeneration or partial tearing. Please correlate with findings on physical examination. PCL, quadriceps tendon, patellar tendon,  patellar retinacula, medial collateral ligament, lateral collateral ligamentous complex, and popliteus tendon appear intact. MENISCI: Complex tear/maceration of the medial meniscus. Lateral meniscus appears intact. SOFT TISSUES: Large septated Baker's cyst measuring approximately 7 x 1.6 cm. Additional lobulated fluid signal posteriorly about the knee may represent joint fluid and/or ganglion cyst formation.Scattered soft tissue edema about the knee. No muscle edema or muscle atrophy is seen.     Impression: 1.Tricompartmental osteoarthritic arthrosis of the knee with high-grade chondromalacia of the medial joint compartment and milder chondromalacia of the lateral and patellofemoral compartments. Tricompartment marginal osteophyte formation is present. 2.Complex tear/maceration of the medial meniscus. 3.Thickening and intermediate signal throughout the anterior cruciate ligament. There appear to be at least some intact ACL fibers. Findings may represent mucoid degeneration or partial tearing. Please correlate with findings on physical examination. 4.Large knee effusion with a septated Baker's cyst measuring 7 x 1.6 cm. Additional lobulated fluid signal posteriorly about the knee may represent joint fluid and/or ganglion cyst formation. 5.Additional findings as above. Electronically Signed: Eder Chatman MD  4/5/2025 12:49 PM EDT  Workstation ID: [id number]    XR Knee 1 or 2 View Right  Result Date: 4/5/2025  XR KNEE 1 OR 2 VW RIGHT Date of Exam: 4/5/2025 10:38 AM EDT Indication: Right knee pain with swelling. Comparison: None available. Findings: There is narrowing, subchondral sclerosis, cortical irregularity, and spurring of the medial compartment and patellofemoral compartment consistent with moderate-severe osteoarthritis. There is mild spurring of the lateral compartment also indicating arthritis. The bony structures are demineralized. There is no acute fracture or dislocation. The patient has a moderate sized  suprapatellar joint effusion.     Impression: Impression: 1.No acute fracture or dislocation. 2.Degenerative changes. 3.Moderate suprapatellar joint effusion. Electronically Signed: Jose Rivera MD  4/5/2025 11:08 AM EDT  Workstation ID: [id number]      Results for orders placed during the hospital encounter of 11/10/23    Adult Transthoracic Echo Complete W/ Cont if Necessary Per Protocol    Interpretation Summary    Left ventricular ejection fraction appears to be greater than 70%.  grade 1 diastolic dysfunction    mild aortic valve insuficiency    Estimated right ventricular systolic pressure from tricuspid regurgitation is normal (<35 mmHg).      Assessment & Plan   Assessment & Plan       Right knee pain    Primary hypertension    Anxiety associated with depression    Hypothyroidism (acquired)    Mild intermittent asthma    Mixed hyperlipidemia    Primary osteoarthritis of right knee    Fatigue    Hospital Course to date:  Mary Irving is a 84 y.o. female PMH anxiety, depression, fatigue, hypothyroidism, asthma, HTN, HLD, osteoarthritis presenting with intractable right knee pain. She reports she was getting out of the car yesterday when her knee twisted and buckled, causing severe pain.     Intractable right knee pain  Hx of Baker's cyst and was told it was too deep to drain.   Hx of steroid joint injections to right knee  - MRI right knee: Tricompartmental osteoarthritic arthrosis of knee, high grade chondromalaxia of the medial joint compartment and milder chondromalaxia of the lateral and patellofemoral compartments. Tricompartmental marginal osteophyte formation present. Complex tear/maceration of the medial meniscus. Possible partial tear of the ACL. Large knee effusion with Baker's cyst measuring 7x1.6 cm.   - pain management  - consult ortho  - PT/OT eval    HTN/HLD  - losartan HCTZ  - atorvastatin    Hypothyroidism  - levothyroxine    DMII  - SSI  - A1C    GERD  - protonix    Depression,  anxiety  - wellbutrin  - prozac    Fatigue  - holding nuvigil because patient did not sleep last night.     Total time spent: 85 min  Time spent includes time reviewing chart, face-to-face time, counseling patient/family/caregiver, ordering medications/tests/procedures, communicating with other health care professionals, documenting clinical information in the electronic health record, and coordination of care.       DVT prophylaxis:  SCDs    CODE STATUS:    Code Status (Patient has no pulse and is not breathing): No CPR (Do Not Attempt to Resuscitate)  Medical Interventions (Patient has pulse or is breathing): Limited Support  Medical Intervention Limits: No intubation (DNI)  Level Of Support Discussed With: Patient      Expected Discharge 04/08/2025         Signature: Electronically signed by VALENTINA Esqueda, 04/05/25, 5:56 PM EDT.

## 2025-04-06 VITALS
RESPIRATION RATE: 18 BRPM | HEART RATE: 63 BPM | WEIGHT: 119.3 LBS | TEMPERATURE: 97 F | DIASTOLIC BLOOD PRESSURE: 67 MMHG | OXYGEN SATURATION: 100 % | BODY MASS INDEX: 25.74 KG/M2 | HEIGHT: 57 IN | SYSTOLIC BLOOD PRESSURE: 109 MMHG

## 2025-04-06 PROBLEM — E87.1 HYPONATREMIA: Status: ACTIVE | Noted: 2025-04-06

## 2025-04-06 LAB
ALBUMIN SERPL-MCNC: 3.7 G/DL (ref 3.5–5.2)
ALBUMIN/GLOB SERPL: 1.3 G/DL
ALP SERPL-CCNC: 165 U/L (ref 39–117)
ALT SERPL W P-5'-P-CCNC: 14 U/L (ref 1–33)
ANION GAP SERPL CALCULATED.3IONS-SCNC: 12 MMOL/L (ref 5–15)
APPEARANCE FLD: ABNORMAL
AST SERPL-CCNC: 16 U/L (ref 1–32)
BASOPHILS # BLD AUTO: 0.02 10*3/MM3 (ref 0–0.2)
BASOPHILS NFR BLD AUTO: 0.3 % (ref 0–1.5)
BILIRUB SERPL-MCNC: 0.6 MG/DL (ref 0–1.2)
BUN SERPL-MCNC: 11 MG/DL (ref 8–23)
BUN/CREAT SERPL: 15.5 (ref 7–25)
CALCIUM SPEC-SCNC: 8.8 MG/DL (ref 8.6–10.5)
CHLORIDE SERPL-SCNC: 96 MMOL/L (ref 98–107)
CO2 SERPL-SCNC: 22 MMOL/L (ref 22–29)
COLOR FLD: ABNORMAL
CREAT SERPL-MCNC: 0.71 MG/DL (ref 0.57–1)
CRYSTALS FLD MICRO: NORMAL
DEPRECATED RDW RBC AUTO: 43.5 FL (ref 37–54)
EGFRCR SERPLBLD CKD-EPI 2021: 85 ML/MIN/1.73
EOSINOPHIL # BLD AUTO: 0.08 10*3/MM3 (ref 0–0.4)
EOSINOPHIL NFR BLD AUTO: 1.1 % (ref 0.3–6.2)
ERYTHROCYTE [DISTWIDTH] IN BLOOD BY AUTOMATED COUNT: 12.3 % (ref 12.3–15.4)
GLOBULIN UR ELPH-MCNC: 2.9 GM/DL
GLUCOSE BLDC GLUCOMTR-MCNC: 172 MG/DL (ref 70–130)
GLUCOSE BLDC GLUCOMTR-MCNC: 196 MG/DL (ref 70–130)
GLUCOSE SERPL-MCNC: 303 MG/DL (ref 65–99)
HCT VFR BLD AUTO: 40.2 % (ref 34–46.6)
HGB BLD-MCNC: 13 G/DL (ref 12–15.9)
IMM GRANULOCYTES # BLD AUTO: 0.01 10*3/MM3 (ref 0–0.05)
IMM GRANULOCYTES NFR BLD AUTO: 0.1 % (ref 0–0.5)
LYMPHOCYTES # BLD AUTO: 0.59 10*3/MM3 (ref 0.7–3.1)
LYMPHOCYTES NFR BLD AUTO: 8.2 % (ref 19.6–45.3)
LYMPHOCYTES NFR FLD MANUAL: 2 %
MCH RBC QN AUTO: 31.1 PG (ref 26.6–33)
MCHC RBC AUTO-ENTMCNC: 32.3 G/DL (ref 31.5–35.7)
MCV RBC AUTO: 96.2 FL (ref 79–97)
MONOCYTES # BLD AUTO: 0.64 10*3/MM3 (ref 0.1–0.9)
MONOCYTES NFR BLD AUTO: 8.9 % (ref 5–12)
MONOCYTES NFR FLD: 5 %
NEUTROPHILS NFR BLD AUTO: 5.82 10*3/MM3 (ref 1.7–7)
NEUTROPHILS NFR BLD AUTO: 81.4 % (ref 42.7–76)
NEUTROPHILS NFR FLD MANUAL: 93 %
NRBC BLD AUTO-RTO: 0 /100 WBC (ref 0–0.2)
PLATELET # BLD AUTO: 357 10*3/MM3 (ref 140–450)
PMV BLD AUTO: 10.5 FL (ref 6–12)
POTASSIUM SERPL-SCNC: 3.7 MMOL/L (ref 3.5–5.2)
PROT SERPL-MCNC: 6.6 G/DL (ref 6–8.5)
RBC # BLD AUTO: 4.18 10*6/MM3 (ref 3.77–5.28)
RBC # FLD AUTO: ABNORMAL /MM3
SODIUM SERPL-SCNC: 130 MMOL/L (ref 136–145)
WBC # FLD AUTO: 7122 /MM3
WBC NRBC COR # BLD AUTO: 7.16 10*3/MM3 (ref 3.4–10.8)

## 2025-04-06 PROCEDURE — 87205 SMEAR GRAM STAIN: CPT | Performed by: ORTHOPAEDIC SURGERY

## 2025-04-06 PROCEDURE — 85025 COMPLETE CBC W/AUTO DIFF WBC: CPT | Performed by: NURSE PRACTITIONER

## 2025-04-06 PROCEDURE — 63710000001 INSULIN LISPRO (HUMAN) PER 5 UNITS: Performed by: NURSE PRACTITIONER

## 2025-04-06 PROCEDURE — 87116 MYCOBACTERIA CULTURE: CPT | Performed by: ORTHOPAEDIC SURGERY

## 2025-04-06 PROCEDURE — 97116 GAIT TRAINING THERAPY: CPT

## 2025-04-06 PROCEDURE — 80053 COMPREHEN METABOLIC PANEL: CPT | Performed by: NURSE PRACTITIONER

## 2025-04-06 PROCEDURE — 89060 EXAM SYNOVIAL FLUID CRYSTALS: CPT | Performed by: ORTHOPAEDIC SURGERY

## 2025-04-06 PROCEDURE — 82948 REAGENT STRIP/BLOOD GLUCOSE: CPT

## 2025-04-06 PROCEDURE — 87075 CULTR BACTERIA EXCEPT BLOOD: CPT | Performed by: ORTHOPAEDIC SURGERY

## 2025-04-06 PROCEDURE — 89051 BODY FLUID CELL COUNT: CPT | Performed by: ORTHOPAEDIC SURGERY

## 2025-04-06 PROCEDURE — 87102 FUNGUS ISOLATION CULTURE: CPT | Performed by: ORTHOPAEDIC SURGERY

## 2025-04-06 PROCEDURE — 97161 PT EVAL LOW COMPLEX 20 MIN: CPT

## 2025-04-06 PROCEDURE — 87206 SMEAR FLUORESCENT/ACID STAI: CPT | Performed by: ORTHOPAEDIC SURGERY

## 2025-04-06 PROCEDURE — 25010000002 LIDOCAINE PF 1% 1 % SOLUTION

## 2025-04-06 PROCEDURE — 87070 CULTURE OTHR SPECIMN AEROBIC: CPT | Performed by: ORTHOPAEDIC SURGERY

## 2025-04-06 PROCEDURE — G0378 HOSPITAL OBSERVATION PER HR: HCPCS

## 2025-04-06 PROCEDURE — 99239 HOSP IP/OBS DSCHRG MGMT >30: CPT | Performed by: STUDENT IN AN ORGANIZED HEALTH CARE EDUCATION/TRAINING PROGRAM

## 2025-04-06 PROCEDURE — 97165 OT EVAL LOW COMPLEX 30 MIN: CPT

## 2025-04-06 PROCEDURE — 97530 THERAPEUTIC ACTIVITIES: CPT

## 2025-04-06 RX ORDER — TRIAMCINOLONE ACETONIDE 40 MG/ML
40 INJECTION, SUSPENSION INTRA-ARTICULAR; INTRAMUSCULAR ONCE
Status: DISCONTINUED | OUTPATIENT
Start: 2025-04-06 | End: 2025-04-06 | Stop reason: HOSPADM

## 2025-04-06 RX ORDER — BUPIVACAINE HYDROCHLORIDE AND EPINEPHRINE 5; 5 MG/ML; UG/ML
10 INJECTION, SOLUTION EPIDURAL; INTRACAUDAL; PERINEURAL ONCE
Status: DISCONTINUED | OUTPATIENT
Start: 2025-04-06 | End: 2025-04-06 | Stop reason: HOSPADM

## 2025-04-06 RX ORDER — LOSARTAN POTASSIUM 100 MG/1
100 TABLET ORAL
Qty: 30 TABLET | Refills: 0 | Status: ON HOLD | OUTPATIENT
Start: 2025-04-07

## 2025-04-06 RX ORDER — LIDOCAINE HYDROCHLORIDE 10 MG/ML
INJECTION, SOLUTION EPIDURAL; INFILTRATION; INTRACAUDAL; PERINEURAL
Status: COMPLETED
Start: 2025-04-06 | End: 2025-04-06

## 2025-04-06 RX ADMIN — INSULIN LISPRO 2 UNITS: 100 INJECTION, SOLUTION INTRAVENOUS; SUBCUTANEOUS at 09:02

## 2025-04-06 RX ADMIN — LEVOTHYROXINE SODIUM 100 MCG: 0.1 TABLET ORAL at 09:03

## 2025-04-06 RX ADMIN — INSULIN LISPRO 2 UNITS: 100 INJECTION, SOLUTION INTRAVENOUS; SUBCUTANEOUS at 12:36

## 2025-04-06 RX ADMIN — ATORVASTATIN CALCIUM 40 MG: 40 TABLET, FILM COATED ORAL at 09:03

## 2025-04-06 RX ADMIN — PANTOPRAZOLE SODIUM 40 MG: 40 TABLET, DELAYED RELEASE ORAL at 09:03

## 2025-04-06 RX ADMIN — FLUOXETINE HYDROCHLORIDE 10 MG: 10 CAPSULE ORAL at 09:03

## 2025-04-06 RX ADMIN — BUPROPION HYDROCHLORIDE 150 MG: 150 TABLET, EXTENDED RELEASE ORAL at 09:03

## 2025-04-06 RX ADMIN — HYDROCODONE BITARTRATE AND ACETAMINOPHEN 1 TABLET: 5; 325 TABLET ORAL at 09:03

## 2025-04-06 RX ADMIN — Medication 10 ML: at 09:05

## 2025-04-06 RX ADMIN — LOSARTAN POTASSIUM 100 MG: 50 TABLET, FILM COATED ORAL at 09:02

## 2025-04-06 RX ADMIN — LIDOCAINE HYDROCHLORIDE: 10 INJECTION, SOLUTION EPIDURAL; INFILTRATION; INTRACAUDAL; PERINEURAL at 09:04

## 2025-04-06 NOTE — THERAPY EVALUATION
Patient Name: Mary Irving  : 1940    MRN: [id number]                              Today's Date: 2025       Admit Date: 2025    Visit Dx:     ICD-10-CM ICD-9-CM   1. Acute medial meniscus tear of right knee, initial encounter  S83.241A 836.0   2. Rupture of anterior cruciate ligament of right knee, initial encounter  S83.511A 844.2   3. Inability to ambulate due to right knee  R26.2 719.7     Patient Active Problem List   Diagnosis    Severe obstructive sleep apnea    Primary hypertension    Anxiety associated with depression    Dyslipidemia    Hypothyroidism (acquired)    Degenerative arthritis    Fatigue    Obesity (BMI 30-39.9)    Mild intermittent asthma    Weakness of both lower extremities    Right knee pain    Mixed hyperlipidemia    Primary osteoarthritis of right knee    Fatigue    Acute torn meniscus     Past Medical History:   Diagnosis Date    Acid reflux     Anemia     Arthritis     Arthritis     Asthma     Breast injury     HIT LT BREAST WITH HANDLEBAR AS YOUNG CHILD    Depression     Diabetes     Disease of thyroid gland     History of stress test     20 plus years ago    Hyperlipidemia     Hypertension     Thyroid disorder      Past Surgical History:   Procedure Laterality Date    BREAST BIOPSY Right 2010    CATARACT EXTRACTION, BILATERAL      FOOT SURGERY Bilateral     HYSTERECTOMY      AGE 42    OOPHORECTOMY Bilateral     AGE 42      General Information       Row Name 25 0828          Physical Therapy Time and Intention    Document Type evaluation  -CT     Mode of Treatment physical therapy  -CT       Row Name 25 0828          General Information    Patient Profile Reviewed yes  -CT     Prior Level of Function independent:;all household mobility;community mobility;gait;transfer;bed mobility;driving;home management;grooming  -CT     Barriers to Rehab previous functional deficit  -CT       Row Name 25 0850 25 0828       Living Environment    Current  Living Arrangements home  -CT home  -CT    People in Home alone  -CT --      Row Name 04/06/25 0850          Home Main Entrance    Number of Stairs, Main Entrance --  does not have to access 2 nd floor.  -CT       Row Name 04/06/25 0850          Stairs Within Home, Primary    Number of Stairs, Within Home, Primary eight  -CT     Stair Railings, Within Home, Primary railings safe and in good condition  -CT     Stairs Comment, Within Home, Primary does not have to access 2 nd floor.  -CT       Row Name 04/06/25 0828          Cognition    Orientation Status (Cognition) oriented x 4  -CT       Row Name 04/06/25 0828          Safety Issues/Impairments Affecting Functional Mobility    Safety Issues Affecting Function (Mobility) positioning of assistive device;safety precaution awareness  -CT     Impairments Affecting Function (Mobility) balance;coordination;endurance/activity tolerance;muscle tone abnormal  -CT     Comment, Safety Issues/Impairments (Mobility) To observation the knee is swollen with poor patellar glide.  Her SLR and QS are intact with good strength.  She has used a RW for ambulation in the past or a cane.  -CT               User Key  (r) = Recorded By, (t) = Taken By, (c) = Cosigned By      Initials Name Provider Type    CT Kong Harris, PT Physical Therapist                   Mobility       Row Name 04/06/25 0868          Bed Mobility    Bed Mobility bed mobility (all) activities  -CT     All Activities, [place] (Bed Mobility) modified independence  -CT     Assistive Device (Bed Mobility) head of bed elevated;bed rails  -CT       Row Name 04/06/25 0890          Bed-Chair Transfer    Bed-Chair [place] (Transfers) modified independence;verbal cues  -CT     Assistive Device (Bed-Chair Transfers) walker, front-wheeled  -CT       Row Name 04/06/25 0878          Sit-Stand Transfer    Sit-Stand [place] (Transfers) modified independence  -CT     Assistive Device (Sit-Stand Transfers)  walker, front-wheeled  -CT       Row Name 04/06/25 0852          Gait/Stairs (Locomotion)    [place] Level (Gait) modified independence;verbal cues;1 person assist  -CT     Assistive Device (Gait) walker, front-wheeled  cues to heel strike and for WBAT gait with walker.  -CT     Patient was able to Ambulate yes  -CT     Distance in Feet (Gait) 35  -CT     Deviations/Abnormal Patterns (Gait) weight shifting decreased  -CT     Left Sided Gait Deviations decreased knee extension  decreased heel stike  -CT     Maintains Weight-bearing Status (Gait) able to maintain  -CT               User Key  (r) = Recorded By, (t) = Taken By, (c) = Cosigned By      Initials Name Provider Type    CT Kong Harris, PT Physical Therapist                   Obj/Interventions       Row Name 04/06/25 0854          Range of Motion Comprehensive    General Range of Motion lower extremity range of motion deficits identified  -CT     Comment, General Range of Motion extension -10 flexion 90 with pain.  -CT       Row Name 04/06/25 0854          Strength Comprehensive (MMT)    General Manual Muscle Testing (MMT) Assessment lower extremity strength deficits identified  -CT     Comment, General Manual Muscle Testing (MMT) Assessment SLR 4+, deferred formal knee flex and ext MMT.  UE scapular depressor 4+ or better.  -CT       Row Name 04/06/25 0854          Motor Skills    Motor Skills functional endurance;muscle tone  -CT     Muscle Tone mild impairment  -CT     Therapeutic Exercise knee;hip  -CT       Row Name 04/06/25 0854          Hip (Therapeutic Exercise)    Hip (Therapeutic Exercise) AROM (active range of motion);isometric exercises  -CT     Hip AROM (Therapeutic Exercise) right;flexion;extension;10 repetitions  -CT     Hip Isometrics (Therapeutic Exercise) right;hamstring sets;extension;flexion  -CT       Row Name 04/06/25 0854          Balance    Balance Assessment sitting static balance;standing static balance;standing  dynamic balance;sitting dynamic balance  -CT     Static Sitting Balance independent  -CT     Dynamic Sitting Balance modified independence;supervision  -CT     Position, Sitting Balance unsupported;sitting edge of bed  -CT     Static Standing Balance supervision;verbal cues  -CT     Dynamic Standing Balance supervision;verbal cues  -CT     Position/Device Used, Standing Balance walker, front-wheeled  -CT     Balance Interventions sitting;standing;sit to stand;supported;static;dynamic;weight shifting activity  -CT               User Key  (r) = Recorded By, (t) = Taken By, (c) = Cosigned By      Initials Name Provider Type    CT Kong Harris, PT Physical Therapist                   Goals/Plan       Row Name 04/06/25 0835          Bed Mobility Goal 1 (PT)    Activity/Assistive Device (Bed Mobility Goal 1, PT) bed mobility activities, all;rolling to left;rolling to right  -CT     [place] Level/Cues Needed (Bed Mobility Goal 1, PT) independent  -CT     Time Frame (Bed Mobility Goal 1, PT) short term goal (STG);3 days  -CT       Row Name 04/06/25 0835          Transfer Goal 1 (PT)    Activity/Assistive Device (Transfer Goal 1, PT) transfers, all  -CT     [place] Level/Cues Needed (Transfer Goal 1, PT) independent  -CT     Time Frame (Transfer Goal 1, PT) short term goal (STG);3 days  -CT       Row Name 04/06/25 0835          Gait Training Goal 1 (PT)    Activity/Assistive Device (Gait Training Goal 1, PT) gait (walking locomotion);walker, rolling  -CT     [place] Level (Gait Training Goal 1, PT) modified independence  -CT     Distance (Gait Training Goal 1, PT) 150  -CT     Time Frame (Gait Training Goal 1, PT) short term goal (STG);3 days  -CT               User Key  (r) = Recorded By, (t) = Taken By, (c) = Cosigned By      Initials Name Provider Type    CT Kong Harris, PT Physical Therapist                   Clinical Impression       Row Name 04/06/25 0858          Pain     Pretreatment Pain Rating 3/10  -CT     Posttreatment Pain Rating 3/10  -CT     Pain Location knee  -CT     Pain Side/Orientation right  -CT     Pain Management Interventions activity modification encouraged;cold applied;movement retraining implemented  -CT     Response to Pain Interventions activity level improved;activity participation with tolerable pain  -CT       Row Name 04/06/25 0858          Plan of Care Review    Plan of Care Reviewed With patient;caregiver  -CT     Progress improving  -CT     Outcome Evaluation Eval completed MOD IND for transfers.  Supervision for balance and gait as this is first time up. OOB in chair for breakfast.  DR Dent in to aspirate knee agreed pt could be WBAT with walker.  Current plan non op management and possible steriod injection to mitigate pain.  -CT       Row Name 04/06/25 0858          Therapy Assessment/Plan (PT)    Rehab Potential (PT) good  -CT     Criteria for Skilled Interventions Met (PT) yes  -CT     Therapy Frequency (PT) daily  -CT       Row Name 04/06/25 0858          Positioning and Restraints    Pre-Treatment Position in bed  -CT     Post Treatment Position chair  -CT     In Chair notified nsg;reclined;sitting;call light within reach;with family/caregiver;legs elevated  -CT               User Key  (r) = Recorded By, (t) = Taken By, (c) = Cosigned By      Initials Name Provider Type    CT Kong Harris, PT Physical Therapist                   Outcome Measures    No documentation.                                Physical Therapy Education       Title: PT OT SLP Therapies (Done)       Topic: Physical Therapy (Done)       Point: Mobility training (Done)       Learning Progress Summary            Patient Acceptance, E,D, DU by CT at 4/6/2025 0901                      Point: Home exercise program (Done)       Learning Progress Summary            Patient Acceptance, E,D, DU by CT at 4/6/2025 0901                      Point: Body mechanics (Done)        Learning Progress Summary            Patient Acceptance, E,D, DU by CT at 4/6/2025 0901                      Point: Precautions (Done)       Learning Progress Summary            Patient Acceptance, E,D, DU by CT at 4/6/2025 0901                                      User Key       Initials Effective Dates Name Provider Type Discipline    CT 07/07/23 -  Kong Harris, PT Physical Therapist PT                  PT Recommendation and Plan     Progress: improving  Outcome Evaluation: Eval completed MOD IND for transfers.  Supervision for balance and gait as this is first time up. OOB in chair for breakfast.  DR Dent in to aspirate knee agreed pt could be WBAT with walker.  Current plan non op management and possible steriod injection to mitigate pain.     Time Calculation:         PT Charges       Row Name 04/06/25 0902             Time Calculation    Start Time 0815  -CT      PT Received On 04/06/25  -CT      PT Goal Re-Cert Due Date 04/17/25  -CT         Time Calculation- PT    Total Timed Code Minutes- PT 45 minute(s)  -CT         Timed Charges    83484 - Gait Training Minutes  25  -CT      08337 - PT Therapeutic Activity Minutes 20  -CT         Untimed Charges    PT Eval/Re-eval Minutes 25  -CT         Total Minutes    Timed Charges Total Minutes 45  -CT      Untimed Charges Total Minutes 25  -CT       Total Minutes 70  -CT                User Key  (r) = Recorded By, (t) = Taken By, (c) = Cosigned By      Initials Name Provider Type    CT Kong Harris, PT Physical Therapist                  Therapy Charges for Today       Code Description Service Date Service Provider Modifiers Qty    45507435111 HC GAIT TRAINING EA 15 MIN 4/6/2025 Kong Harris, PT GP 2    41060554797 HC PT THERAPEUTIC ACT EA 15 MIN 4/6/2025 Kong Harris, PT GP 1    95794627872 HC PT EVAL LOW COMPLEXITY 2 4/6/2025 Kong Harris, PT GP 1            PT G-Codes  AM-PAC 6 Clicks Score (PT): 14  PT  Discharge Summary  Anticipated Discharge Disposition (PT): home with home health, home with assist, home with outpatient therapy services    Kong Harris, PT  4/6/2025

## 2025-04-06 NOTE — PROGRESS NOTES
Orthopedic brief note    Followed up with the patient this afternoon.  Her cell counts low.  She would like a steroid injection.  This was done today.  Follow-up with me in clinic.  Okay to discharge from my perspective.      Injection note    Discussed the indications, risk, benefits, alternatives and recovery in detail of a right knee steroid injection.  She gave informed verbal consent.  The skin over the superior lateral aspect of the right knee was prepped with alcohol followed by Betadine.  Using sterile procedure approximately 4 cc of Marcaine mixed with 40 mg of Kenalog was injected.  Bandage applied.  She tolerated it well.

## 2025-04-06 NOTE — CONSULTS
Orthopedic consult note    History of present illness: Ms. Irving an 84-year-old female presenting to the ER with right knee pain.  She twisted her knee yesterday.  She has a known history of osteoarthritis.  She used to get steroid injections that did help somewhat.  MRI demonstrated meniscus and ACL tears so orthopedics was consulted.    Review of Systems  Pertinent items are noted in HPI    History  Past Medical History:   Diagnosis Date    Acid reflux     Anemia     Arthritis     Arthritis     Asthma     Breast injury     HIT LT BREAST WITH HANDLEBAR AS YOUNG CHILD    Depression     Diabetes     Disease of thyroid gland     History of stress test     20 plus years ago    Hyperlipidemia     Hypertension     Thyroid disorder    ,   Past Surgical History:   Procedure Laterality Date    BREAST BIOPSY Right 2010    CATARACT EXTRACTION, BILATERAL      FOOT SURGERY Bilateral     HYSTERECTOMY      AGE 42    OOPHORECTOMY Bilateral     AGE 42   ,   Family History   Problem Relation Age of Onset    Thyroid disease Mother     Heart disease Father     Dementia Sister     Ovarian cancer Neg Hx     Breast cancer Neg Hx    ,   Social History     Socioeconomic History    Marital status:    Tobacco Use    Smoking status: Former     Current packs/day: 0.00     Average packs/day: 0.5 packs/day for 20.0 years (10.0 ttl pk-yrs)     Types: Cigarettes     Start date:      Quit date:      Years since quittin.2    Smokeless tobacco: Never   Vaping Use    Vaping status: Never Used   Substance and Sexual Activity    Alcohol use: No    Drug use: No    Sexual activity: Defer     E-cigarette/Vaping    E-cigarette/Vaping Use Never User      E-cigarette/Vaping Substances     E-cigarette/Vaping Devices         , Scheduled Meds:  atorvastatin, 40 mg, Oral, Daily  buPROPion XL, 150 mg, Oral, Daily  FLUoxetine, 10 mg, Oral, Daily  losartan, 100 mg, Oral, Q24H   And  [Held by provider] hydroCHLOROthiazide, 25 mg, Oral,  Q24H  insulin lispro, 2-7 Units, Subcutaneous, 4x Daily AC & at Bedtime  levothyroxine, 100 mcg, Oral, Daily  [Held by provider] modafinil, 200 mg, Oral, Daily  pantoprazole, 40 mg, Oral, BID AC  sodium chloride, 10 mL, Intravenous, Q12H   , and Allergies:  Bee venom, Dust mite extract, Prednisone, and Sulfa antibiotics    Objective     Vital Signs   Temp:  [97.8 °F (36.6 °C)-98.8 °F (37.1 °C)] 98 °F (36.7 °C)  Heart Rate:  [65-80] 72  Resp:  [18-20] 18  BP: (110-158)/(63-99) 133/81    Physical Exam:   On exam she is resting comfortably in bed.  Heart regular by peripheral palpation.  Nonlabored breathing on room air.  Exam of the right lower extremity shows an effusion of the right knee.  No erythema.  Neurovascularly intact distally.  Minimal right knee range of motion secondary to pain.    Results Review:   I reviewed the labs demonstrating elevated CRP    I reviewed the x-rays demonstrating severe bone-on-bone arthritis of the right knee.  Reviewed the MRI demonstrating tricompartmental degenerative changes including ACL meniscus tear secondary to osteoarthritis.  Large effusion.      Assessment & Plan   Severe right knee osteoarthritis    Acute torn meniscus    Primary hypertension    Anxiety associated with depression    Hypothyroidism (acquired)    Mild intermittent asthma    Right knee pain    Mixed hyperlipidemia    Primary osteoarthritis of right knee    Fatigue      Discussed with the patient and her daughters that she has severe right knee osteoarthritis which is her underlying problem.  Given her large effusion and elevated CRP I did offer to aspirate her knee to send for cell count which we did.  The cell count is low.  Therefore I do recommend conservative treatment with anti-inflammatories if able, physical therapy for range of motion and strengthening.  I did offer her a steroid injection which she is thinking about.      Aspiration note    After obtaining informed verbal consent the skin over the  superior lateral aspect of the right knee was prepped with alcohol and Betadine.  Anesthetized with lidocaine.  Using sterile procedure approximately 30 cc of blood-tinged fluid was aspirated from the knee.  This was sent for cell count and culture.  Sterile bandage was placed.    I discussed the patients findings and my recommendations with patient and family    Pierre Sheehan MD  04/06/25  10:45 EDT    Time:

## 2025-04-06 NOTE — THERAPY EVALUATION
Patient Name: Mary Irving  : 1940    MRN: [id number]                              Today's Date: 2025       Admit Date: 2025    Visit Dx:     ICD-10-CM ICD-9-CM   1. Acute medial meniscus tear of right knee, initial encounter  S83.241A 836.0   2. Rupture of anterior cruciate ligament of right knee, initial encounter  S83.511A 844.2   3. Inability to ambulate due to right knee  R26.2 719.7     Patient Active Problem List   Diagnosis    Severe obstructive sleep apnea    Primary hypertension    Anxiety associated with depression    Dyslipidemia    Hypothyroidism (acquired)    Degenerative arthritis    Fatigue    Obesity (BMI 30-39.9)    Mild intermittent asthma    Weakness of both lower extremities    Right knee pain    Mixed hyperlipidemia    Primary osteoarthritis of right knee    Fatigue    Acute torn meniscus    Hyponatremia     Past Medical History:   Diagnosis Date    Acid reflux     Anemia     Arthritis     Arthritis     Asthma     Breast injury     HIT LT BREAST WITH HANDLEBAR AS YOUNG CHILD    Depression     Diabetes     Disease of thyroid gland     History of stress test     20 plus years ago    Hyperlipidemia     Hypertension     Thyroid disorder      Past Surgical History:   Procedure Laterality Date    BREAST BIOPSY Right 2010    CATARACT EXTRACTION, BILATERAL      FOOT SURGERY Bilateral     HYSTERECTOMY      AGE 42    OOPHORECTOMY Bilateral     AGE 42      General Information       Row Name 25 1406          OT Time and Intention    Document Type evaluation  -MR     Mode of Treatment occupational therapy  -MR       Row Name 25 1406          General Information    Patient Profile Reviewed yes  -MR     Prior Level of Function independent:;all household mobility;community mobility;gait;transfer;bed mobility;ADL's;driving  -MR     Existing Precautions/Restrictions other (see comments)  RLE edema  -MR       Row Name 25 1406          Living Environment    Current Living  Arrangements home  -MR     People in Home alone  -MR Rose Name 04/06/25 1406          Home Main Entrance    Number of Stairs, Main Entrance two  -MR     Stair Railings, Main Entrance railing on right side (ascending)  -MR Rose Name 04/06/25 1406          Stairs Within Home, Primary    Number of Stairs, Within Home, Primary eight  -MR     Stair Railings, Within Home, Primary railings safe and in good condition  -MR     Stairs Comment, Within Home, Primary Pt able to reside on the main floor w/ all needs met  -MR Rose Name 04/06/25 1406          Cognition    Orientation Status (Cognition) oriented x 4  -MR       [place] Name 04/06/25 1406          Safety Issues/Impairments Affecting Functional Mobility    Safety Issues Affecting Function (Mobility) insight into deficits/self-awareness;safety precaution awareness  -MR     Impairments Affecting Function (Mobility) balance;coordination;endurance/activity tolerance;pain  -MR               User Key  (r) = Recorded By, (t) = Taken By, (c) = Cosigned By      Initials Name Provider Type    MR Daniela Noriega, OT Occupational Therapist                     Mobility/ADL's       Row Name 04/06/25 1407          Bed Mobility    Comment, (Bed Mobility) Pt received UIC and left UIC at end of session  -MR Rose Name 04/06/25 1407          Transfers    Transfers sit-stand transfer  -MR     Comment, (Transfers) Multiple STS completed from recliner for toileting d/t incontinence when in standing.  -MR Rose Name 04/06/25 1407          Sit-Stand Transfer    Sit-Stand [place] (Transfers) contact guard;verbal cues  -MR     Assistive Device (Sit-Stand Transfers) walker, front-wheeled  -MR Rose Name 04/06/25 1407          Functional Mobility    Functional Mobility- Ind. Level contact guard assist;verbal cues required;nonverbal cues required (demo/gesture)  -MR     Functional Mobility- Device walker, front-wheeled  -MR     Functional Mobility-Distance (Feet) --    distances w/in pt's room  -MR       Row Name 04/06/25 1407          Activities of Daily Living    BADL Assessment/Intervention lower body dressing;upper body dressing;toileting  -MR       Row Name 04/06/25 1407          Lower Body Dressing Assessment/Training    [place] Level (Lower Body Dressing) don;pants/bottoms;minimum assist (75% patient effort)  -MR     Position (Lower Body Dressing) unsupported sitting;unsupported standing  -MR       Row Name 04/06/25 1407          Upper Body Dressing Assessment/Training    [place] Level (Upper Body Dressing) doff;contact guard assist  -MR     Position (Upper Body Dressing) unsupported sitting  -MR       Row Name 04/06/25 1407          Toileting Assessment/Training    [place] Level (Toileting) toileting skills;moderate assist (50% patient effort)  -MR     Position (Toileting) supported standing;supported sitting  -MR     Comment, (Toileting) Episode of incontinent once in standing  -               User Key  (r) = Recorded By, (t) = Taken By, (c) = Cosigned By      Initials Name Provider Type    MR Daniela Noriega, OT Occupational Therapist                   Obj/Interventions       Row Name 04/06/25 1409          Sensory Assessment (Somatosensory)    Sensory Assessment (Somatosensory) UE sensation intact  -       Row Name 04/06/25 1409          Vision Assessment/Intervention    Visual Impairment/Limitations WFL;corrective lenses full-time  -       Row Name 04/06/25 1409          Range of Motion Comprehensive    General Range of Motion bilateral upper extremity ROM WFL  -       Row Name 04/06/25 1409          Strength Comprehensive (MMT)    Comment, General Manual Muscle Testing (MMT) Assessment BUE grossly 3+/5 in all functional planes  -       Row Name 04/06/25 1409          Balance    Balance Assessment sitting static balance;sitting dynamic balance;standing static balance;standing dynamic balance  -     Static Sitting Balance independent  -      Dynamic Sitting Balance supervision  -MR     Position, Sitting Balance unsupported;sitting in chair  -MR     Static Standing Balance contact guard;standby assist  -MR     Dynamic Standing Balance contact guard  -MR     Position/Device Used, Standing Balance supported;walker, front-wheeled  -MR     Balance Interventions sitting;standing;sit to stand;supported;minimal challenge;static;dynamic  -MR               User Key  (r) = Recorded By, (t) = Taken By, (c) = Cosigned By      Initials Name Provider Type    Daniela Blanton, OT Occupational Therapist                   Goals/Plan       Row Name 04/06/25 1415          Bed Mobility Goal 1 (OT)    Activity/Assistive Device (Bed Mobility Goal 1, OT) sit to supine;supine to sit  -MR     [place] Level/Cues Needed (Bed Mobility Goal 1, OT) contact guard required  -MR     Time Frame (Bed Mobility Goal 1, OT) short term goal (STG);5 days  -MR     Progress/Outcomes (Bed Mobility Goal 1, OT) new goal  -MR       Row Name 04/06/25 1415          Transfer Goal 1 (OT)    Activity/Assistive Device (Transfer Goal 1, OT) sit-to-stand/stand-to-sit;commode, bedside without drop arms  -MR       Row Name 04/06/25 1415          Dressing Goal 1 (OT)    Activity/Device (Dressing Goal 1, OT) lower body dressing;long-handled shoe horn;reacher;sock-aid  -MR     [place]/Cues Needed (Dressing Goal 1, OT) moderate assist (50-74% patient effort)  -MR     Time Frame (Dressing Goal 1, OT) long term goal (LTG);5 days  -MR     Progress/Outcome (Dressing Goal 1, OT) new goal  -MR       Row Name 04/06/25 1415          Toileting Goal 1 (OT)    Activity/Device (Toileting Goal 1, OT) toileting skills, all  -MR     [place] Level/Cues Needed (Toileting Goal 1, OT) supervision required  -MR     Time Frame (Toileting Goal 1, OT) long term goal (LTG);5 days  -MR       Row Name 04/06/25 1415          Therapy Assessment/Plan (OT)    Planned Therapy Interventions (OT) activity tolerance  training;adaptive equipment training;BADL retraining;functional balance retraining;strengthening exercise;transfer/mobility retraining;ROM/therapeutic exercise;IADL retraining;passive ROM/stretching;wheelchair assessment/training;patient/caregiver education/training  -MR               User Key  (r) = Recorded By, (t) = Taken By, (c) = Cosigned By      Initials Name Provider Type    Daniela Blanton, OT Occupational Therapist                   Clinical Impression       Row Name 04/06/25 1410          Pain Assessment    Pain Location knee  -MR     Pain Side/Orientation right  -MR     Additional Documentation Pain Scale: FACES Pre/Post-Treatment (Group)  -MR       Row Name 04/06/25 1410          Pain Scale: FACES Pre/Post-Treatment    Pain: FACES Scale, Pretreatment 0-->no hurt  -MR     Posttreatment Pain Rating 2-->hurts little bit  -MR       Row Name 04/06/25 1410          Plan of Care Review    Plan of Care Reviewed With patient;daughter  -MR     Progress no change  Initial Eval  -MR     Outcome Evaluation Patient presenting at or near her functional baseline. Pt limited by R knee pain requiring assist for transfers, LB dressing and mobility. Deficits warrant skilled OT services. Recommend home w/ assist and HH OT when medically appropriate for d/c.  -MR       Row Name 04/06/25 1410          Therapy Assessment/Plan (OT)    Patient/Family Therapy Goal Statement (OT) Return to PLOF  -MR     Rehab Potential (OT) good  -MR     Criteria for Skilled Therapeutic Interventions Met (OT) yes;meets criteria;skilled treatment is necessary  -MR     Therapy Frequency (OT) daily  -MR     Predicted Duration of Therapy Intervention (OT) 5 days  -MR       Row Name 04/06/25 1410          Therapy Plan Review/Discharge Plan (OT)    Anticipated Discharge Disposition (OT) home with assist;home with outpatient therapy services  -MR       Row Name 04/06/25 1410          Vital Signs    Pre Systolic BP Rehab 109  -MR     Pre Treatment  Diastolic BP 67  -MR     O2 Delivery Pre Treatment room air  -MR     O2 Delivery Intra Treatment room air  -MR     O2 Delivery Post Treatment room air  -MR     Pre Patient Position Sitting  -MR     Intra Patient Position Standing  -MR     Post Patient Position Sitting  -MR       Row Name 04/06/25 1410          Positioning and Restraints    Pre-Treatment Position sitting in chair/recliner  -MR     Post Treatment Position chair  -MR     In Chair notified nsg;reclined;sitting;call light within reach;encouraged to call for assist;exit alarm on;waffle cushion;legs elevated  -MR               User Key  (r) = Recorded By, (t) = Taken By, (c) = Cosigned By      Initials Name Provider Type     HariDaniela, OT Occupational Therapist                   Outcome Measures       Row Name 04/06/25 1421          How much help from another is currently needed...    Putting on and taking off regular lower body clothing? 2  -MR     Bathing (including washing, rinsing, and drying) 2  -MR     Toileting (which includes using toilet bed pan or urinal) 3  -MR     Putting on and taking off regular upper body clothing 3  -MR     Taking care of personal grooming (such as brushing teeth) 4  -MR     Eating meals 4  -MR     AM-PAC 6 Clicks Score (OT) 18  -MR       Row Name 04/06/25 0800          How much help from another person do you currently need...    Turning from your back to your side while in flat bed without using bedrails? 3  -PT     Moving from lying on back to sitting on the side of a flat bed without bedrails? 3  -PT     Moving to and from a bed to a chair (including a wheelchair)? 2  -PT     Standing up from a chair using your arms (e.g., wheelchair, bedside chair)? 3  -PT     Climbing 3-5 steps with a railing? 2  -PT     To walk in hospital room? 2  -PT     AM-PAC 6 Clicks Score (PT) 15  -PT       Row Name 04/06/25 1421          Functional Assessment    Outcome Measure Options AM-PAC 6 Clicks Daily Activity (OT)  -                User Key  (r) = Recorded By, (t) = Taken By, (c) = Cosigned By      Initials Name Provider Type    MR Daniela Noriega, SHERON Occupational Therapist    PT Monet Santo, RN Registered Nurse                    Occupational Therapy Education       Title: PT OT SLP Therapies (In Progress)       Topic: Occupational Therapy (In Progress)       Point: ADL training (Done)       Learning Progress Summary            Patient Acceptance, E, VU by MR at 4/6/2025 1422                      Point: Precautions (Done)       Learning Progress Summary            Patient Acceptance, E, VU by MR at 4/6/2025 1422                      Point: Body mechanics (Done)       Learning Progress Summary            Patient Acceptance, E, VU by MR at 4/6/2025 1422                                      User Key       Initials Effective Dates Name Provider Type Discipline    MR 09/22/22 -  Daniela Noriega OT Occupational Therapist OT                  OT Recommendation and Plan  Planned Therapy Interventions (OT): activity tolerance training, adaptive equipment training, BADL retraining, functional balance retraining, strengthening exercise, transfer/mobility retraining, ROM/therapeutic exercise, IADL retraining, passive ROM/stretching, wheelchair assessment/training, patient/caregiver education/training  Therapy Frequency (OT): daily  Plan of Care Review  Plan of Care Reviewed With: patient, daughter  Progress: no change (Initial Eval)  Outcome Evaluation: Patient presenting at or near her functional baseline. Pt limited by R knee pain requiring assist for transfers, LB dressing and mobility. Deficits warrant skilled OT services. Recommend home w/ assist and  OT when medically appropriate for d/c.     Time Calculation:   Evaluation Complexity (OT)  Review Occupational Profile/Medical/Therapy History Complexity: brief/low complexity  Assessment, Occupational Performance/Identification of Deficit Complexity: 1-3 performance deficits  Clinical  Decision Making Complexity (OT): problem focused assessment/low complexity  Overall Complexity of Evaluation (OT): low complexity     Time Calculation- OT       Row Name 04/06/25 1423 04/06/25 0902          Time Calculation- OT    OT Start Time 1311  -MR --     OT Received On 04/06/25  -MR --     OT Goal Re-Cert Due Date 04/16/25  -MR --        Timed Charges    29360 - Gait Training Minutes  -- 25  -CT        Untimed Charges    OT Eval/Re-eval Minutes 46  -MR --        Total Minutes    Timed Charges Total Minutes -- 25  -CT     Untimed Charges Total Minutes 46  -MR --      Total Minutes 46  -MR 25  -CT               User Key  (r) = Recorded By, (t) = Taken By, (c) = Cosigned By      Initials Name Provider Type    CT Kong Harris, MARY Physical Therapist    MR Daniela Noriega OT Occupational Therapist                  Therapy Charges for Today       Code Description Service Date Service Provider Modifiers Qty    [id number] HC OT EVAL LOW COMPLEXITY 4 4/6/2025 Daniela Noriega OT GO 1                 Daniela Noriega OT  4/6/2025

## 2025-04-06 NOTE — PLAN OF CARE
Goal Outcome Evaluation:  Plan of Care Reviewed With: patient, daughter        Progress: no change (Initial Eval)  Outcome Evaluation: Patient presenting at or near her functional baseline. Pt limited by R knee pain requiring assist for transfers, LB dressing and mobility. Deficits warrant skilled OT services. Recommend home w/ assist and  OT when medically appropriate for d/c.    Anticipated Discharge Disposition (OT): home with assist, home with outpatient therapy services

## 2025-04-06 NOTE — DISCHARGE SUMMARY
[facility] Medicine Services  DISCHARGE SUMMARY    Patient Name: Mary Irving  : 1940  MRN: [id number]    Date of Admission: 2025 10:11 AM  Date of Discharge:  2025  Primary Care Physician: Rosa Isela Bangura MD    Consults       Date and Time Order Name Status Description    2025  9:24 PM Inpatient Orthopedic Surgery Consult Completed             Hospital Course     Presenting Problem: right knee severe right knee osteoarthritis     Active Hospital Problems    Diagnosis  POA    **Acute torn meniscus [S83.209A]  Yes    Hyponatremia [E87.1]  Unknown    Right knee pain [M25.561]  Yes    Mixed hyperlipidemia [E78.2]  Yes    Primary osteoarthritis of right knee [M17.11]  Yes    Fatigue [R53.83]  Yes    Anxiety associated with depression [F41.8]  Yes    Primary hypertension [I10]  Yes    Hypothyroidism (acquired) [E03.9]  Yes    Mild intermittent asthma [J45.20]  Yes      Resolved Hospital Problems   No resolved problems to display.          Hospital Course:  Mary Irving is a 84 y.o. female  with hx of anxiety, depression, fatigue, hypothyroidism, asthma, HTN, HLD, osteoarthritis who presented with intractable right knee pain. MRI right knee: Tricompartmental osteoarthritic arthrosis of knee, high grade chondromalaxia of the medial joint compartment and milder chondromalaxia of the lateral and patellofemoral compartments. Tricompartmental marginal osteophyte formation present. Complex tear/maceration of the medial meniscus. Partial tear of the ACL. Large knee effusion with Baker's cyst measuring 7x1.6 cm.      Medical meniscal tear  Partial tear of ACL  Severe right knee osteoarthritis   Right knee effusion  -Ortho consulted; discussed w Aguila  -Pain management  -S/p knee aspiration bedside w low cell count  -PT/OT rec home w HH, range of motion and strengthening  -Pt not amenable to surgical intervention at this time, but was amenable to steroid injection  of right knee, which was done on 4/6/2025  -Return precautions given     HTN-statin  HLD-losartan; hold HCTZ given hyponatremia on DC; discussed w pt to follow-up w PCP regarding this   DM2-SSI; A1C 6.7%  GERD-PPI  Depression, anxiety-wellbutrin, prozac      Discharge Follow Up Recommendations for outpatient labs/diagnostics:  Follow-up with primary care doctor in 5 to 7 days regarding this hospitalization, chronic disease management, blood pressure management, repeat labs (CBC, CMP)  Follow-up with Dr. Sheehan in 1 month regarding right knee arthritis    Day of Discharge     HPI:   Patient reports feeling much better today.  Was able to work with physical therapy.  Pain in right knee dramatically improved after aspiration.  No chest pain or shortness of breath.  No nausea or vomiting.  No diarrhea.  No urinary symptoms.  Patient's daughter at bedside and patient okay with her being updated.  Patient's daughter plans to stay with her at home for the next few days after discharge.    Review of Systems  As above    Vital Signs:   Temp:  [97 °F (36.1 °C)-98.8 °F (37.1 °C)] 97 °F (36.1 °C)  Heart Rate:  [63-76] 63  Resp:  [18-20] 18  BP: (109-148)/(63-87) 109/67      Physical Exam:  Constitutional: No acute distress, awake, alert, up in bedside chair  HENT: NCAT, mucous membranes moist  Respiratory: Clear to auscultation bilaterally, respiratory effort normal   Cardiovascular: RRR  Gastrointestinal: Positive bowel sounds, soft, nontender, nondistended  Musculoskeletal: No bilateral ankle edema; right knee swollen, no erythema, no redness, nontender to palpation  Psychiatric: Appropriate affect, cooperative  Neurologic: Alert, oriented, symmetric facies, moves all extremities, speech clear  Skin: No rashes    Pertinent  and/or Most Recent Results     LAB RESULTS:      Lab 04/06/25  1023 04/05/25 2009 04/05/25 1808   WBC 7.16  --  10.13   HEMOGLOBIN 13.0  --  13.4   HEMATOCRIT 40.2  --  40.1   PLATELETS 357  --  384    NEUTROS ABS 5.82  --  7.96*   IMMATURE GRANS (ABS) 0.01  --  0.03   LYMPHS ABS 0.59*  --  0.98   MONOS ABS 0.64  --  1.10*   EOS ABS 0.08  --  0.02   MCV 96.2  --  93.5   SED RATE  --  44*  --    CRP  --  8.66*  --          Lab 04/06/25  1023 04/05/25  2009 04/05/25  1808   SODIUM 130* 130*  --    POTASSIUM 3.7 3.8  --    CHLORIDE 96* 94*  --    CO2 22.0 25.0  --    ANION GAP 12.0 11.0  --    BUN 11 10  --    CREATININE 0.71 0.57  --    EGFR 85.0 90.9  --    GLUCOSE 303* 230*  --    CALCIUM 8.8 9.2  --    HEMOGLOBIN A1C  --   --  6.70*   TSH  --  3.190  --          Lab 04/06/25 1023 04/05/25 2009   TOTAL PROTEIN 6.6 6.7   ALBUMIN 3.7 3.9   GLOBULIN 2.9 2.8   ALT (SGPT) 14 11   AST (SGOT) 16 17   BILIRUBIN 0.6 0.7   ALK PHOS 165* 174*                     Brief Urine Lab Results  (Last result in the past 365 days)        Color   Clarity   Blood   Leuk Est   Nitrite   Protein   CREAT   Urine HCG        04/05/25 1109 Yellow   Clear   Negative   Negative   Negative   Negative                 Microbiology Results (last 10 days)       ** No results found for the last 240 hours. **            MRI Knee Right Without Contrast  Result Date: 4/5/2025  MRI KNEE RIGHT  WO CONTRAST Date of Exam: 4/5/2025 12:12 PM EDT Indication: inability to ambulate, pain, swelling.  Comparison: Right knee radiographs dated 4/5/2025 Technique:  Routine multiplanar/multisequence images of the right knee were obtained without contrast administration. FINDINGS: BONE AND JOINT: Tricompartmental osteoarthritic changes of the knee are present. There is high-grade chondromalacia of the medial joint compartment with milder chondromalacia of the lateral and patellofemoral compartments. Tricompartment marginal osteophyte formation is noted. Bone marrow signal is unremarkable. Large knee effusion is present. TENDONS AND LIGAMENTS: There is thickening and intermediate signal throughout the anterior cruciate ligament. There appear to be at least some  intact ACL fibers. Findings may represent mucoid degeneration or partial tearing. Please correlate with findings on physical examination. PCL, quadriceps tendon, patellar tendon, patellar retinacula, medial collateral ligament, lateral collateral ligamentous complex, and popliteus tendon appear intact. MENISCI: Complex tear/maceration of the medial meniscus. Lateral meniscus appears intact. SOFT TISSUES: Large septated Baker's cyst measuring approximately 7 x 1.6 cm. Additional lobulated fluid signal posteriorly about the knee may represent joint fluid and/or ganglion cyst formation.Scattered soft tissue edema about the knee. No muscle edema or muscle atrophy is seen.     1.Tricompartmental osteoarthritic arthrosis of the knee with high-grade chondromalacia of the medial joint compartment and milder chondromalacia of the lateral and patellofemoral compartments. Tricompartment marginal osteophyte formation is present. 2.Complex tear/maceration of the medial meniscus. 3.Thickening and intermediate signal throughout the anterior cruciate ligament. There appear to be at least some intact ACL fibers. Findings may represent mucoid degeneration or partial tearing. Please correlate with findings on physical examination. 4.Large knee effusion with a septated Baker's cyst measuring 7 x 1.6 cm. Additional lobulated fluid signal posteriorly about the knee may represent joint fluid and/or ganglion cyst formation. 5.Additional findings as above. Electronically Signed: Eder Chatman MD  4/5/2025 12:49 PM EDT  Workstation ID: [id number]    XR Knee 1 or 2 View Right  Result Date: 4/5/2025  XR KNEE 1 OR 2 VW RIGHT Date of Exam: 4/5/2025 10:38 AM EDT Indication: Right knee pain with swelling. Comparison: None available. Findings: There is narrowing, subchondral sclerosis, cortical irregularity, and spurring of the medial compartment and patellofemoral compartment consistent with moderate-severe osteoarthritis. There is mild spurring  of the lateral compartment also indicating arthritis. The bony structures are demineralized. There is no acute fracture or dislocation. The patient has a moderate sized suprapatellar joint effusion.     Impression: 1.No acute fracture or dislocation. 2.Degenerative changes. 3.Moderate suprapatellar joint effusion. Electronically Signed: Jose Rivera MD  4/5/2025 11:08 AM EDT  Workstation ID: [id number]              Results for orders placed during the hospital encounter of 11/10/23    Adult Transthoracic Echo Complete W/ Cont if Necessary Per Protocol    Interpretation Summary    Left ventricular ejection fraction appears to be greater than 70%.  grade 1 diastolic dysfunction    mild aortic valve insuficiency    Estimated right ventricular systolic pressure from tricuspid regurgitation is normal (<35 mmHg).      Plan for Follow-up of Pending Labs/Results: Ortho  Pending Labs       Order Current Status    AFB Culture - Body Fluid, Knee, Right In process    Anaerobic Culture - Body Fluid, Knee, Right In process    Body Fluid Culture - Body Fluid, Knee, Right In process    Fungus Culture - Body Fluid, Knee, Right In process          Discharge Details        Discharge Medications        New Medications        Instructions Start Date   losartan 100 MG tablet  Commonly known as: COZAAR  Replaces: losartan-hydrochlorothiazide 100-25 MG per tablet   100 mg, Oral, Every 24 Hours Scheduled   Start Date: April 7, 2025            Continue These Medications        Instructions Start Date   armodafinil 150 MG tablet  Commonly known as: Nuvigil   150 mg, Oral, Daily      aspirin 81 MG EC tablet   81 mg, Daily      atorvastatin 40 MG tablet  Commonly known as: LIPITOR   40 mg, Daily      buPROPion  MG 24 hr tablet  Commonly known as: WELLBUTRIN XL   150 mg, Daily      Calcium 250 MG capsule   Daily      FLUoxetine 20 MG capsule  Commonly known as: PROzac   20 mg, Oral, Daily      IRON (FERROUS GLUCONATE) PO   Take  by mouth.       levothyroxine 88 MCG tablet  Commonly known as: SYNTHROID, LEVOTHROID   100 mcg, Daily      metFORMIN 500 MG tablet  Commonly known as: GLUCOPHAGE   500 mg, 2 Times Daily With Meals      omeprazole 40 MG capsule  Commonly known as: priLOSEC   40 mg, 2 Times Daily      Ventolin  (90 Base) MCG/ACT inhaler  Generic drug: albuterol sulfate HFA   As Needed      vitamin B-12 1000 MCG tablet  Commonly known as: CYANOCOBALAMIN   1,000 mcg, Daily             Stop These Medications      losartan-hydrochlorothiazide 100-25 MG per tablet  Commonly known as: HYZAAR  Replaced by: losartan 100 MG tablet              Allergies   Allergen Reactions    Bee Venom Unknown - Low Severity    Dust Mite Extract Unknown - Low Severity    Prednisone Unknown - Low Severity    Sulfa Antibiotics Unknown - High Severity and Unknown - Low Severity         Discharge Disposition:  Home-Health Care [facility]    Diet:  Hospital:  Diet Order   Procedures    Diet: Cardiac, Diabetic, Vegetarian; Lacto-Ovo Vegetarian (Allows dairy, eggs); Healthy Heart (2-3 Na+); Consistent Carbohydrate; Fluid Consistency: Thin (IDDSI 0)       Diet Instructions       Diet: Cardiac Diets, Diabetic Diets, Vegetarian; Lacto-Ovo Vegetarian (Allows dairy, eggs); Regular (IDDSI 7); Thin (IDDSI 0); Healthy Heart (2-3 Na+); Consistent Carbohydrate      Discharge Diet:  Cardiac Diets  Diabetic Diets  Vegetarian       Vegetarian: Lacto-Ovo Vegetarian (Allows dairy, eggs)    Texture: Regular (IDDSI 7)    Fluid Consistency: Thin (IDDSI 0)    Cardiac Diet: Healthy Heart (2-3 Na+)    Diabetic Diet: Consistent Carbohydrate             Activity:      Restrictions or Other Recommendations:  Activity as tolerated       CODE STATUS:    Code Status and Medical Interventions: No CPR (Do Not Attempt to Resuscitate); Limited Support; No intubation (DNI)   Ordered at: 04/05/25 6672     Code Status (Patient has no pulse and is not breathing):    No CPR (Do Not Attempt to Resuscitate)      Medical Interventions (Patient has pulse or is breathing):    Limited Support     Medical Intervention Limits:    No intubation (DNI)     Level Of Support Discussed With:    Patient       Future Appointments   Date Time Provider Department Center   7/17/2025  1:15 PM Jenny Bond APRN [facility]       Additional Instructions for the Follow-ups that You Need to Schedule       Call MD With Problems / Concerns   As directed      Please seek medical attention for any of the following: Persistent fevers, worsening knee pain, redness or drainage from the knee, and/or any other concerning symptoms    Order Comments: Please seek medical attention for any of the following: Persistent fevers, worsening knee pain, redness or drainage from the knee, and/or any other concerning symptoms         Discharge Follow-up with PCP   As directed       Currently Documented PCP:    Rosa Isela Bangura MD    PCP Phone Number:    [phone]     Follow Up Details: Follow-up with primary care doctor in 5 to 7 days regarding this hospitalization, chronic disease management, blood pressure management, repeat labs (CBC, CMP)        Discharge Follow-up with Specified Provider: Follow-up with Dr. Sheehan in 1 month regarding right knee arthritis   As directed      To: Follow-up with Dr. Sheehan in 1 month regarding right knee arthritis                      Concetta Jones MD  04/06/25      Time Spent on Discharge:  I spent 35 minutes on this discharge activity which included: face-to-face encounter with the patient, reviewing the data in the system, coordination of the care with the nursing staff as well as consultants, documentation, and entering orders.

## 2025-04-06 NOTE — PLAN OF CARE
Goal Outcome Evaluation:  Plan of Care Reviewed With: patient, caregiver        Progress: improving  Outcome Evaluation: Eval completed MOD IND for transfers.  Supervision for balance and gait as this is first time up. OOB in chair for breakfast.  DR Dent in to aspirate knee agreed pt could be WBAT with walker.  Current plan non op management and possible steriod injection to mitigate pain.    Anticipated Discharge Disposition (PT): home with home health, home with assist, home with outpatient therapy services

## 2025-04-06 NOTE — CASE MANAGEMENT/SOCIAL WORK
Discharge Planning Assessment  [facility]     Patient Name: Mary Irving  MRN: [id number]  Today's Date: 4/6/2025    Admit Date: 4/5/2025    Plan: Home with [facility]       Discharge Plan       Row Name 04/06/25 1520       Plan    Plan Home with [facility]    Patient/Family in Agreement with Plan yes    Final Discharge Disposition Code 06 - home with home health care    Final Note I have met [facility] Ms. Dennies at the bedside today to initiate a discharge plan.  She states that she lives inher own home in [place].  Prior to this hospital admission she was independent with activities of daily living and mobility.  She reports use of st cane and rolling walker to assist with mobility.  I have discussed PT/OT recommendations for home health.  She is agreeable to submission of referral to local home health agencies.  I have confirmed with Suzanne at [facility] that they will provide these services.  Ms. Irving' daughter is at bedside and states that she will provide assistance and transportaiton as needed.                  Continued Care and Services - Admitted Since 4/5/2025       Home Medical Care Coordination complete.      Service Provider Request Status Services Address Phone Fax Patient Preferred    [facility] [address], [address], [place] [address] [phone] --                  Expected Discharge Date and Time       Expected Discharge Date Expected Discharge Time    Apr 6, 2025         Elizabeth Do RN

## 2025-04-10 ENCOUNTER — HOSPITAL ENCOUNTER (OUTPATIENT)
Facility: HOSPITAL | Age: 85
Setting detail: OBSERVATION
Discharge: NURSING, REHAB OR LONG-TERM CARE | End: 2025-04-15
Attending: FAMILY MEDICINE | Admitting: INTERNAL MEDICINE
Payer: MEDICARE

## 2025-04-10 ENCOUNTER — APPOINTMENT (OUTPATIENT)
Facility: HOSPITAL | Age: 85
End: 2025-04-10
Payer: MEDICARE

## 2025-04-10 DIAGNOSIS — S32.599D CLOSED FRACTURE OF PUBIC RAMUS, UNSPECIFIED LATERALITY, WITH ROUTINE HEALING, SUBSEQUENT ENCOUNTER: ICD-10-CM

## 2025-04-10 DIAGNOSIS — S32.591A CLOSED FRACTURE OF RIGHT INFERIOR PUBIC RAMUS, INITIAL ENCOUNTER: Primary | ICD-10-CM

## 2025-04-10 DIAGNOSIS — E11.69 TYPE 2 DIABETES MELLITUS WITH OTHER SPECIFIED COMPLICATION, WITHOUT LONG-TERM CURRENT USE OF INSULIN: ICD-10-CM

## 2025-04-10 PROBLEM — S72.001A CLOSED RIGHT HIP FRACTURE: Status: ACTIVE | Noted: 2025-04-10

## 2025-04-10 LAB
ALBUMIN SERPL-MCNC: 4 G/DL (ref 3.5–5.2)
ALBUMIN/GLOB SERPL: 1.3 G/DL
ALP SERPL-CCNC: 177 U/L (ref 39–117)
ALT SERPL W P-5'-P-CCNC: 16 U/L (ref 1–33)
ANION GAP SERPL CALCULATED.3IONS-SCNC: 12.3 MMOL/L (ref 5–15)
AST SERPL-CCNC: 21 U/L (ref 1–32)
BASOPHILS # BLD AUTO: 0 10*3/MM3 (ref 0–0.2)
BASOPHILS NFR BLD AUTO: 0 % (ref 0–1.5)
BILIRUB SERPL-MCNC: 0.5 MG/DL (ref 0–1.2)
BUN SERPL-MCNC: 12 MG/DL (ref 8–23)
BUN/CREAT SERPL: 24.5 (ref 7–25)
CALCIUM SPEC-SCNC: 9.7 MG/DL (ref 8.6–10.5)
CHLORIDE SERPL-SCNC: 102 MMOL/L (ref 98–107)
CO2 SERPL-SCNC: 24.7 MMOL/L (ref 22–29)
CREAT SERPL-MCNC: 0.49 MG/DL (ref 0.57–1)
DEPRECATED RDW RBC AUTO: 42.1 FL (ref 37–54)
EGFRCR SERPLBLD CKD-EPI 2021: 94.2 ML/MIN/1.73
EOSINOPHIL # BLD AUTO: 0.04 10*3/MM3 (ref 0–0.4)
EOSINOPHIL NFR BLD AUTO: 0.4 % (ref 0.3–6.2)
ERYTHROCYTE [DISTWIDTH] IN BLOOD BY AUTOMATED COUNT: 12 % (ref 12.3–15.4)
GLOBULIN UR ELPH-MCNC: 3.2 GM/DL
GLUCOSE SERPL-MCNC: 112 MG/DL (ref 65–99)
HCT VFR BLD AUTO: 45.1 % (ref 34–46.6)
HGB BLD-MCNC: 15 G/DL (ref 12–15.9)
IMM GRANULOCYTES # BLD AUTO: 0.02 10*3/MM3 (ref 0–0.05)
IMM GRANULOCYTES NFR BLD AUTO: 0.2 % (ref 0–0.5)
LYMPHOCYTES # BLD AUTO: 1.52 10*3/MM3 (ref 0.7–3.1)
LYMPHOCYTES NFR BLD AUTO: 17.1 % (ref 19.6–45.3)
MCH RBC QN AUTO: 31.2 PG (ref 26.6–33)
MCHC RBC AUTO-ENTMCNC: 33.3 G/DL (ref 31.5–35.7)
MCV RBC AUTO: 93.8 FL (ref 79–97)
MONOCYTES # BLD AUTO: 0.81 10*3/MM3 (ref 0.1–0.9)
MONOCYTES NFR BLD AUTO: 9.1 % (ref 5–12)
NEUTROPHILS NFR BLD AUTO: 6.51 10*3/MM3 (ref 1.7–7)
NEUTROPHILS NFR BLD AUTO: 73.2 % (ref 42.7–76)
PLATELET # BLD AUTO: 364 10*3/MM3 (ref 140–450)
PMV BLD AUTO: 9.8 FL (ref 6–12)
POTASSIUM SERPL-SCNC: 4.4 MMOL/L (ref 3.5–5.2)
PROT SERPL-MCNC: 7.2 G/DL (ref 6–8.5)
RBC # BLD AUTO: 4.81 10*6/MM3 (ref 3.77–5.28)
SODIUM SERPL-SCNC: 139 MMOL/L (ref 136–145)
WBC NRBC COR # BLD AUTO: 8.9 10*3/MM3 (ref 3.4–10.8)

## 2025-04-10 PROCEDURE — 85025 COMPLETE CBC W/AUTO DIFF WBC: CPT

## 2025-04-10 PROCEDURE — G0378 HOSPITAL OBSERVATION PER HR: HCPCS

## 2025-04-10 PROCEDURE — 99222 1ST HOSP IP/OBS MODERATE 55: CPT | Performed by: PHYSICIAN ASSISTANT

## 2025-04-10 PROCEDURE — 25810000003 SODIUM CHLORIDE 0.9 % SOLUTION: Performed by: PHYSICIAN ASSISTANT

## 2025-04-10 PROCEDURE — 99285 EMERGENCY DEPT VISIT HI MDM: CPT | Performed by: FAMILY MEDICINE

## 2025-04-10 PROCEDURE — 80053 COMPREHEN METABOLIC PANEL: CPT

## 2025-04-10 PROCEDURE — 72192 CT PELVIS W/O DYE: CPT

## 2025-04-10 RX ORDER — LOSARTAN POTASSIUM 50 MG/1
100 TABLET ORAL
Status: DISCONTINUED | OUTPATIENT
Start: 2025-04-11 | End: 2025-04-15 | Stop reason: HOSPADM

## 2025-04-10 RX ORDER — LEVOTHYROXINE SODIUM 100 UG/1
100 TABLET ORAL
Status: DISCONTINUED | OUTPATIENT
Start: 2025-04-11 | End: 2025-04-15 | Stop reason: HOSPADM

## 2025-04-10 RX ORDER — ACETAMINOPHEN 325 MG/1
650 TABLET ORAL EVERY 4 HOURS PRN
Status: DISCONTINUED | OUTPATIENT
Start: 2025-04-10 | End: 2025-04-15 | Stop reason: HOSPADM

## 2025-04-10 RX ORDER — ATORVASTATIN CALCIUM 40 MG/1
40 TABLET, FILM COATED ORAL DAILY
Status: DISCONTINUED | OUTPATIENT
Start: 2025-04-11 | End: 2025-04-15 | Stop reason: HOSPADM

## 2025-04-10 RX ORDER — HYDROCODONE BITARTRATE AND ACETAMINOPHEN 5; 325 MG/1; MG/1
1 TABLET ORAL EVERY 4 HOURS PRN
Refills: 0 | Status: DISCONTINUED | OUTPATIENT
Start: 2025-04-10 | End: 2025-04-15 | Stop reason: HOSPADM

## 2025-04-10 RX ORDER — SODIUM CHLORIDE 0.9 % (FLUSH) 0.9 %
10 SYRINGE (ML) INJECTION EVERY 12 HOURS SCHEDULED
Status: DISCONTINUED | OUTPATIENT
Start: 2025-04-10 | End: 2025-04-15 | Stop reason: HOSPADM

## 2025-04-10 RX ORDER — ACETAMINOPHEN 650 MG/1
650 SUPPOSITORY RECTAL EVERY 4 HOURS PRN
Status: DISCONTINUED | OUTPATIENT
Start: 2025-04-10 | End: 2025-04-15 | Stop reason: HOSPADM

## 2025-04-10 RX ORDER — ACETAMINOPHEN 160 MG/5ML
650 SOLUTION ORAL EVERY 4 HOURS PRN
Status: DISCONTINUED | OUTPATIENT
Start: 2025-04-10 | End: 2025-04-15 | Stop reason: HOSPADM

## 2025-04-10 RX ORDER — PANTOPRAZOLE SODIUM 40 MG/1
40 TABLET, DELAYED RELEASE ORAL
Status: DISCONTINUED | OUTPATIENT
Start: 2025-04-11 | End: 2025-04-15 | Stop reason: HOSPADM

## 2025-04-10 RX ORDER — SODIUM CHLORIDE 9 MG/ML
40 INJECTION, SOLUTION INTRAVENOUS AS NEEDED
Status: DISCONTINUED | OUTPATIENT
Start: 2025-04-10 | End: 2025-04-15 | Stop reason: HOSPADM

## 2025-04-10 RX ORDER — SODIUM CHLORIDE 0.9 % (FLUSH) 0.9 %
10 SYRINGE (ML) INJECTION AS NEEDED
Status: DISCONTINUED | OUTPATIENT
Start: 2025-04-10 | End: 2025-04-15 | Stop reason: HOSPADM

## 2025-04-10 RX ORDER — SODIUM CHLORIDE 9 MG/ML
100 INJECTION, SOLUTION INTRAVENOUS CONTINUOUS
Status: ACTIVE | OUTPATIENT
Start: 2025-04-10 | End: 2025-04-12

## 2025-04-10 RX ORDER — BUPROPION HYDROCHLORIDE 150 MG/1
150 TABLET ORAL DAILY
Status: DISCONTINUED | OUTPATIENT
Start: 2025-04-11 | End: 2025-04-15 | Stop reason: HOSPADM

## 2025-04-10 RX ORDER — ASPIRIN 81 MG/1
81 TABLET ORAL DAILY
Status: DISCONTINUED | OUTPATIENT
Start: 2025-04-11 | End: 2025-04-15 | Stop reason: HOSPADM

## 2025-04-10 RX ADMIN — SODIUM CHLORIDE 100 ML/HR: 9 INJECTION, SOLUTION INTRAVENOUS at 22:46

## 2025-04-10 RX ADMIN — Medication 10 ML: at 23:02

## 2025-04-10 RX ADMIN — ACETAMINOPHEN 650 MG: 325 TABLET, FILM COATED ORAL at 23:25

## 2025-04-10 RX ADMIN — Medication 5 MG: at 23:25

## 2025-04-10 NOTE — FSED PROVIDER NOTE
Subjective  History of Present Illness:    84-year-old female presents to the ED with complaints of pain in her right buttocks after fall.  States that yesterday, patient was bending over to pick something up from the ground and fell backwards.  Denies any head injury or LOC.  Landed on her right buttocks and now complaining of generalized ache.  Patient is able to ambulate, however does have pain.  Recently seen last week with knee injury.  Patient suffered partial tear of ACL and medial meniscus tear on her right knee.  Patient not on any blood thinners.  Denies any fever, chills, body aches, numbness, tingling.  Denies any other symptoms or concerns at this time.    Nurses Notes reviewed and agree, including vitals, allergies, social history and prior medical history.     REVIEW OF SYSTEMS: All systems reviewed and not pertinent unless noted.  Review of Systems   Musculoskeletal:  Positive for arthralgias and myalgias.   All other systems reviewed and are negative.      Past Medical History:   Diagnosis Date    Acid reflux     Anemia     Arthritis     Arthritis     Asthma     Breast injury     HIT LT BREAST WITH HANDLEBAR AS YOUNG CHILD    Depression     Diabetes     Disease of thyroid gland     History of stress test     20 plus years ago    Hyperlipidemia     Hypertension     Thyroid disorder        Allergies:    Bee venom, Dust mite extract, Prednisone, and Sulfa antibiotics      Past Surgical History:   Procedure Laterality Date    BREAST BIOPSY Right 2010    CATARACT EXTRACTION, BILATERAL      FOOT SURGERY Bilateral     HYSTERECTOMY      AGE 42    OOPHORECTOMY Bilateral     AGE 42         Social History     Socioeconomic History    Marital status:    Tobacco Use    Smoking status: Former     Current packs/day: 0.00     Average packs/day: 0.5 packs/day for 20.0 years (10.0 ttl pk-yrs)     Types: Cigarettes     Start date:      Quit date:      Years since quittin.2    Smokeless tobacco:  "Never   Vaping Use    Vaping status: Never Used   Substance and Sexual Activity    Alcohol use: No    Drug use: No    Sexual activity: Defer         Family History   Problem Relation Age of Onset    Thyroid disease Mother     Heart disease Father     Dementia Sister     Ovarian cancer Neg Hx     Breast cancer Neg Hx        Objective  Physical Exam:  /87   Pulse 67   Temp 97.8 °F (36.6 °C) (Oral)   Resp 18   Ht 144 cm (56.69\")   Wt 53.3 kg (117 lb 6.4 oz)   LMP  (LMP Unknown)   SpO2 93%   BMI 25.68 kg/m²      Physical Exam  Constitutional:       General: She is not in acute distress.     Appearance: Normal appearance. She is not ill-appearing.   HENT:      Head: Normocephalic and atraumatic.   Cardiovascular:      Rate and Rhythm: Normal rate and regular rhythm.      Pulses: Normal pulses.   Pulmonary:      Effort: Pulmonary effort is normal. No respiratory distress.      Breath sounds: Normal breath sounds.   Abdominal:      General: Abdomen is flat. Bowel sounds are normal.      Palpations: Abdomen is soft.   Musculoskeletal:         General: No swelling.      Cervical back: Normal range of motion and neck supple.      Right hip: Tenderness present. No deformity. Decreased range of motion (due to pain).        Legs:       Comments: TTP to circled area   Skin:     General: Skin is warm and dry.      Findings: No ecchymosis.   Neurological:      General: No focal deficit present.      Mental Status: She is alert and oriented to person, place, and time.   Psychiatric:         Mood and Affect: Mood normal.         Behavior: Behavior normal.       Procedures    ED Course:         Lab Results (last 24 hours)       ** No results found for the last 24 hours. **             CT Pelvis Without Contrast  Result Date: 4/10/2025  CT PELVIS WO CONTRAST Date of Exam: 4/10/2025 2:41 PM EDT Indication: Fall, right buttock pain. Comparison: CT pelvis performed on 7/9/2023. Technique: Axial CT images were obtained of the " pelvis without contrast administration.  Reconstructed coronal and sagittal images were also obtained. Automated exposure control and iterative construction methods were used. Findings: There is a nondisplaced fracture through the right inferior pubic ramus. There are no additional acute bony abnormalities. The patient's right hip arthroplasty is intact. There is narrowing, subchondral sclerosis, and spurring of the left hip joint, pubic symphysis, and sacroiliac joints indicating arthritic changes. There are degenerative changes within the lower lumbar spine. There are atherosclerotic vascular calcifications in the pelvis. There is increased stool burden. The uterus is surgically absent. There is no soft tissue hematoma.     Impression: Impression: 1.Nondisplaced fracture through the right inferior pubic ramus. 2.The patient's right hip arthroplasty is intact. 3.Degenerative changes. 4.Additional findings as noted above. Electronically Signed: Jose Rivera MD  4/10/2025 3:21 PM EDT  Workstation ID: [id number]       MDM     Amount and/or Complexity of Data Reviewed  Tests in the radiology section of CPT®: reviewed    Patient presented to the ED with complaints of pain in her right buttocks after fall yesterday.  Patient's CT showed a nondisplaced fracture of the right inferior pubic ramus.  Spoke with orthopedics - Dr. Mckinney who stated that this was nonoperable, however recommended patient be admitted for short-term rehab placement.  Spoke with Dr. Leigh-hospitalist at [facility] and she excepted patient.  At this time, waiting for patient to be transferred to [facility].    Medications - No data to display  -----  ED Disposition       ED Disposition   Decision to Admit    Condition   --    Comment   Level of Care: Telemetry [5]   Accepting Provider:: ROXANA LEIGH [[id number]]               Final diagnoses:   Closed fracture of right inferior pubic ramus, initial encounter     Your Follow-Up Providers     Follow-up information has not been specified.       Contact information for after-discharge care    Follow-up information has not been specified.          Your medication list        CONTINUE taking these medications        Instructions Last Dose Given Next Dose Due   armodafinil 150 MG tablet  Commonly known as: Nuvigil      Take 1 tablet by mouth Daily.       aspirin 81 MG EC tablet      Take 1 tablet by mouth Daily.       atorvastatin 40 MG tablet  Commonly known as: LIPITOR      1 tablet Daily.       buPROPion  MG 24 hr tablet  Commonly known as: WELLBUTRIN XL      1 tablet Daily.       Calcium 250 MG capsule      Daily.       FLUoxetine 20 MG capsule  Commonly known as: PROzac      Take 1 capsule by mouth Daily.       IRON (FERROUS GLUCONATE) PO      Take  by mouth.       levothyroxine 88 MCG tablet  Commonly known as: SYNTHROID, LEVOTHROID      100 mcg Daily.       losartan 100 MG tablet  Commonly known as: COZAAR      Take 1 tablet by mouth Daily.       metFORMIN 500 MG tablet  Commonly known as: GLUCOPHAGE      Take 1 tablet by mouth 2 (Two) Times a Day With Meals.       omeprazole 40 MG capsule  Commonly known as: priLOSEC      Take 1 capsule by mouth 2 (Two) Times a Day.       Ventolin  (90 Base) MCG/ACT inhaler  Generic drug: albuterol sulfate HFA      As Needed.       vitamin B-12 1000 MCG tablet  Commonly known as: CYANOCOBALAMIN      Take 1 tablet by mouth Daily.

## 2025-04-10 NOTE — ED NOTES
Mary Irving    Nursing Report ED to Floor:  Mental status: aox4  Ambulatory status: assist  Oxygen Therapy:  room air   Cardiac Rhythm: nsr  Admitted from: [facility]  Safety Concerns:  fall risk  Precautions: n/a  Social Issues: n/a  ED Room #:  09    ED Nurse Phone Extension - [phone] or may call [phone].      HPI:   Chief Complaint   Patient presents with    Fall       Past Medical History:  Past Medical History:   Diagnosis Date    Acid reflux     Anemia     Arthritis     Arthritis     Asthma     Breast injury     HIT LT BREAST WITH HANDLEBAR AS YOUNG CHILD    Depression     Diabetes     Disease of thyroid gland     History of stress test     20 plus years ago    Hyperlipidemia     Hypertension     Thyroid disorder         Past Surgical History:  Past Surgical History:   Procedure Laterality Date    BREAST BIOPSY Right 2010    CATARACT EXTRACTION, BILATERAL      FOOT SURGERY Bilateral     HYSTERECTOMY      AGE 42    OOPHORECTOMY Bilateral     AGE 42        Admitting Doctor:   No admitting provider for patient encounter.    Consulting Provider(s):  Consults       Date and Time Order Name Status Description    4/5/2025  9:24 PM Inpatient Orthopedic Surgery Consult Completed              Admitting Diagnosis:   The encounter diagnosis was Closed fracture of right inferior pubic ramus, initial encounter.    Most Recent Vitals:   Vitals:    04/10/25 1700 04/10/25 1730 04/10/25 1800 04/10/25 1828   BP: 152/75 146/79 153/80 160/87   BP Location:       Patient Position:       Pulse: 66 66 73 67   Resp:       Temp:       TempSrc:       SpO2: 92% 91% 90% 93%   Weight:       Height:           Active LDAs/IV Access:   Lines, Drains & Airways       Active LDAs       Name Placement date Placement time Site Days    Peripheral IV 04/10/25 1932 Anterior;Proximal;Right Forearm 04/10/25  1932  Forearm  less than 1    External Urinary Catheter 04/10/25  1605  --  less than 1                    Labs (abnormal labs have a star):    Labs Reviewed   COMPREHENSIVE METABOLIC PANEL   CBC WITH AUTO DIFFERENTIAL   CBC AND DIFFERENTIAL    Narrative:     The following orders were created for panel order CBC & Differential.  Procedure                               Abnormality         Status                     ---------                               -----------         ------                     CBC Auto Differential[[id number]]                                                         Please view results for these tests on the individual orders.       Meds Given in ED:   Medications - No data to display  No current facility-administered medications for this encounter.       Last NIH score:                                                          Dysphagia screening results:  Patient Factors Component (Dysphagia:Stroke or Rule-out)  Best Eye Response: 4-->(E4) spontaneous (04/10/25 1320)  Best Motor Response: 6-->(M6) obeys commands (04/10/25 1320)  Best Verbal Response: 5-->(V5) oriented (04/10/25 1320)  [place] Coma Scale Score: 15 (04/10/25 1320)     [place] Coma Scale:  No data recorded     CIWA:        Restraint Type:            Isolation Status:  No active isolations

## 2025-04-11 PROBLEM — S32.509A CLOSED NONDISPLACED FRACTURE OF PUBIS: Status: ACTIVE | Noted: 2025-04-10

## 2025-04-11 PROBLEM — S32.599A PUBIC RAMUS FRACTURE: Status: ACTIVE | Noted: 2025-04-11

## 2025-04-11 LAB
ANION GAP SERPL CALCULATED.3IONS-SCNC: 10 MMOL/L (ref 5–15)
BACTERIA SPEC ANAEROBE CULT: NORMAL
BASOPHILS # BLD AUTO: 0.02 10*3/MM3 (ref 0–0.2)
BASOPHILS NFR BLD AUTO: 0.2 % (ref 0–1.5)
BUN SERPL-MCNC: 13 MG/DL (ref 8–23)
BUN/CREAT SERPL: 22 (ref 7–25)
CALCIUM SPEC-SCNC: 8.6 MG/DL (ref 8.6–10.5)
CHLORIDE SERPL-SCNC: 105 MMOL/L (ref 98–107)
CO2 SERPL-SCNC: 23 MMOL/L (ref 22–29)
CREAT SERPL-MCNC: 0.59 MG/DL (ref 0.57–1)
DEPRECATED RDW RBC AUTO: 42.6 FL (ref 37–54)
EGFRCR SERPLBLD CKD-EPI 2021: 90.1 ML/MIN/1.73
EOSINOPHIL # BLD AUTO: 0.11 10*3/MM3 (ref 0–0.4)
EOSINOPHIL NFR BLD AUTO: 1.3 % (ref 0.3–6.2)
ERYTHROCYTE [DISTWIDTH] IN BLOOD BY AUTOMATED COUNT: 12.2 % (ref 12.3–15.4)
GLUCOSE BLDC GLUCOMTR-MCNC: 137 MG/DL (ref 70–130)
GLUCOSE BLDC GLUCOMTR-MCNC: 188 MG/DL (ref 70–130)
GLUCOSE BLDC GLUCOMTR-MCNC: 212 MG/DL (ref 70–130)
GLUCOSE SERPL-MCNC: 147 MG/DL (ref 65–99)
HCT VFR BLD AUTO: 41.5 % (ref 34–46.6)
HGB BLD-MCNC: 13.4 G/DL (ref 12–15.9)
IMM GRANULOCYTES # BLD AUTO: 0.05 10*3/MM3 (ref 0–0.05)
IMM GRANULOCYTES NFR BLD AUTO: 0.6 % (ref 0–0.5)
LYMPHOCYTES # BLD AUTO: 1.33 10*3/MM3 (ref 0.7–3.1)
LYMPHOCYTES NFR BLD AUTO: 15.6 % (ref 19.6–45.3)
MCH RBC QN AUTO: 30.9 PG (ref 26.6–33)
MCHC RBC AUTO-ENTMCNC: 32.3 G/DL (ref 31.5–35.7)
MCV RBC AUTO: 95.6 FL (ref 79–97)
MONOCYTES # BLD AUTO: 0.84 10*3/MM3 (ref 0.1–0.9)
MONOCYTES NFR BLD AUTO: 9.9 % (ref 5–12)
NEUTROPHILS NFR BLD AUTO: 6.17 10*3/MM3 (ref 1.7–7)
NEUTROPHILS NFR BLD AUTO: 72.4 % (ref 42.7–76)
NRBC BLD AUTO-RTO: 0 /100 WBC (ref 0–0.2)
PLATELET # BLD AUTO: 324 10*3/MM3 (ref 140–450)
PMV BLD AUTO: 9.8 FL (ref 6–12)
POTASSIUM SERPL-SCNC: 4.1 MMOL/L (ref 3.5–5.2)
RBC # BLD AUTO: 4.34 10*6/MM3 (ref 3.77–5.28)
SODIUM SERPL-SCNC: 138 MMOL/L (ref 136–145)
T4 FREE SERPL-MCNC: 1.18 NG/DL (ref 0.92–1.68)
TSH SERPL DL<=0.05 MIU/L-ACNC: 9.54 UIU/ML (ref 0.27–4.2)
WBC NRBC COR # BLD AUTO: 8.52 10*3/MM3 (ref 3.4–10.8)

## 2025-04-11 PROCEDURE — 99232 SBSQ HOSP IP/OBS MODERATE 35: CPT | Performed by: INTERNAL MEDICINE

## 2025-04-11 PROCEDURE — 84443 ASSAY THYROID STIM HORMONE: CPT | Performed by: PHYSICIAN ASSISTANT

## 2025-04-11 PROCEDURE — 25810000003 SODIUM CHLORIDE 0.9 % SOLUTION: Performed by: PHYSICIAN ASSISTANT

## 2025-04-11 PROCEDURE — G0378 HOSPITAL OBSERVATION PER HR: HCPCS

## 2025-04-11 PROCEDURE — 85025 COMPLETE CBC W/AUTO DIFF WBC: CPT | Performed by: PHYSICIAN ASSISTANT

## 2025-04-11 PROCEDURE — 63710000001 INSULIN LISPRO (HUMAN) PER 5 UNITS: Performed by: INTERNAL MEDICINE

## 2025-04-11 PROCEDURE — 84439 ASSAY OF FREE THYROXINE: CPT | Performed by: PHYSICIAN ASSISTANT

## 2025-04-11 PROCEDURE — 82948 REAGENT STRIP/BLOOD GLUCOSE: CPT

## 2025-04-11 PROCEDURE — 80048 BASIC METABOLIC PNL TOTAL CA: CPT | Performed by: PHYSICIAN ASSISTANT

## 2025-04-11 RX ORDER — DEXTROSE MONOHYDRATE 25 G/50ML
25 INJECTION, SOLUTION INTRAVENOUS
Status: DISCONTINUED | OUTPATIENT
Start: 2025-04-11 | End: 2025-04-15 | Stop reason: HOSPADM

## 2025-04-11 RX ORDER — INSULIN LISPRO 100 [IU]/ML
2-7 INJECTION, SOLUTION INTRAVENOUS; SUBCUTANEOUS
Status: DISCONTINUED | OUTPATIENT
Start: 2025-04-11 | End: 2025-04-15 | Stop reason: HOSPADM

## 2025-04-11 RX ORDER — NICOTINE POLACRILEX 4 MG
15 LOZENGE BUCCAL
Status: DISCONTINUED | OUTPATIENT
Start: 2025-04-11 | End: 2025-04-15 | Stop reason: HOSPADM

## 2025-04-11 RX ORDER — IBUPROFEN 600 MG/1
1 TABLET ORAL
Status: DISCONTINUED | OUTPATIENT
Start: 2025-04-11 | End: 2025-04-15 | Stop reason: HOSPADM

## 2025-04-11 RX ADMIN — LEVOTHYROXINE SODIUM 100 MCG: 0.1 TABLET ORAL at 06:45

## 2025-04-11 RX ADMIN — Medication 10 ML: at 08:12

## 2025-04-11 RX ADMIN — PANTOPRAZOLE SODIUM 40 MG: 40 TABLET, DELAYED RELEASE ORAL at 06:45

## 2025-04-11 RX ADMIN — ACETAMINOPHEN 650 MG: 325 TABLET, FILM COATED ORAL at 18:09

## 2025-04-11 RX ADMIN — BUPROPION HYDROCHLORIDE 150 MG: 150 TABLET, EXTENDED RELEASE ORAL at 08:10

## 2025-04-11 RX ADMIN — ATORVASTATIN CALCIUM 40 MG: 40 TABLET, FILM COATED ORAL at 08:10

## 2025-04-11 RX ADMIN — INSULIN LISPRO 2 UNITS: 100 INJECTION, SOLUTION INTRAVENOUS; SUBCUTANEOUS at 13:11

## 2025-04-11 RX ADMIN — ACETAMINOPHEN 650 MG: 325 TABLET, FILM COATED ORAL at 22:34

## 2025-04-11 RX ADMIN — Medication 10 ML: at 21:27

## 2025-04-11 RX ADMIN — INSULIN LISPRO 3 UNITS: 100 INJECTION, SOLUTION INTRAVENOUS; SUBCUTANEOUS at 21:28

## 2025-04-11 RX ADMIN — ACETAMINOPHEN 650 MG: 325 TABLET, FILM COATED ORAL at 08:37

## 2025-04-11 RX ADMIN — LOSARTAN POTASSIUM 100 MG: 50 TABLET, FILM COATED ORAL at 08:10

## 2025-04-11 RX ADMIN — FLUOXETINE HYDROCHLORIDE 20 MG: 20 CAPSULE ORAL at 08:10

## 2025-04-11 RX ADMIN — ACETAMINOPHEN 650 MG: 325 TABLET, FILM COATED ORAL at 12:14

## 2025-04-11 RX ADMIN — Medication 5 MG: at 22:34

## 2025-04-11 RX ADMIN — SODIUM CHLORIDE 100 ML/HR: 9 INJECTION, SOLUTION INTRAVENOUS at 06:47

## 2025-04-11 RX ADMIN — ASPIRIN 81 MG: 81 TABLET, COATED ORAL at 08:10

## 2025-04-11 NOTE — CASE MANAGEMENT/SOCIAL WORK
Continued Stay Note  [facility]     Patient Name: Mary Irving  MRN: [id number]  Today's Date: 4/11/2025    Admit Date: 4/10/2025    Plan: home   Discharge Plan       Row Name 04/11/25 1154       Plan    Plan home    Patient/Family in Agreement with Plan yes    Plan Comments Met with patient at the bedside to initiate discharge planning. Patient lives alone in a house in [facility]. At baseline, patient is independent with ADLs and doesn't require any DME to assist with mobility. Patient has a walker, cane, and CPAP at home. Patient denies any home oxygen use. Patient believes she is current with home health services but is unsure of which company. Patient has Medicare and [employer] insurance with prescription benefits and prefers to fill scripts at the [facility]. Patient's goal is home at discharge. Patient is unsure if she will have a ride. PT and OT have been ordered. CM will continue to follow patient and assist with discharge planning as recommendations become available.    Final Discharge Disposition Code 06 - home with home health care                   Discharge Codes    No documentation.                       Pierre Bobo RN

## 2025-04-11 NOTE — H&P
[facility] Medicine Services  HISTORY AND PHYSICAL    Patient Name: Mary Irving  : 1940  MRN: [id number]  Primary Care Physician: Rosa Isela Bangura MD  Date of admission: 4/10/2025    Subjective   Subjective     Chief Complaint:  Fall, right hip pain    HPI:  Mary Irving is a 84 y.o. female with a history of HTN, HLD, Hypothyroidism, anxiety/depression, and recent dx of right ACL tear who presented to [facility] ED after suffering a fall at home. She reports that she was walking through her home, and bent over to grab something, and unfortunately lost her balance causing her to collapse to the ground. She denies head injury or LoC. She was able to get herself off the ground, but had significant right hip pain throughout the day, which prompted the ED visit this evening. She denies fever, chills, chest pain, SOB, cough, nausea or vomiting.         Review of Systems   Constitutional:  Negative for chills, fatigue, fever and unexpected weight change.   HENT:  Negative for nosebleeds, sore throat and trouble swallowing.    Eyes:  Negative for photophobia and visual disturbance.   Respiratory:  Negative for cough, shortness of breath and wheezing.    Cardiovascular:  Negative for chest pain and palpitations.   Gastrointestinal:  Negative for abdominal pain, diarrhea, nausea and vomiting.   Genitourinary:  Negative for dysuria and hematuria.   Musculoskeletal:  Positive for arthralgias.   Skin: Negative.    Neurological:  Negative for tremors, syncope, speech difficulty and weakness.   Psychiatric/Behavioral:  Negative for confusion. The patient is not nervous/anxious.               Personal History     Past Medical History:   Diagnosis Date    Acid reflux     Anemia     Arthritis     Arthritis     Asthma     Breast injury     HIT LT BREAST WITH HANDLEBAR AS YOUNG CHILD    Depression     Diabetes     Disease of thyroid gland     History of stress test     20 plus years  ago    Hyperlipidemia     Hypertension     Thyroid disorder              Past Surgical History:   Procedure Laterality Date    BREAST BIOPSY Right 2010    CATARACT EXTRACTION, BILATERAL      FOOT SURGERY Bilateral     HYSTERECTOMY      AGE 42    OOPHORECTOMY Bilateral     AGE 42       Family History:  family history includes Dementia in her sister; Heart disease in her father; Thyroid disease in her mother.     Social History:  reports that she quit smoking about 29 years ago. Her smoking use included cigarettes. She started smoking about 49 years ago. She has a 10 pack-year smoking history. She has never used smokeless tobacco. She reports that she does not drink alcohol and does not use drugs.  Social History     Social History Narrative    Not on file       Medications:  Calcium, FLUoxetine, Ferrous Gluconate, albuterol sulfate HFA, armodafinil, aspirin, atorvastatin, buPROPion XL, levothyroxine, losartan, metFORMIN, omeprazole, and vitamin B-12    Allergies   Allergen Reactions    Bee Venom Unknown - Low Severity    Dust Mite Extract Unknown - Low Severity    Prednisone Unknown - Low Severity    Sulfa Antibiotics Unknown - High Severity and Unknown - Low Severity       Objective   Objective     Vital Signs:   Temp:  [97.8 °F (36.6 °C)-98.2 °F (36.8 °C)] 98.1 °F (36.7 °C)  Heart Rate:  [64-74] 73  Resp:  [18-20] 18  BP: (146-160)/(65-89) 150/89    Physical Exam  Constitutional:       General: She is not in acute distress.     Appearance: Normal appearance.   HENT:      Head: Atraumatic.      Right Ear: External ear normal.      Left Ear: External ear normal.      Nose: Nose normal.   Eyes:      Extraocular Movements: Extraocular movements intact.      Conjunctiva/sclera: Conjunctivae normal.      Pupils: Pupils are equal, round, and reactive to light.   Cardiovascular:      Rate and Rhythm: Normal rate and regular rhythm.      Pulses: Normal pulses.      Heart sounds: Normal heart sounds. No murmur  heard.  Pulmonary:      Effort: Pulmonary effort is normal. No respiratory distress.      Breath sounds: Normal breath sounds. No wheezing, rhonchi or rales.   Abdominal:      General: Bowel sounds are normal. There is no distension.      Tenderness: There is no abdominal tenderness. There is no guarding or rebound.   Musculoskeletal:      Cervical back: No rigidity.      Right lower leg: No edema.      Left lower leg: No edema.      Comments: Pain with ROM at right hip   Skin:     General: Skin is warm and dry.      Coloration: Skin is not jaundiced.      Findings: No lesion or rash.   Neurological:      General: No focal deficit present.      Mental Status: She is alert and oriented to person, place, and time.   Psychiatric:         Attention and Perception: Attention normal.         Mood and Affect: Mood normal.         Behavior: Behavior normal.         Thought Content: Thought content normal.            Result Review:  I have personally reviewed the results from the time of this admission to 4/10/2025 22:32 EDT and agree with these findings:  [x]  Laboratory list / accordion  []  Microbiology  [x]  Radiology  [x]  EKG/Telemetry   []  Cardiology/Vascular   []  Pathology  [x]  Old records  []  Other:      LAB RESULTS:      Lab 04/10/25  1931 04/06/25  1023 04/05/25  2009 04/05/25  1808   WBC 8.90 7.16  --  10.13   HEMOGLOBIN 15.0 13.0  --  13.4   HEMATOCRIT 45.1 40.2  --  40.1   PLATELETS 364 357  --  384   NEUTROS ABS 6.51 5.82  --  7.96*   IMMATURE GRANS (ABS) 0.02 0.01  --  0.03   LYMPHS ABS 1.52 0.59*  --  0.98   MONOS ABS 0.81 0.64  --  1.10*   EOS ABS 0.04 0.08  --  0.02   MCV 93.8 96.2  --  93.5   SED RATE  --   --  44*  --    CRP  --   --  8.66*  --          Lab 04/10/25  1931 04/06/25  1023 04/05/25  2009 04/05/25  1808   SODIUM 139 130* 130*  --    POTASSIUM 4.4 3.7 3.8  --    CHLORIDE 102 96* 94*  --    CO2 24.7 22.0 25.0  --    ANION GAP 12.3 12.0 11.0  --    BUN 12 11 10  --    CREATININE 0.49* 0.71  0.57  --    EGFR 94.2 85.0 90.9  --    GLUCOSE 112* 303* 230*  --    CALCIUM 9.7 8.8 9.2  --    HEMOGLOBIN A1C  --   --   --  6.70*   TSH  --   --  3.190  --          Lab 04/10/25  1931 04/06/25  1023 04/05/25 2009   TOTAL PROTEIN 7.2 6.6 6.7   ALBUMIN 4.0 3.7 3.9   GLOBULIN 3.2 2.9 2.8   ALT (SGPT) 16 14 11   AST (SGOT) 21 16 17   BILIRUBIN 0.5 0.6 0.7   ALK PHOS 177* 165* 174*                     Brief Urine Lab Results  (Last result in the past 365 days)        Color   Clarity   Blood   Leuk Est   Nitrite   Protein   CREAT   Urine HCG        04/05/25 1109 Yellow   Clear   Negative   Negative   Negative   Negative                 Microbiology Results (last 10 days)       Procedure Component Value - Date/Time    Body Fluid Culture - Body Fluid, Knee, Right [[id number]] Collected: 04/06/25 0902    Lab Status: Preliminary result Specimen: Body Fluid from Knee, Right Updated: 04/10/25 0646     Body Fluid Culture No growth at 4 days     Gram Stain Few (2+) WBCs seen      No organisms seen    Anaerobic Culture - Body Fluid, Knee, Right [[id number]]  (Normal) Collected: 04/06/25 0902    Lab Status: Preliminary result Specimen: Body Fluid from Knee, Right Updated: 04/09/25 1031     Anaerobic Culture No anaerobes isolated at 3 days    AFB Culture - Body Fluid, Knee, Right [[id number]] Collected: 04/06/25 0902    Lab Status: Preliminary result Specimen: Body Fluid from Knee, Right Updated: 04/07/25 1224     AFB Stain No acid fast bacilli seen on concentrated smear            CT Pelvis Without Contrast  Result Date: 4/10/2025  CT PELVIS WO CONTRAST Date of Exam: 4/10/2025 2:41 PM EDT Indication: Fall, right buttock pain. Comparison: CT pelvis performed on 7/9/2023. Technique: Axial CT images were obtained of the pelvis without contrast administration.  Reconstructed coronal and sagittal images were also obtained. Automated exposure control and iterative construction methods were used. Findings: There is a nondisplaced fracture  through the right inferior pubic ramus. There are no additional acute bony abnormalities. The patient's right hip arthroplasty is intact. There is narrowing, subchondral sclerosis, and spurring of the left hip joint, pubic symphysis, and sacroiliac joints indicating arthritic changes. There are degenerative changes within the lower lumbar spine. There are atherosclerotic vascular calcifications in the pelvis. There is increased stool burden. The uterus is surgically absent. There is no soft tissue hematoma.     Impression: Impression: 1.Nondisplaced fracture through the right inferior pubic ramus. 2.The patient's right hip arthroplasty is intact. 3.Degenerative changes. 4.Additional findings as noted above. Electronically Signed: Jose Rivera MD  4/10/2025 3:21 PM EDT  Workstation ID: [id number]      Results for orders placed during the hospital encounter of 11/10/23    Adult Transthoracic Echo Complete W/ Cont if Necessary Per Protocol    Interpretation Summary    Left ventricular ejection fraction appears to be greater than 70%.  grade 1 diastolic dysfunction    mild aortic valve insuficiency    Estimated right ventricular systolic pressure from tricuspid regurgitation is normal (<35 mmHg).      Assessment & Plan   Assessment & Plan       Closed right hip fracture    Primary hypertension    Mixed hyperlipidemia    Hypothyroidism (acquired)      Mary Irving is a 84 y.o. female with a history of HTN, HLD, Hypothyroidism, anxiety/depression, and recent dx of right ACL tear who presented to [facility] ED after suffering a fall at home.     Fall at home  Right Hip Fracture  Recent Hx R ACL Tear  -CT pelvis tonight shows a nondisplaced fracture through the right inferior pubic ramus.  -Ortho Surg made aware of case. Voiced no recommendation for surgical intervention at this time.   -Pain control  -PT/OT    HTN  HLD  -Takes Losartan. Continue  -Continue statin    Hypothyroidism  -Continue home synthroid  -Check  TSH, T4             DVT prophylaxis:  scds    CODE STATUS:  DNR/DNI  Code Status (Patient has no pulse and is not breathing): No CPR (Do Not Attempt to Resuscitate)  Medical Interventions (Patient has pulse or is breathing): Limited Support  Medical Intervention Limits: No intubation (DNI)      Expected DischargeTBD     This note has been completed as part of a split-shared workflow.     Signature: Electronically signed by Lauren Camarena PA-C, 04/10/25, 10:26 PM EDT

## 2025-04-11 NOTE — CONSULTS
Orthopedic Consult      Patient: Mary Irving    Date of Admission: 4/10/2025  1:13 PM    YOB: 1940    Medical Record Number: [id number]    Attending Physician: Tejas Camacho DO    Consulting Physician: Heraclio Peralta MD      Chief Complaints: Pubic ramus fracture [S32.379K]      History of Present Illness: 84 y.o. female admitted to [facility] with Pubic ramus fracture [S32.127U]. I was consulted for further evaluation and treatment of pubic ramus fracture. Onset of symptoms was abrupt starting 2 days ago.  Symptoms are associated with pain.  Symptoms are aggravated by movement and weight-bearing.   Symptoms improve with rest.  She was in her usual state of health living independently by herself when she sustained a fall landing on her bottom 2 days ago.  She has been ambulating at home but with significant pain and presented to the emergency room last evening and was diagnosed with the pubic ramus fracture.  She denies any other current extremity complaints.       Allergies   Allergen Reactions    Bee Venom Unknown - Low Severity    Dust Mite Extract Unknown - Low Severity    Prednisone Unknown - Low Severity    Sulfa Antibiotics Unknown - High Severity and Unknown - Low Severity        Home Medications:  Medications Prior to Admission   Medication Sig Dispense Refill Last Dose/Taking    armodafinil (Nuvigil) 150 MG tablet Take 1 tablet by mouth Daily. 30 tablet 2     aspirin 81 MG EC tablet Take 1 tablet by mouth Daily.       atorvastatin (LIPITOR) 40 MG tablet 1 tablet Daily.       buPROPion XL (WELLBUTRIN XL) 150 MG 24 hr tablet 1 tablet Daily.       Calcium 250 MG capsule Daily.       FLUoxetine (PROzac) 20 MG capsule Take 1 capsule by mouth Daily.       IRON, FERROUS GLUCONATE, PO Take  by mouth.       levothyroxine (SYNTHROID, LEVOTHROID) 88 MCG tablet 100 mcg Daily.       losartan (COZAAR) 100 MG tablet Take 1 tablet by mouth Daily. 30 tablet 0     metFORMIN  (GLUCOPHAGE) 500 MG tablet Take 1 tablet by mouth 2 (Two) Times a Day With Meals.       omeprazole (priLOSEC) 40 MG capsule Take 1 capsule by mouth 2 (Two) Times a Day.       VENTOLIN  (90 BASE) MCG/ACT inhaler As Needed.       vitamin B-12 (CYANOCOBALAMIN) 1000 MCG tablet Take 1 tablet by mouth Daily.            Past Medical History:   Diagnosis Date    Acid reflux     Anemia     Arthritis     Arthritis     Asthma     Breast injury     HIT LT BREAST WITH HANDLEBAR AS YOUNG CHILD    Depression     Diabetes     Disease of thyroid gland     History of stress test     20 plus years ago    Hyperlipidemia     Hypertension     Thyroid disorder         Past Surgical History:   Procedure Laterality Date    BREAST BIOPSY Right 2010    CATARACT EXTRACTION, BILATERAL      FOOT SURGERY Bilateral     HYSTERECTOMY      AGE 42    OOPHORECTOMY Bilateral     AGE 42        Social History     Occupational History    Not on file   Tobacco Use    Smoking status: Former     Current packs/day: 0.00     Average packs/day: 0.5 packs/day for 20.0 years (10.0 ttl pk-yrs)     Types: Cigarettes     Start date:      Quit date:      Years since quittin.2    Smokeless tobacco: Never   Vaping Use    Vaping status: Never Used   Substance and Sexual Activity    Alcohol use: No    Drug use: No    Sexual activity: Defer      Social History     Social History Narrative    Not on file        Family History   Problem Relation Age of Onset    Thyroid disease Mother     Heart disease Father     Dementia Sister     Ovarian cancer Neg Hx     Breast cancer Neg Hx          Review of Systems:   As per the H/P which I have reviewed    Physical Exam: 84 y.o. female  General Appearance:    Alert, cooperative, in no acute distress                   Vitals:    25 0648 25 0810 25 1047 25 1608   BP: 167/92 147/91 151/87 133/80   BP Location: Left arm  Left arm Left arm   Patient Position: Lying  Lying Lying   Pulse: 60 66 71  70   Resp: 18  18 18   Temp: 97.4 °F (36.3 °C)  96.7 °F (35.9 °C) 97.1 °F (36.2 °C)   TempSrc: Oral  Axillary Axillary   SpO2: 96%  93% 96%   Weight:       Height:            Head:    Normocephalic, without obvious abnormality, atraumatic      Right Upper Extremity:  No obvious deformity, painless ROM shoulder, elbow, wrist, but shoulder range of motion very limited with pseudoparalysis which she states is baseline; no joint instability, normal distal strength and sensation to light touch, skin intact without cyanosis, clubbing, edema; +2 radial pulse  Left Upper Extremity:  No obvious deformity, painless ROM shoulder, elbow, wrist, no joint instability, normal distal strength and sensation to light touch, skin intact without cyanosis, clubbing, edema; +2 radial pulse  Right Lower Extremity: Mild irritability to hip range of motion but able to straight leg raise; no obvious deformity, painless ROM knee, ankle, compartments soft, normal distal strength and sensation to light touch, skin intact without cyanosis, clubbing, edema; +2 dorsalis pedis pulse  Left Lower Extremity:  No obvious deformity, painless ROM hip, knee, ankle, compartments soft, normal distal strength and sensation to light touch, skin intact without cyanosis, clubbing, edema; +2 dorsalis pedis pulse         Diagnostic Tests:    I have reviewed the labs, radiology results and diagnostic studies: Yes    Results from last 7 days   Lab Units 04/11/25  0420   WBC 10*3/mm3 8.52   HEMOGLOBIN g/dL 13.4   PLATELETS 10*3/mm3 324     Results from last 7 days   Lab Units 04/11/25  0419   SODIUM mmol/L 138   POTASSIUM mmol/L 4.1   CO2 mmol/L 23.0   CREATININE mg/dL 0.59   GLUCOSE mg/dL 147*       CT scan pelvis: I have personally viewed and interpreted which shows a right nondisplaced inferior pubic ramus fracture and possible subtle right superior pubic ramus fracture.  Well-positioned right total hip arthroplasty is present without periprosthetic fracture and  no evidence of loosening.    Assessment: Right inferior pubic ramus fracture  Patient Active Problem List   Diagnosis    Severe obstructive sleep apnea    Primary hypertension    Anxiety associated with depression    Dyslipidemia    Hypothyroidism (acquired)    Degenerative arthritis    Fatigue    Obesity (BMI 30-39.9)    Mild intermittent asthma    Weakness of both lower extremities    Right knee pain    Mixed hyperlipidemia    Primary osteoarthritis of right knee    Fatigue    Acute torn meniscus    Hyponatremia    Closed nondisplaced fracture of pubis    Pubic ramus fracture           Plan:  I discussed with the patient the findings. This does not require surgical intervention. Unfortunately it does just take time. She can weight-bear as tolerated. It is recommended that she use a walker. We will consult physical and Occupational Therapy. It is likely she will require inpatient rehabilitation. Would recommend DVT prophylaxis for approximately 3 to 4 weeks. Routine follow-up not required. Please call if I can be of further assistance.             Heraclio Peralta MD  04/11/25  16:35 EDT

## 2025-04-11 NOTE — PROGRESS NOTES
[facility] Medicine Services  PROGRESS NOTE    Patient Name: Mary Irving  : 1940  MRN: [id number]    Date of Admission: 4/10/2025  Primary Care Physician: Rosa Isela Bangura MD    Subjective   Subjective     CC:  F/u pelvic Fx    HPI:  Feels like she urgently needs to have a BM. Pain is tolerable if laying still      Objective   Objective     Vital Signs:   Temp:  [96.7 °F (35.9 °C)-98.2 °F (36.8 °C)] 96.7 °F (35.9 °C)  Heart Rate:  [59-74] 71  Resp:  [18-20] 18  BP: (146-167)/(65-92) 151/87     Physical Exam:  Constitutional: Awake, alert, sitting up in bed in NAD  Respiratory: Clear to auscultation bilaterally, respiratory effort normal   Cardiovascular: RRR, palpable pedal pulses  Gastrointestinal: Positive bowel sounds, soft, nontender, nondistended  Musculoskeletal: No lower extremity edema  Psychiatric: Appropriate affect, cooperative  Neurologic: Speech clear and fluent    Results Reviewed:  LAB RESULTS:      Lab 04/11/25  0420 04/10/25  1931 04/06/25  1023 04/05/25  2009 04/05/25  1808   WBC 8.52 8.90 7.16  --  10.13   HEMOGLOBIN 13.4 15.0 13.0  --  13.4   HEMATOCRIT 41.5 45.1 40.2  --  40.1   PLATELETS 324 364 357  --  384   NEUTROS ABS 6.17 6.51 5.82  --  7.96*   IMMATURE GRANS (ABS) 0.05 0.02 0.01  --  0.03   LYMPHS ABS 1.33 1.52 0.59*  --  0.98   MONOS ABS 0.84 0.81 0.64  --  1.10*   EOS ABS 0.11 0.04 0.08  --  0.02   MCV 95.6 93.8 96.2  --  93.5   SED RATE  --   --   --  44*  --    CRP  --   --   --  8.66*  --          Lab 04/11/25  0419 04/10/25  1931 04/06/25  1023 04/05/25  2009 04/05/25  1808   SODIUM 138 139 130* 130*  --    POTASSIUM 4.1 4.4 3.7 3.8  --    CHLORIDE 105 102 96* 94*  --    CO2 23.0 24.7 22.0 25.0  --    ANION GAP 10.0 12.3 12.0 11.0  --    BUN 13 12 11 10  --    CREATININE 0.59 0.49* 0.71 0.57  --    EGFR 90.1 94.2 85.0 90.9  --    GLUCOSE 147* 112* 303* 230*  --    CALCIUM 8.6 9.7 8.8 9.2  --    HEMOGLOBIN A1C  --   --   --   --  6.70*    TSH 9.540*  --   --  3.190  --          Lab 04/10/25  1931 04/06/25  1023 04/05/25 2009   TOTAL PROTEIN 7.2 6.6 6.7   ALBUMIN 4.0 3.7 3.9   GLOBULIN 3.2 2.9 2.8   ALT (SGPT) 16 14 11   AST (SGOT) 21 16 17   BILIRUBIN 0.5 0.6 0.7   ALK PHOS 177* 165* 174*                     Brief Urine Lab Results  (Last result in the past 365 days)        Color   Clarity   Blood   Leuk Est   Nitrite   Protein   CREAT   Urine HCG        04/05/25 1109 Yellow   Clear   Negative   Negative   Negative   Negative                   Microbiology Results Abnormal       None            CT Pelvis Without Contrast  Result Date: 4/10/2025  CT PELVIS WO CONTRAST Date of Exam: 4/10/2025 2:41 PM EDT Indication: Fall, right buttock pain. Comparison: CT pelvis performed on 7/9/2023. Technique: Axial CT images were obtained of the pelvis without contrast administration.  Reconstructed coronal and sagittal images were also obtained. Automated exposure control and iterative construction methods were used. Findings: There is a nondisplaced fracture through the right inferior pubic ramus. There are no additional acute bony abnormalities. The patient's right hip arthroplasty is intact. There is narrowing, subchondral sclerosis, and spurring of the left hip joint, pubic symphysis, and sacroiliac joints indicating arthritic changes. There are degenerative changes within the lower lumbar spine. There are atherosclerotic vascular calcifications in the pelvis. There is increased stool burden. The uterus is surgically absent. There is no soft tissue hematoma.     Impression: Impression: 1.Nondisplaced fracture through the right inferior pubic ramus. 2.The patient's right hip arthroplasty is intact. 3.Degenerative changes. 4.Additional findings as noted above. Electronically Signed: Jose Rivera MD  4/10/2025 3:21 PM EDT  Workstation ID: [id number]      Results for orders placed during the hospital encounter of 11/10/23    Adult Transthoracic Echo Complete W/  Cont if Necessary Per Protocol    Interpretation Summary    Left ventricular ejection fraction appears to be greater than 70%.  grade 1 diastolic dysfunction    mild aortic valve insuficiency    Estimated right ventricular systolic pressure from tricuspid regurgitation is normal (<35 mmHg).      Current medications:  Scheduled Meds:aspirin, 81 mg, Oral, Daily  atorvastatin, 40 mg, Oral, Daily  buPROPion XL, 150 mg, Oral, Daily  FLUoxetine, 20 mg, Oral, Daily  levothyroxine, 100 mcg, Oral, Q AM  losartan, 100 mg, Oral, Q24H  pantoprazole, 40 mg, Oral, Q AM  sodium chloride, 10 mL, Intravenous, Q12H      Continuous Infusions:sodium chloride, 100 mL/hr, Last Rate: 100 mL/hr (04/11/25 0647)      PRN Meds:.  acetaminophen **OR** acetaminophen **OR** acetaminophen    HYDROcodone-acetaminophen    melatonin    sodium chloride    sodium chloride    Assessment & Plan   Assessment & Plan     Active Hospital Problems    Diagnosis  POA    **Closed nondisplaced fracture of pubis [S32.509A]  Yes    Pubic ramus fracture [S32.599A]  Yes    Mixed hyperlipidemia [E78.2]  Yes    Hypothyroidism (acquired) [E03.9]  Yes    Primary hypertension [I10]  Yes      Resolved Hospital Problems   No resolved problems to display.        Brief Hospital Course to date:  Mary Irving is a 84 y.o. female w/ HTN, HLD, hypothyroidism, anxiety, depression, just admitted 4/5/25-4/6/25 w/ RT ACL partial tear and medial meniscal tear, s/p intraarticular steroid injection; at home she had a ground level fall w/ hip pain; CT of the pelvis showed a nondisplaced RT inferior pubic rami fracture    The following problems are new to me today    Assessment/Plan    Closed nondisplaced RT pubic rami Fx 2/2 ground level fall  Recent RT ACL partial tear and medial meniscus tear  -cont prn pain control  -weight bearing as tolerated unless directed otherwise by orthopedics, formal consult pending  -PT/OT    DM2, A1c 6.7%, w/o insulin use - SSI  HTN/HLD - asa,  statin, losartan  Hypothyroidism - levothyroxine  Anxiety/Depression - wellbutrin, prozac    Expected Discharge Location and Transportation: pending PT/OT  Expected Discharge   Expected discharge date/ time has not been documented.     VTE Prophylaxis:  Mechanical VTE prophylaxis orders are present.              CODE STATUS:   Code Status and Medical Interventions: No CPR (Do Not Attempt to Resuscitate); Limited Support; No intubation (DNI)   Ordered at: 04/10/25 2231     Code Status (Patient has no pulse and is not breathing):    No CPR (Do Not Attempt to Resuscitate)     Medical Interventions (Patient has pulse or is breathing):    Limited Support     Medical Intervention Limits:    No intubation (DNI)       Tejas Camacho, DO  04/11/25

## 2025-04-12 LAB
BACTERIA FLD CULT: NORMAL
GLUCOSE BLDC GLUCOMTR-MCNC: 146 MG/DL (ref 70–130)
GLUCOSE BLDC GLUCOMTR-MCNC: 173 MG/DL (ref 70–130)
GLUCOSE BLDC GLUCOMTR-MCNC: 190 MG/DL (ref 70–130)
GLUCOSE BLDC GLUCOMTR-MCNC: 207 MG/DL (ref 70–130)
GRAM STN SPEC: NORMAL
GRAM STN SPEC: NORMAL

## 2025-04-12 PROCEDURE — G0378 HOSPITAL OBSERVATION PER HR: HCPCS

## 2025-04-12 PROCEDURE — 97166 OT EVAL MOD COMPLEX 45 MIN: CPT

## 2025-04-12 PROCEDURE — 82948 REAGENT STRIP/BLOOD GLUCOSE: CPT

## 2025-04-12 PROCEDURE — 97162 PT EVAL MOD COMPLEX 30 MIN: CPT

## 2025-04-12 PROCEDURE — 25010000002 ENOXAPARIN PER 10 MG: Performed by: INTERNAL MEDICINE

## 2025-04-12 PROCEDURE — 63710000001 INSULIN LISPRO (HUMAN) PER 5 UNITS: Performed by: INTERNAL MEDICINE

## 2025-04-12 PROCEDURE — 99232 SBSQ HOSP IP/OBS MODERATE 35: CPT | Performed by: INTERNAL MEDICINE

## 2025-04-12 PROCEDURE — 96372 THER/PROPH/DIAG INJ SC/IM: CPT

## 2025-04-12 RX ORDER — ENOXAPARIN SODIUM 100 MG/ML
40 INJECTION SUBCUTANEOUS DAILY
Status: DISCONTINUED | OUTPATIENT
Start: 2025-04-12 | End: 2025-04-15 | Stop reason: HOSPADM

## 2025-04-12 RX ADMIN — FLUOXETINE HYDROCHLORIDE 20 MG: 20 CAPSULE ORAL at 09:05

## 2025-04-12 RX ADMIN — LEVOTHYROXINE SODIUM 100 MCG: 0.1 TABLET ORAL at 05:16

## 2025-04-12 RX ADMIN — Medication 10 ML: at 21:04

## 2025-04-12 RX ADMIN — ATORVASTATIN CALCIUM 40 MG: 40 TABLET, FILM COATED ORAL at 09:05

## 2025-04-12 RX ADMIN — PANTOPRAZOLE SODIUM 40 MG: 40 TABLET, DELAYED RELEASE ORAL at 05:15

## 2025-04-12 RX ADMIN — BUPROPION HYDROCHLORIDE 150 MG: 150 TABLET, EXTENDED RELEASE ORAL at 09:09

## 2025-04-12 RX ADMIN — ASPIRIN 81 MG: 81 TABLET, COATED ORAL at 09:06

## 2025-04-12 RX ADMIN — Medication 5 MG: at 20:48

## 2025-04-12 RX ADMIN — INSULIN LISPRO 2 UNITS: 100 INJECTION, SOLUTION INTRAVENOUS; SUBCUTANEOUS at 20:48

## 2025-04-12 RX ADMIN — ACETAMINOPHEN 650 MG: 325 TABLET, FILM COATED ORAL at 20:48

## 2025-04-12 RX ADMIN — LOSARTAN POTASSIUM 100 MG: 50 TABLET, FILM COATED ORAL at 09:06

## 2025-04-12 RX ADMIN — INSULIN LISPRO 2 UNITS: 100 INJECTION, SOLUTION INTRAVENOUS; SUBCUTANEOUS at 12:19

## 2025-04-12 RX ADMIN — Medication 10 ML: at 09:08

## 2025-04-12 RX ADMIN — ENOXAPARIN SODIUM 40 MG: 100 INJECTION SUBCUTANEOUS at 14:35

## 2025-04-12 RX ADMIN — INSULIN LISPRO 3 UNITS: 100 INJECTION, SOLUTION INTRAVENOUS; SUBCUTANEOUS at 16:44

## 2025-04-12 NOTE — PROGRESS NOTES
[facility] Medicine Services  PROGRESS NOTE    Patient Name: Mary Irving  : 1940  MRN: [id number]    Date of Admission: 4/10/2025  Primary Care Physician: Rosa Isela Bangura MD    Subjective   Subjective     CC:  F/u pelvic Fx    HPI:  No acute events overnight. Has not yet worked with therapy.      Objective   Objective     Vital Signs:   Temp:  [97 °F (36.1 °C)-98.2 °F (36.8 °C)] 98.1 °F (36.7 °C)  Heart Rate:  [60-70] 70  Resp:  [16-18] 16  BP: (133-161)/(80-94) 139/82     Physical Exam:  Constitutional: Awake, alert, NAD sitting up in bed  Respiratory: Clear to auscultation bilaterally, respiratory effort normal   Cardiovascular: RRR, palpable pedal pulses  Gastrointestinal: Positive bowel sounds, soft, nondistended, LLQ ttp  Musculoskeletal: No lower extremity edema  Psychiatric: Appropriate affect, cooperative  Neurologic: Speech clear and fluent    Results Reviewed:  LAB RESULTS:      Lab 04/11/25  0420 04/10/25  1931 04/06/25  1023 04/05/25  2009 04/05/25  1808   WBC 8.52 8.90 7.16  --  10.13   HEMOGLOBIN 13.4 15.0 13.0  --  13.4   HEMATOCRIT 41.5 45.1 40.2  --  40.1   PLATELETS 324 364 357  --  384   NEUTROS ABS 6.17 6.51 5.82  --  7.96*   IMMATURE GRANS (ABS) 0.05 0.02 0.01  --  0.03   LYMPHS ABS 1.33 1.52 0.59*  --  0.98   MONOS ABS 0.84 0.81 0.64  --  1.10*   EOS ABS 0.11 0.04 0.08  --  0.02   MCV 95.6 93.8 96.2  --  93.5   SED RATE  --   --   --  44*  --    CRP  --   --   --  8.66*  --          Lab 04/11/25  0419 04/10/25  1931 04/06/25  1023 04/05/25  2009 04/05/25  1808   SODIUM 138 139 130* 130*  --    POTASSIUM 4.1 4.4 3.7 3.8  --    CHLORIDE 105 102 96* 94*  --    CO2 23.0 24.7 22.0 25.0  --    ANION GAP 10.0 12.3 12.0 11.0  --    BUN 13 12 11 10  --    CREATININE 0.59 0.49* 0.71 0.57  --    EGFR 90.1 94.2 85.0 90.9  --    GLUCOSE 147* 112* 303* 230*  --    CALCIUM 8.6 9.7 8.8 9.2  --    HEMOGLOBIN A1C  --   --   --   --  6.70*   TSH 9.540*  --   --  3.190   --          Lab 04/10/25  1931 04/06/25  1023 04/05/25 2009   TOTAL PROTEIN 7.2 6.6 6.7   ALBUMIN 4.0 3.7 3.9   GLOBULIN 3.2 2.9 2.8   ALT (SGPT) 16 14 11   AST (SGOT) 21 16 17   BILIRUBIN 0.5 0.6 0.7   ALK PHOS 177* 165* 174*                     Brief Urine Lab Results  (Last result in the past 365 days)        Color   Clarity   Blood   Leuk Est   Nitrite   Protein   CREAT   Urine HCG        04/05/25 1109 Yellow   Clear   Negative   Negative   Negative   Negative                   Microbiology Results Abnormal       None            CT Pelvis Without Contrast  Result Date: 4/10/2025  CT PELVIS WO CONTRAST Date of Exam: 4/10/2025 2:41 PM EDT Indication: Fall, right buttock pain. Comparison: CT pelvis performed on 7/9/2023. Technique: Axial CT images were obtained of the pelvis without contrast administration.  Reconstructed coronal and sagittal images were also obtained. Automated exposure control and iterative construction methods were used. Findings: There is a nondisplaced fracture through the right inferior pubic ramus. There are no additional acute bony abnormalities. The patient's right hip arthroplasty is intact. There is narrowing, subchondral sclerosis, and spurring of the left hip joint, pubic symphysis, and sacroiliac joints indicating arthritic changes. There are degenerative changes within the lower lumbar spine. There are atherosclerotic vascular calcifications in the pelvis. There is increased stool burden. The uterus is surgically absent. There is no soft tissue hematoma.     Impression: Impression: 1.Nondisplaced fracture through the right inferior pubic ramus. 2.The patient's right hip arthroplasty is intact. 3.Degenerative changes. 4.Additional findings as noted above. Electronically Signed: Jose Rivera MD  4/10/2025 3:21 PM EDT  Workstation ID: [id number]      Results for orders placed during the hospital encounter of 11/10/23    Adult Transthoracic Echo Complete W/ Cont if Necessary Per  Protocol    Interpretation Summary    Left ventricular ejection fraction appears to be greater than 70%.  grade 1 diastolic dysfunction    mild aortic valve insuficiency    Estimated right ventricular systolic pressure from tricuspid regurgitation is normal (<35 mmHg).      Current medications:  Scheduled Meds:aspirin, 81 mg, Oral, Daily  atorvastatin, 40 mg, Oral, Daily  buPROPion XL, 150 mg, Oral, Daily  FLUoxetine, 20 mg, Oral, Daily  insulin lispro, 2-7 Units, Subcutaneous, 4x Daily AC & at Bedtime  levothyroxine, 100 mcg, Oral, Q AM  losartan, 100 mg, Oral, Q24H  pantoprazole, 40 mg, Oral, Q AM  sodium chloride, 10 mL, Intravenous, Q12H      Continuous Infusions:sodium chloride, 100 mL/hr, Last Rate: 100 mL/hr (04/11/25 1809)      PRN Meds:.  acetaminophen **OR** acetaminophen **OR** acetaminophen    Calcium Replacement - Follow Nurse / BPA Driven Protocol    dextrose    dextrose    glucagon (human recombinant)    HYDROcodone-acetaminophen    Magnesium Standard Dose Replacement - Follow Nurse / BPA Driven Protocol    melatonin    Phosphorus Replacement - Follow Nurse / BPA Driven Protocol    Potassium Replacement - Follow Nurse / BPA Driven Protocol    sodium chloride    sodium chloride    Assessment & Plan   Assessment & Plan     Active Hospital Problems    Diagnosis  POA    **Closed nondisplaced fracture of pubis [S32.509A]  Yes    Pubic ramus fracture [S32.599A]  Yes    Mixed hyperlipidemia [E78.2]  Yes    Hypothyroidism (acquired) [E03.9]  Yes    Primary hypertension [I10]  Yes      Resolved Hospital Problems   No resolved problems to display.        Brief Hospital Course to date:  Mary Irving is a 84 y.o. female w/ HTN, HLD, hypothyroidism, anxiety, depression, just admitted 4/5/25-4/6/25 w/ RT ACL partial tear and medial meniscal tear, s/p intraarticular steroid injection; at home she had a ground level fall w/ hip pain; CT of the pelvis showed a nondisplaced RT inferior pubic rami  fracture    Assessment/Plan    Closed nondisplaced RT pubic rami Fx 2/2 ground level fall  Recent RT ACL partial tear and medial meniscus tear  -cont prn pain control  -seen by ortho, non-op; WBAT, use a walker, rec DVT ppx for 3-4 weeks  -PT/OT pending    DM2, A1c 6.7%, w/o insulin use - SSI  HTN/HLD - asa, statin, losartan  Hypothyroidism - levothyroxine  Anxiety/Depression - wellbutrin, prozac    Expected Discharge Location and Transportation: pending PT/OT  Expected Discharge   Expected discharge date/ time has not been documented.     VTE Prophylaxis:  Mechanical VTE prophylaxis orders are present.         AM-PAC 6 Clicks Score (PT): 14 (04/12/25 5956)    CODE STATUS:   Code Status and Medical Interventions: No CPR (Do Not Attempt to Resuscitate); Limited Support; No intubation (DNI)   Ordered at: 04/10/25 7324     Code Status (Patient has no pulse and is not breathing):    No CPR (Do Not Attempt to Resuscitate)     Medical Interventions (Patient has pulse or is breathing):    Limited Support     Medical Intervention Limits:    No intubation (DNI)       Tejas Camacho,   04/12/25

## 2025-04-12 NOTE — PLAN OF CARE
Goal Outcome Evaluation:      VSS on RA and patient A&Ox4. Complaints of pain with activity but none at rest so PRN PO tylenol administered and effective per pt. Voiding well. Up in chair. SCDs on. IVFs infusing. Call light in reach

## 2025-04-12 NOTE — PLAN OF CARE
Goal Outcome Evaluation:  Plan of Care Reviewed With: patient           Outcome Evaluation: PT evaluation completed. Pt presenting with generalized weakness, pain with mobility R>L, impaired balance and sequencing, minor ataxia, and limited activity tolerance warranting IP PT services to address functional deficits and facilitate return to baseline. Recommend IRF following d/c.    Anticipated Discharge Disposition (PT): inpatient rehabilitation facility

## 2025-04-12 NOTE — PLAN OF CARE
Problem: Adult Inpatient Plan of Care  Goal: Patient-Specific Goal (Individualized)  Outcome: Not Progressing  Goal: Readiness for Transition of Care  Outcome: Not Progressing     Problem: Mobility Impairment  Goal: Optimal Mobility  Outcome: Not Progressing  Intervention: Optimize Mobility  Recent Flowsheet Documentation  Taken 4/12/2025 0600 by Rose Dillon RN  Activity Management: activity encouraged  Positioning/Transfer Devices:   pillows   in use  Taken 4/12/2025 0400 by Rose Dillon RN  Activity Management: activity encouraged  Positioning/Transfer Devices:   pillows   in use  Taken 4/12/2025 0200 by Rose Dillon RN  Activity Management: activity encouraged  Positioning/Transfer Devices:   pillows   in use  Taken 4/12/2025 0000 by Rose Dillon RN  Activity Management: activity encouraged  Positioning/Transfer Devices:   pillows   in use  Taken 4/11/2025 2230 by Rose Dillon RN  Activity Management: activity encouraged  Positioning/Transfer Devices:   pillows   in use  Taken 4/11/2025 1939 by Rose Dillon RN  Assistive Device Utilized: gait belt     Problem: Adult Inpatient Plan of Care  Goal: Plan of Care Review  Outcome: Progressing  Goal: Absence of Hospital-Acquired Illness or Injury  Outcome: Progressing  Intervention: Identify and Manage Fall Risk  Recent Flowsheet Documentation  Taken 4/12/2025 0600 by Rose Dillon RN  Safety Promotion/Fall Prevention:   activity supervised   room organization consistent   safety round/check completed   fall prevention program maintained   clutter free environment maintained   gait belt   nonskid shoes/slippers when out of bed  Taken 4/12/2025 0400 by Rose Dillon, DONALD  Safety Promotion/Fall Prevention:   activity supervised   room organization consistent   safety round/check completed   fall prevention program maintained   clutter free environment maintained   gait belt   nonskid shoes/slippers when out of bed  Taken 4/12/2025 0200 by Rose Dillon, RN  Safety  Promotion/Fall Prevention:   activity supervised   room organization consistent   safety round/check completed   fall prevention program maintained   clutter free environment maintained   gait belt   nonskid shoes/slippers when out of bed  Taken 4/12/2025 0000 by Rose Dillon RN  Safety Promotion/Fall Prevention:   activity supervised   room organization consistent   safety round/check completed   fall prevention program maintained   clutter free environment maintained   gait belt   nonskid shoes/slippers when out of bed  Taken 4/11/2025 2230 by Rose Dillon RN  Safety Promotion/Fall Prevention:   activity supervised   room organization consistent   safety round/check completed   fall prevention program maintained   clutter free environment maintained   gait belt   nonskid shoes/slippers when out of bed  Intervention: Prevent Skin Injury  Recent Flowsheet Documentation  Taken 4/12/2025 0600 by Rose Dillon RN  Body Position: position maintained  Skin Protection: incontinence pads utilized  Taken 4/12/2025 0400 by Rose Dillon RN  Body Position: position maintained  Skin Protection: incontinence pads utilized  Taken 4/12/2025 0200 by Rose Dillon RN  Body Position: position maintained  Skin Protection: incontinence pads utilized  Taken 4/12/2025 0000 by Rose Dillon RN  Body Position: position maintained  Skin Protection: incontinence pads utilized  Taken 4/11/2025 2230 by Rose Dillon RN  Body Position: position maintained  Skin Protection: incontinence pads utilized  Taken 4/11/2025 1939 by Rose Dillon RN  Skin Protection: incontinence pads utilized  Intervention: Prevent and Manage VTE (Venous Thromboembolism) Risk  Recent Flowsheet Documentation  Taken 4/11/2025 1939 by Rose Dillon RN  VTE Prevention/Management: SCDs (sequential compression devices) on  Intervention: Prevent Infection  Recent Flowsheet Documentation  Taken 4/12/2025 0600 by Rose Dillon RN  Infection Prevention: environmental  surveillance performed  Taken 4/12/2025 0400 by Rose Dillon RN  Infection Prevention: environmental surveillance performed  Taken 4/12/2025 0200 by Rose Dillon RN  Infection Prevention: environmental surveillance performed  Taken 4/12/2025 0000 by Rose Dillon RN  Infection Prevention: environmental surveillance performed  Taken 4/11/2025 2230 by Rose Dillon RN  Infection Prevention: environmental surveillance performed  Goal: Optimal Comfort and Wellbeing  Outcome: Progressing  Intervention: Monitor Pain and Promote Comfort  Recent Flowsheet Documentation  Taken 4/12/2025 0400 by Rose Dillon RN  Pain Management Interventions: pain medication given  Taken 4/12/2025 0200 by Rose Dillon RN  Pain Management Interventions: pain medication given  Taken 4/11/2025 2230 by Rose Dillon RN  Pain Management Interventions: pain medication given  Taken 4/11/2025 1939 by Rose Dillon RN  Pain Management Interventions: position adjusted  Intervention: Provide Person-Centered Care  Recent Flowsheet Documentation  Taken 4/11/2025 1939 by Rose Dillon RN  Trust Relationship/Rapport: care explained     Problem: Fall Injury Risk  Goal: Absence of Fall and Fall-Related Injury  Outcome: Progressing  Intervention: Identify and Manage Contributors  Recent Flowsheet Documentation  Taken 4/12/2025 0600 by Rose Dillon RN  Self-Care Promotion: independence encouraged  Taken 4/12/2025 0400 by Rose Dillon RN  Self-Care Promotion: independence encouraged  Taken 4/12/2025 0200 by Rose Dillon RN  Self-Care Promotion: independence encouraged  Taken 4/12/2025 0000 by Rose Dillon RN  Self-Care Promotion: independence encouraged  Taken 4/11/2025 2230 by Rose Dillon RN  Self-Care Promotion: independence encouraged  Taken 4/11/2025 1939 by Rose Dillon RN  Medication Review/Management: medications reviewed  Self-Care Promotion: independence encouraged  Intervention: Promote Injury-Free Environment  Recent Flowsheet  Documentation  Taken 4/12/2025 0600 by Rose Dillon RN  Safety Promotion/Fall Prevention:   activity supervised   room organization consistent   safety round/check completed   fall prevention program maintained   clutter free environment maintained   gait belt   nonskid shoes/slippers when out of bed  Taken 4/12/2025 0400 by Rose Dillon RN  Safety Promotion/Fall Prevention:   activity supervised   room organization consistent   safety round/check completed   fall prevention program maintained   clutter free environment maintained   gait belt   nonskid shoes/slippers when out of bed  Taken 4/12/2025 0200 by Rose Dillon RN  Safety Promotion/Fall Prevention:   activity supervised   room organization consistent   safety round/check completed   fall prevention program maintained   clutter free environment maintained   gait belt   nonskid shoes/slippers when out of bed  Taken 4/12/2025 0000 by Rose Dillon RN  Safety Promotion/Fall Prevention:   activity supervised   room organization consistent   safety round/check completed   fall prevention program maintained   clutter free environment maintained   gait belt   nonskid shoes/slippers when out of bed  Taken 4/11/2025 2230 by Rose Dillon RN  Safety Promotion/Fall Prevention:   activity supervised   room organization consistent   safety round/check completed   fall prevention program maintained   clutter free environment maintained   gait belt   nonskid shoes/slippers when out of bed     Problem: Skin Injury Risk Increased  Goal: Skin Health and Integrity  Outcome: Progressing  Intervention: Optimize Skin Protection  Recent Flowsheet Documentation  Taken 4/12/2025 0600 by Rose Dillon RN  Activity Management: activity encouraged  Pressure Reduction Techniques: frequent weight shift encouraged  Head of Bed (HOB) Positioning: HOB elevated  Pressure Reduction Devices: pressure-redistributing mattress utilized  Skin Protection: incontinence pads utilized  Taken  4/12/2025 0400 by Rose Dillon RN  Activity Management: activity encouraged  Pressure Reduction Techniques: frequent weight shift encouraged  Head of Bed (HOB) Positioning: HOB elevated  Pressure Reduction Devices: pressure-redistributing mattress utilized  Skin Protection: incontinence pads utilized  Taken 4/12/2025 0200 by Rose Dillon RN  Activity Management: activity encouraged  Pressure Reduction Techniques: frequent weight shift encouraged  Head of Bed (HOB) Positioning: HOB elevated  Pressure Reduction Devices: pressure-redistributing mattress utilized  Skin Protection: incontinence pads utilized  Taken 4/12/2025 0000 by Rose Dillon RN  Activity Management: activity encouraged  Pressure Reduction Techniques: frequent weight shift encouraged  Head of Bed (HOB) Positioning: HOB elevated  Pressure Reduction Devices: pressure-redistributing mattress utilized  Skin Protection: incontinence pads utilized  Taken 4/11/2025 2230 by Rose Dillon RN  Activity Management: activity encouraged  Pressure Reduction Techniques: frequent weight shift encouraged  Head of Bed (HOB) Positioning: HOB elevated  Pressure Reduction Devices: pressure-redistributing mattress utilized  Skin Protection: incontinence pads utilized  Taken 4/11/2025 1939 by Rose Dillon RN  Pressure Reduction Techniques: frequent weight shift encouraged  Pressure Reduction Devices: pressure-redistributing mattress utilized  Skin Protection: incontinence pads utilized  Intervention: Promote and Optimize Oral Intake  Recent Flowsheet Documentation  Taken 4/12/2025 0600 by Rose Dillon RN  Oral Nutrition Promotion: rest periods promoted  Taken 4/12/2025 0400 by Rose Dillon RN  Oral Nutrition Promotion: rest periods promoted  Taken 4/12/2025 0200 by Rose Dillon RN  Oral Nutrition Promotion: rest periods promoted  Taken 4/12/2025 0000 by Rose Dillon RN  Oral Nutrition Promotion: rest periods promoted  Taken 4/11/2025 2230 by Rose Dillon RN  Oral  Nutrition Promotion: rest periods promoted   Goal Outcome Evaluation:

## 2025-04-12 NOTE — THERAPY EVALUATION
Patient Name: Mary Irving  : 1940    MRN: [id number]                              Today's Date: 2025       Admit Date: 4/10/2025    Visit Dx:     ICD-10-CM ICD-9-CM   1. Closed fracture of right inferior pubic ramus, initial encounter  S32.591A 808.2     Patient Active Problem List   Diagnosis    Severe obstructive sleep apnea    Primary hypertension    Anxiety associated with depression    Dyslipidemia    Hypothyroidism (acquired)    Degenerative arthritis    Fatigue    Obesity (BMI 30-39.9)    Mild intermittent asthma    Weakness of both lower extremities    Right knee pain    Mixed hyperlipidemia    Primary osteoarthritis of right knee    Fatigue    Acute torn meniscus    Hyponatremia    Closed nondisplaced fracture of pubis    Pubic ramus fracture     Past Medical History:   Diagnosis Date    Acid reflux     Anemia     Arthritis     Arthritis     Asthma     Breast injury     HIT LT BREAST WITH HANDLEBAR AS YOUNG CHILD    Depression     Diabetes     Disease of thyroid gland     History of stress test     20 plus years ago    Hyperlipidemia     Hypertension     Thyroid disorder      Past Surgical History:   Procedure Laterality Date    BREAST BIOPSY Right 2010    CATARACT EXTRACTION, BILATERAL      FOOT SURGERY Bilateral     HYSTERECTOMY      AGE 42    OOPHORECTOMY Bilateral     AGE 42      General Information       Row Name 25 1138          OT Time and Intention    Subjective Information no complaints  -TB     Document Type evaluation  -TB     Mode of Treatment occupational therapy  -TB     Patient Effort good  -TB     Symptoms Noted During/After Treatment none  -TB       Row Name 25 1138          General Information    Patient Profile Reviewed yes  -TB     Prior Level of Function independent:;all household mobility;community mobility;ADL's;home management  -TB     Existing Precautions/Restrictions fall;other (see comments)  s/p fall with R  hip & LE pain, recent R ACL tear,  WBAT RLE protected with RW. Ataxia.  -TB     Barriers to Rehab medically complex;previous functional deficit;physical barrier  -TB       Row Name 04/12/25 1138          Occupational Profile    Reason for Services/Referral (Occupational Profile) Occupational decline  -TB     Environmental Supports and Barriers (Occupational Profile) Pt lives alone at baseline in a split level home with 2 steps to enter and 8 interior stairs. All pt needs met on main level. Tub/shower. Comfort ht commodes. SPC prn. Falls. Independent with self-care prior. Limited family support.  -TB       Row Name 04/12/25 1138          Living Environment    Current Living Arrangements home  -TB     People in Home alone  -TB       Row Name 04/12/25 1138          Home Main Entrance    Number of Stairs, Main Entrance two  -TB     Stair Railings, Main Entrance railings safe and in good condition  -TB       Row Name 04/12/25 1138          Stairs Within Home, Primary    Stairs, Within Home, Primary All pt needs met on main floor  -TB     Number of Stairs, Within Home, Primary eight  -TB     Stair Railings, Within Home, Primary railings safe and in good condition  -TB       Row Name 04/12/25 1138          Cognition    Orientation Status (Cognition) oriented x 4  -TB       Row Name 04/12/25 1138          Safety Issues/Impairments Affecting Functional Mobility    Safety Issues Affecting Function (Mobility) awareness of need for assistance;insight into deficits/self-awareness;judgment;positioning of assistive device;problem-solving;safety precaution awareness;safety precautions follow-through/compliance;sequencing abilities  -TB     Impairments Affecting Function (Mobility) balance;endurance/activity tolerance;pain;strength;postural/trunk control;motor control  -TB     Comment, Safety Issues/Impairments (Mobility) Pt is up in room/bathroom and transfering with Min Ax2. LOB x1 standing from commode requiring Mod Ax2 to steady.  -TB               User Key  (r)  = Recorded By, (t) = Taken By, (c) = Cosigned By      Initials Name Provider Type    TB Charo De La Fuente, OT Occupational Therapist                     Mobility/ADL's       Row Name 04/12/25 1143          Bed Mobility    Comment, (Bed Mobility) UIC  -       Row Name 04/12/25 1143          Transfers    Transfers sit-stand transfer;toilet transfer;bed-chair transfer  -TB     Comment, (Transfers) Education, cues, and assist for hand placement, sequencing, and safety all transfers.  -       Row Name 04/12/25 1143          Bed-Chair Transfer    Bed-Chair [place] (Transfers) minimum assist (75% patient effort);2 person assist;verbal cues  -TB     Assistive Device (Bed-Chair Transfers) walker, front-wheeled  -TB       Row Name 04/12/25 1143          Sit-Stand Transfer    Sit-Stand [place] (Transfers) minimum assist (75% patient effort);2 person assist;verbal cues  -TB     Assistive Device (Sit-Stand Transfers) walker, front-wheeled  -TB       Row Name 04/12/25 1143          Toilet Transfer    Type (Toilet Transfer) sit-stand  -TB     [place] Level (Toilet Transfer) moderate assist (50% patient effort);2 person assist;verbal cues  -TB     Assistive Device (Toilet Transfer) grab bars/safety frame;raised toilet seat;walker, front-wheeled  -TB     Comment, (Toilet Transfer) Assist to transition hand placement to<>from grab bar<>RW. LOB standing from commodes requiring assist to recover.  -       Row Name 04/12/25 1143          Functional Mobility    Functional Mobility- Ind. Level minimum assist (75% patient effort);2 person assist required;verbal cues required  -TB     Functional Mobility- Device walker, front-wheeled  -TB     Functional Mobility-Distance (Feet) 30  -TB     Functional Mobility- Safety Issues step length decreased;weight-shifting ability decreased;sequencing ability decreased;balance decreased during turns  -TB     Functional Mobility- Comment Pt is up in room/bathroom with  encouragment and time. Presents with intermittent ataxic gait. Requires significant cuing for sequencing and safety.  -TB     Patient was able to Ambulate yes  -TB       Row Name 04/12/25 1143          Activities of Daily Living    BADL Assessment/Intervention toileting;grooming;lower body dressing  -TB       Row Name 04/12/25 1143          Mobility    Extremity Weight-bearing Status right lower extremity  -TB     Right Lower Extremity (Weight-bearing Status) weight-bearing as tolerated (WBAT)  -TB       Row Name 04/12/25 1143          Toileting Assessment/Training    [place] Level (Toileting) dependent (less than 25% patient effort);adjust/manage clothing;set up;perform perineal hygiene  -TB     Position (Toileting) unsupported sitting;supported standing  -TB       Row Name 04/12/25 1143          Grooming Assessment/Training    [place] Level (Grooming) set up  -TB     Comment, (Grooming) to wash/dry hands following toileting  -TB       Row Name 04/12/25 1143          Lower Body Dressing Assessment/Training    [place] Level (Lower Body Dressing) don;socks;dependent (less than 25% patient effort)  -TB     Position (Lower Body Dressing) unsupported sitting  -TB     Comment, (Lower Body Dressing) AE TBD in treatment. Pt limited by pain and fatigue at IE.  -TB               User Key  (r) = Recorded By, (t) = Taken By, (c) = Cosigned By      Initials Name Provider Type    Charo Medina, OT Occupational Therapist                   Obj/Interventions       Row Name 04/12/25 1148          Sensory Assessment (Somatosensory)    Sensory Assessment (Somatosensory) left UE  -TB     Sensory Subjective Reports numbness  -TB     Sensory Assessment Pt reports numness L hand following remote wrist fx.  -TB       Row Name 04/12/25 1148          Sensory Interventions    Sensory Re-education (Sensation) visual observation of sensory input  -TB       Row Name 04/12/25 1148          Vision Assessment/Intervention     Visual Impairment/Limitations corrective lenses for reading  -TB       Row Name 04/12/25 1148          Range of Motion Comprehensive    General Range of Motion bilateral upper extremity ROM WFL  -TB     Comment, General Range of Motion BUE AROM WFL for self-care performance  -TB       Row Name 04/12/25 1148          Strength Comprehensive (MMT)    Comment, General Manual Muscle Testing (MMT) Assessment Generalized weakness/deconditioned. BUE 4-/5.  -TB       Row Name 04/12/25 1148          Motor Skills    Motor Skills functional endurance  -TB     Functional Endurance Pt fatigues easily with minimal activity.  -TB       [facility] Name 04/12/25 1148          Balance    Balance Assessment sitting static balance;sitting dynamic balance;sit to stand dynamic balance;standing static balance;standing dynamic balance  -TB     Static Sitting Balance supervision  -TB     Dynamic Sitting Balance standby assist  -TB     Position, Sitting Balance unsupported;sitting in chair  commode  -TB     Sit to Stand Dynamic Balance minimal assist;2-person assist;verbal cues  -TB     Static Standing Balance minimal assist;2-person assist;verbal cues  -TB     Dynamic Standing Balance moderate assist;2-person assist;verbal cues  -TB     Position/Device Used, Standing Balance walker, front-wheeled  -TB     Balance Interventions sitting;standing;sit to stand;supported;static;dynamic;dynamic reaching;occupation based/functional task  -TB     Comment, Balance LOB standing from commode requiring Mod Ax2 to recover.  -TB               User Key  (r) = Recorded By, (t) = Taken By, (c) = Cosigned By      Initials Name Provider Type    TB Charo De La Fuente OT Occupational Therapist                   Goals/Plan       [facility] Name 04/12/25 1156          Transfer Goal 1 (OT)    Activity/Assistive Device (Transfer Goal 1, OT) sit-to-stand/stand-to-sit;toilet  -TB     [place] Level/Cues Needed (Transfer Goal 1, OT) minimum assist (75% or more patient  effort);verbal cues required  -TB     Time Frame (Transfer Goal 1, OT) long term goal (LTG);1 week  -TB     Progress/Outcome (Transfer Goal 1, OT) goal ongoing  -TB       Row Name 04/12/25 1156          Dressing Goal 1 (OT)    Activity/Device (Dressing Goal 1, OT) lower body dressing  -TB     [place]/Cues Needed (Dressing Goal 1, OT) minimum assist (75% or more patient effort);verbal cues required  -TB     Time Frame (Dressing Goal 1, OT) long term goal (LTG);1 week  -TB     Progress/Outcome (Dressing Goal 1, OT) goal ongoing  -TB       Row Name 04/12/25 1156          Grooming Goal 1 (OT)    Activity/Device (Grooming Goal 1, OT) oral care;hair care;wash face, hands  -TB     [place] (Grooming Goal 1, OT) contact guard required  -TB     Time Frame (Grooming Goal 1, OT) short term goal (STG);3 days  -TB     Strategies/Barriers (Grooming Goal 1, OT) Standing sink side for morning grooming tasks.  -TB     Progress/Outcome (Grooming Goal 1, OT) goal ongoing  -TB               User Key  (r) = Recorded By, (t) = Taken By, (c) = Cosigned By      Initials Name Provider Type    TB Charo De La Fuente, OT Occupational Therapist                   Clinical Impression       Row Name 04/12/25 1151          Pain Assessment    Pain Location extremity;hip  -TB     Pain Side/Orientation right;lower  -TB     Pain Management Interventions activity modification encouraged;exercise or physical activity utilized;positioning techniques utilized  -TB     Response to Pain Interventions activity participation with increased pain;activity level improved  -TB     Additional Documentation Pain Scale: FACES Pre/Post-Treatment (Group)  -TB       Row Name 04/12/25 1151          Pain Scale: FACES Pre/Post-Treatment    Pain: FACES Scale, Pretreatment 2-->hurts little bit  -TB     Posttreatment Pain Rating 6-->hurts even more  -TB       Row Name 04/12/25 1151          Plan of Care Review    Plan of Care Reviewed With patient  -TB      Progress --  IE  -TB     Outcome Evaluation OT IE completed. Pt is A/Ox4 and participates in therapy with good effort. BUE AROM, strength, and sensation are WFL for self-care performance. Pt presents below her baseline with generalized weakness, balance impairments, and endurance deficits. Min Ax2 STS. Up in room/bathroom RW support and Min Ax2. Presents with intermittent ataxic gait. Requires significant cuing for sequencing and safety. LOB x1 standing from commode requiring Mod Ax2 to recover. Pt requires assist with all LB ADLs and toileting at this time. OT will follow IP. Recommend IRF at d/c for best outcome if pt is medically appropriate.  -TB       Row Name 04/12/25 1151          Therapy Assessment/Plan (OT)    Rehab Potential (OT) good  -TB     Criteria for Skilled Therapeutic Interventions Met (OT) yes;meets criteria;skilled treatment is necessary  -TB     Therapy Frequency (OT) daily  -TB       Row Name 04/12/25 1151          Therapy Plan Review/Discharge Plan (OT)    Anticipated Discharge Disposition (OT) inpatient rehabilitation facility  -TB       Row Name 04/12/25 1151          Vital Signs    Pre Systolic BP Rehab --  RN cleared OT  -TB     Pre SpO2 (%) 96  -TB     O2 Delivery Pre Treatment room air  -TB     Intra SpO2 (%) 97  -TB     O2 Delivery Intra Treatment room air  -TB     Post SpO2 (%) 98  -TB     O2 Delivery Post Treatment room air  -TB     Pre Patient Position Sitting  -TB     Intra Patient Position Standing  -TB     Post Patient Position Sitting  -TB       Row Name 04/12/25 1151          Positioning and Restraints    Pre-Treatment Position sitting in chair/recliner  -TB     Post Treatment Position chair  -TB     In Chair notified nsg;reclined;call light within reach;encouraged to call for assist;exit alarm on;waffle cushion;legs elevated  -TB               User Key  (r) = Recorded By, (t) = Taken By, (c) = Cosigned By      Initials Name Provider Type    Charo Medina, OT  Occupational Therapist                   Outcome Measures       Row Name 04/12/25 1158          How much help from another is currently needed...    Putting on and taking off regular lower body clothing? 1  -TB     Bathing (including washing, rinsing, and drying) 2  -TB     Toileting (which includes using toilet bed pan or urinal) 2  -TB     Putting on and taking off regular upper body clothing 3  -TB     Taking care of personal grooming (such as brushing teeth) 3  -TB     Eating meals 3  -TB     AM-PAC 6 Clicks Score (OT) 14  -TB       Row Name 04/12/25 1157          How much help from another person do you currently need...    Turning from your back to your side while in flat bed without using bedrails? 3  -ND     Moving from lying on back to sitting on the side of a flat bed without bedrails? 3  -ND     Moving to and from a bed to a chair (including a wheelchair)? 2  -ND     Standing up from a chair using your arms (e.g., wheelchair, bedside chair)? 2  -ND     Climbing 3-5 steps with a railing? 2  -ND     To walk in hospital room? 2  -ND     AM-PAC 6 Clicks Score (PT) 14  -ND     Highest Level of Mobility Goal 4 --> Transfer to chair/commode  -ND       Row Name 04/12/25 1158 04/12/25 1157       Functional Assessment    Outcome Measure Options AM-PAC 6 Clicks Daily Activity (OT)  -TB AM-PAC 6 Clicks Basic Mobility (PT)  -ND              User Key  (r) = Recorded By, (t) = Taken By, (c) = Cosigned By      Initials Name Provider Type    TB Charo De La Fuente, OT Occupational Therapist    Oneida Quiñones, MARY Physical Therapist                    Occupational Therapy Education       Title: PT OT SLP Therapies (In Progress)       Topic: Occupational Therapy (In Progress)       Point: ADL training (In Progress)       Learning Progress Summary            Patient Acceptance, E,D, NR by TB at 4/12/2025 1159                                      User Key       Initials Effective Dates Name Provider Type Discipline     TB 07/11/23 -  Charo De La Fuente OT Occupational Therapist OT                  OT Recommendation and Plan  Therapy Frequency (OT): daily  Plan of Care Review  Plan of Care Reviewed With: patient  Progress:  (IE)  Outcome Evaluation: OT IE completed. Pt is A/Ox4 and participates in therapy with good effort. BUE AROM, strength, and sensation are WFL for self-care performance. Pt presents below her baseline with generalized weakness, balance impairments, and endurance deficits. Min Ax2 STS. Up in room/bathroom RW support and Min Ax2. Presents with intermittent ataxic gait. Requires significant cuing for sequencing and safety. LOB x1 standing from commode requiring Mod Ax2 to recover. Pt requires assist with all LB ADLs and toileting at this time. OT will follow IP. Recommend IRF at d/c for best outcome if pt is medically appropriate.     Time Calculation:   Evaluation Complexity (OT)  Review Occupational Profile/Medical/Therapy History Complexity: expanded/moderate complexity  Assessment, Occupational Performance/Identification of Deficit Complexity: 3-5 performance deficits  Clinical Decision Making Complexity (OT): detailed assessment/moderate complexity  Overall Complexity of Evaluation (OT): moderate complexity     Time Calculation- OT       Row Name 04/12/25 1038             Time Calculation- OT    OT Start Time 1038  -TB      OT Received On 04/12/25  -TB      OT Goal Re-Cert Due Date 04/22/25  -TB         Untimed Charges    OT Eval/Re-eval Minutes 53  -TB         Total Minutes    Untimed Charges Total Minutes 53  -TB       Total Minutes 53  -TB                User Key  (r) = Recorded By, (t) = Taken By, (c) = Cosigned By      Initials Name Provider Type    TB Charo De La Fuente OT Occupational Therapist                  Therapy Charges for Today       Code Description Service Date Service Provider Modifiers Qty    [id number]  OT EVAL MOD COMPLEXITY 4 4/12/2025 Charo De La Fuente OT GO 1                  Charo De La Fuente, OT  4/12/2025

## 2025-04-12 NOTE — PLAN OF CARE
Goal Outcome Evaluation:  Plan of Care Reviewed With: patient        Progress: no change  Outcome Evaluation: Up to recliner and tolerates well. Glucoses 146, 173, and 207. Lovenox added today. Worked with PT see notes.

## 2025-04-12 NOTE — THERAPY EVALUATION
Patient Name: Mary Irving  : 1940    MRN: [id number]                              Today's Date: 2025       Admit Date: 4/10/2025    Visit Dx:     ICD-10-CM ICD-9-CM   1. Closed fracture of right inferior pubic ramus, initial encounter  S32.591A 808.2     Patient Active Problem List   Diagnosis    Severe obstructive sleep apnea    Primary hypertension    Anxiety associated with depression    Dyslipidemia    Hypothyroidism (acquired)    Degenerative arthritis    Fatigue    Obesity (BMI 30-39.9)    Mild intermittent asthma    Weakness of both lower extremities    Right knee pain    Mixed hyperlipidemia    Primary osteoarthritis of right knee    Fatigue    Acute torn meniscus    Hyponatremia    Closed nondisplaced fracture of pubis    Pubic ramus fracture     Past Medical History:   Diagnosis Date    Acid reflux     Anemia     Arthritis     Arthritis     Asthma     Breast injury     HIT LT BREAST WITH HANDLEBAR AS YOUNG CHILD    Depression     Diabetes     Disease of thyroid gland     History of stress test     20 plus years ago    Hyperlipidemia     Hypertension     Thyroid disorder      Past Surgical History:   Procedure Laterality Date    BREAST BIOPSY Right 2010    CATARACT EXTRACTION, BILATERAL      FOOT SURGERY Bilateral     HYSTERECTOMY      AGE 42    OOPHORECTOMY Bilateral     AGE 42      General Information       Row Name 25 1135          Physical Therapy Time and Intention    Document Type evaluation  -ND     Mode of Treatment physical therapy  -ND       Row Name 25 1135          General Information    Patient Profile Reviewed yes  -ND     Prior Level of Function independent:;all household mobility;community mobility;gait;transfer;bed mobility  -ND     Existing Precautions/Restrictions fall;other (see comments);right  WBAT.  -ND     Barriers to Rehab previous functional deficit  -ND       Row Name 25 1135          Living Environment    Current Living Arrangements home   -ND     People in Home alone  -ND       Row Name 04/12/25 1135          Home Main Entrance    Number of Stairs, Main Entrance six;other (see comments)  Pt reports 6 but prior notes report 2 JEFF.  -ND     Stair Railings, Main Entrance railing on right side (ascending)  -ND       Row Name 04/12/25 1135          Stairs Within Home, Primary    Stairs, Within Home, Primary Needs can be met on main level of home.  -ND       Row Name 04/12/25 1135          Cognition    Orientation Status (Cognition) oriented x 3  -ND       Row Name 04/12/25 1135          Safety Issues/Impairments Affecting Functional Mobility    Safety Issues Affecting Function (Mobility) awareness of need for assistance;insight into deficits/self-awareness;problem-solving;safety precaution awareness;safety precautions follow-through/compliance;sequencing abilities;steps too close to assistive device;judgment  -ND     Impairments Affecting Function (Mobility) balance;coordination;endurance/activity tolerance;motor planning;postural/trunk control;range of motion (ROM);pain;strength  -ND               User Key  (r) = Recorded By, (t) = Taken By, (c) = Cosigned By      Initials Name Provider Type    ND Oneida Griffith, PT Physical Therapist                   Mobility       Row Name 04/12/25 1144          Bed Mobility    Comment, (Bed Mobility) Pt found and left sitting UIC.  -ND       Row Name 04/12/25 1144          Sit-Stand Transfer    Sit-Stand [place] (Transfers) minimum assist (75% patient effort);2 person assist;verbal cues;nonverbal cues (demo/gesture)  -ND     Assistive Device (Sit-Stand Transfers) walker, front-wheeled  -ND     Comment, (Sit-Stand Transfer) from chair, x2 from toilet with cues for use of grab bar  -ND       Row Name 04/12/25 1144          Gait/Stairs (Locomotion)    [place] Level (Gait) minimum assist (75% patient effort);2 person assist;verbal cues;nonverbal cues (demo/gesture)  -ND     Assistive Device (Gait) walker,  front-wheeled  -ND     Patient was able to Ambulate yes  -ND     Distance in Feet (Gait) 15  -ND     Deviations/Abnormal Patterns (Gait) right sided deviations;ataxic;antalgic;ani decreased;gait speed decreased;stride length decreased  -ND     Bilateral Gait Deviations forward flexed posture;heel strike decreased  -ND     Right Sided Gait Deviations weight shift ability decreased  -ND     Comment, (Gait/Stairs) Pt ambulates with step-to gait pattern and toe-touch weight bearing on RLE for comfort. Pt demonstrates intermittent bouts of tremulous ataxic gait with which she reports is her baseline. Cues provided for RWx management, increased stride length, and BUE support for pain management purposes. Overall distance is limited by pain and fatigue.  -ND               User Key  (r) = Recorded By, (t) = Taken By, (c) = Cosigned By      Initials Name Provider Type    Oneida Quiñones, MARY Physical Therapist                   Obj/Interventions       Row Name 04/12/25 1153          Range of Motion Comprehensive    General Range of Motion lower extremity range of motion deficits identified  -ND     Comment, General Range of Motion LLE AROM WFL, RLE AROM limited by pain.  -ND       Row Name 04/12/25 1153          Strength Comprehensive (MMT)    General Manual Muscle Testing (MMT) Assessment lower extremity strength deficits identified  -ND     Comment, General Manual Muscle Testing (MMT) Assessment LLE MMT 4-/5, RLE MMT 3+/5.  -ND       Row Name 04/12/25 1153          Motor Skills    Motor Skills functional endurance  -ND     Functional Endurance Limited by pain and fatigue.  -ND       Row Name 04/12/25 1153          Balance    Balance Assessment sitting static balance;sitting dynamic balance;standing static balance;standing dynamic balance  -ND     Static Sitting Balance standby assist  -ND     Dynamic Sitting Balance standby assist  -ND     Position, Sitting Balance unsupported;sitting in chair  -ND     Static  Standing Balance minimal assist;2-person assist;verbal cues;non-verbal cues (demo/gesture)  -ND     Dynamic Standing Balance minimal assist;2-person assist;verbal cues;non-verbal cues (demo/gesture)  -ND     Position/Device Used, Standing Balance supported;walker, front-wheeled  -ND     Balance Interventions sitting;standing;sit to stand;supported;static;dynamic  -ND     Comment, Balance Anterior LOB  in standing requiring mod-A x2 to stabilize, minimal hip and ankle strategy noted.  -ND       Row Name 04/12/25 1153          Sensory Assessment (Somatosensory)    Sensory Assessment (Somatosensory) LE sensation intact  -ND               User Key  (r) = Recorded By, (t) = Taken By, (c) = Cosigned By      Initials Name Provider Type    ND Oneida Griffith, PT Physical Therapist                   Goals/Plan       Row Name 04/12/25 1156          Bed Mobility Goal 1 (PT)    Activity/Assistive Device (Bed Mobility Goal 1, PT) sit to supine/supine to sit  -ND     [place] Level/Cues Needed (Bed Mobility Goal 1, PT) independent  -ND     Time Frame (Bed Mobility Goal 1, PT) short term goal (STG);5 days  -ND       Row Name 04/12/25 1156          Transfer Goal 1 (PT)    Activity/Assistive Device (Transfer Goal 1, PT) sit-to-stand/stand-to-sit;bed-to-chair/chair-to-bed  -ND     [place] Level/Cues Needed (Transfer Goal 1, PT) modified independence  -ND     Time Frame (Transfer Goal 1, PT) long term goal (LTG);10 days  -ND       Row Name 04/12/25 1156          Gait Training Goal 1 (PT)    Activity/Assistive Device (Gait Training Goal 1, PT) gait (walking locomotion);increase endurance/gait distance;decrease fall risk  -ND     [place] Level (Gait Training Goal 1, PT) modified independence  -ND     Distance (Gait Training Goal 1, PT) 100  -ND     Time Frame (Gait Training Goal 1, PT) long term goal (LTG);10 days  -ND       Row Name 04/12/25 1156          Balance Goal 1 (PT)    Activity/Assistive Device (Balance Goal)  with functional mobility activities  -ND     [place] Level/Cues Needed (Balance Goal 1, PT) modified independence  -ND     Time Frame (Balance Goal 1, PT) long-term goal (LTG);2 weeks  -ND       Row Name 04/12/25 0217          Therapy Assessment/Plan (PT)    Planned Therapy Interventions (PT) balance training;bed mobility training;gait training;home exercise program;patient/family education;transfer training;postural re-education;ROM (range of motion);strengthening  -ND               User Key  (r) = Recorded By, (t) = Taken By, (c) = Cosigned By      Initials Name Provider Type    ND Oneida Griffith, PT Physical Therapist                   Clinical Impression       Row Name 04/12/25 1158          Pain    Pain Location extremity;hip;buttock  -ND     Pain Side/Orientation right;lower  -ND     Pain Management Interventions exercise or physical activity utilized;positioning techniques utilized;activity modification encouraged  -ND     Response to Pain Interventions activity participation with increased pain  -ND       Row Name 04/12/25 115          Plan of Care Review    Plan of Care Reviewed With patient  -ND     Outcome Evaluation PT evaluation completed. Pt presenting with generalized weakness, pain with mobility R>L, impaired balance and sequencing, minor ataxia, and limited activity tolerance warranting IP PT services to address functional deficits and facilitate return to baseline. Recommend IRF following d/c.  -ND       Row Name 04/12/25 5658          Therapy Assessment/Plan (PT)    Patient/Family Therapy Goals Statement (PT) Return to baseline.  -ND     Rehab Potential (PT) fair  -ND     Criteria for Skilled Interventions Met (PT) yes;meets criteria;skilled treatment is necessary  -ND     Therapy Frequency (PT) daily  -ND     Predicted Duration of Therapy Intervention (PT) 10 days  -ND       Row Name 04/12/25 1158          Vital Signs    Pre Systolic BP Rehab 150  -ND     Pre Treatment Diastolic BP 94   -ND     Post Systolic BP Rehab 139  -ND     Post Treatment Diastolic BP 92  -ND     Pretreatment Heart Rate (beats/min) 64  -ND     Posttreatment Heart Rate (beats/min) 65  -ND     Pre SpO2 (%) 93  -ND     O2 Delivery Pre Treatment room air  -ND     O2 Delivery Intra Treatment room air  -ND     Post SpO2 (%) 97  -ND     O2 Delivery Post Treatment room air  -ND     Pre Patient Position Sitting  -ND     Intra Patient Position Standing  -ND     Post Patient Position Sitting  -ND       Row Name 04/12/25 1155          Positioning and Restraints    Pre-Treatment Position sitting in chair/recliner  -ND     Post Treatment Position chair  -ND     In Chair notified nsg;reclined;legs elevated;call light within reach;encouraged to call for assist;exit alarm on;waffle cushion  -ND               User Key  (r) = Recorded By, (t) = Taken By, (c) = Cosigned By      Initials Name Provider Type    Oneida Quiñones, PT Physical Therapist                   Outcome Measures       Row Name 04/12/25 1157          How much help from another person do you currently need...    Turning from your back to your side while in flat bed without using bedrails? 3  -ND     Moving from lying on back to sitting on the side of a flat bed without bedrails? 3  -ND     Moving to and from a bed to a chair (including a wheelchair)? 2  -ND     Standing up from a chair using your arms (e.g., wheelchair, bedside chair)? 2  -ND     Climbing 3-5 steps with a railing? 2  -ND     To walk in hospital room? 2  -ND     AM-PAC 6 Clicks Score (PT) 14  -ND     Highest Level of Mobility Goal 4 --> Transfer to chair/commode  -ND       Row Name 04/12/25 1158 04/12/25 1157       Functional Assessment    Outcome Measure Options AM-PAC 6 Clicks Daily Activity (OT)  -TB AM-PAC 6 Clicks Basic Mobility (PT)  -ND              User Key  (r) = Recorded By, (t) = Taken By, (c) = Cosigned By      Initials Name Provider Type    TB Charo De La Fuente, OT Occupational Therapist     Oneida Quiñones, MARY Physical Therapist                                 Physical Therapy Education       Title: PT OT SLP Therapies (In Progress)       Topic: Physical Therapy (In Progress)       Point: Mobility training (In Progress)       Learning Progress Summary            Patient Acceptance, E, NR by ND at 4/12/2025 1157                      Point: Home exercise program (Not Started)       Learner Progress:  Not documented in this visit.              Point: Body mechanics (In Progress)       Learning Progress Summary            Patient Acceptance, E, NR by ND at 4/12/2025 1157                      Point: Precautions (In Progress)       Learning Progress Summary            Patient Acceptance, E, NR by ND at 4/12/2025 1157                                      User Key       Initials Effective Dates Name Provider Type Discipline    ND 11/16/23 -  Oneida Griffith PT Physical Therapist PT                  PT Recommendation and Plan  Planned Therapy Interventions (PT): balance training, bed mobility training, gait training, home exercise program, patient/family education, transfer training, postural re-education, ROM (range of motion), strengthening  Outcome Evaluation: PT evaluation completed. Pt presenting with generalized weakness, pain with mobility R>L, impaired balance and sequencing, minor ataxia, and limited activity tolerance warranting IP PT services to address functional deficits and facilitate return to baseline. Recommend IRF following d/c.     Time Calculation:   PT Evaluation Complexity  History, PT Evaluation Complexity: 3 or more personal factors and/or comorbidities  Examination of Body Systems (PT Eval Complexity): total of 4 or more elements  Clinical Presentation (PT Evaluation Complexity): evolving  Clinical Decision Making (PT Evaluation Complexity): moderate complexity  Overall Complexity (PT Evaluation Complexity): moderate complexity     PT Charges       Row Name 04/12/25 8724              Time Calculation    Start Time 1038  -ND      PT Received On 04/12/25  -ND      PT Goal Re-Cert Due Date 04/22/25  -ND         Untimed Charges    PT Eval/Re-eval Minutes 51  -ND         Total Minutes    Untimed Charges Total Minutes 51  -ND       Total Minutes 51  -ND                User Key  (r) = Recorded By, (t) = Taken By, (c) = Cosigned By      Initials Name Provider Type    ND Oneida Griffith, PT Physical Therapist                  Therapy Charges for Today       Code Description Service Date Service Provider Modifiers Qty    93935051492 HC PT EVAL MOD COMPLEXITY 4 4/12/2025 Oneida Griffith, PT GP 1            PT G-Codes  Outcome Measure Options: AM-PAC 6 Clicks Daily Activity (OT)  AM-PAC 6 Clicks Score (PT): 14  AM-PAC 6 Clicks Score (OT): 14  PT Discharge Summary  Anticipated Discharge Disposition (PT): inpatient rehabilitation facility    Oneida Griffith, MARY  4/12/2025

## 2025-04-12 NOTE — PLAN OF CARE
Problem: Adult Inpatient Plan of Care  Goal: Plan of Care Review  Recent Flowsheet Documentation  Taken 4/12/2025 1151 by Charo De La Fuente OT  Progress: (IE) --  Outcome Evaluation: OT IE completed. Pt is A/Ox4 and participates in therapy with good effort. BUE AROM, strength, and sensation are WFL for self-care performance. Pt presents below her baseline with generalized weakness, balance impairments, and endurance deficits. Min Ax2 STS. Up in room/bathroom RW support and Min Ax2. Presents with intermittent ataxic gait. Requires significant cuing for sequencing and safety. LOB x1 standing from commode requiring Mod Ax2 to recover. Pt requires assist with all LB ADLs and toileting at this time. OT will follow IP. Recommend IRF at d/c for best outcome if pt is medically appropriate.

## 2025-04-13 LAB
FUNGUS WND CULT: NORMAL
GLUCOSE BLDC GLUCOMTR-MCNC: 134 MG/DL (ref 70–130)
GLUCOSE BLDC GLUCOMTR-MCNC: 143 MG/DL (ref 70–130)
GLUCOSE BLDC GLUCOMTR-MCNC: 205 MG/DL (ref 70–130)
GLUCOSE BLDC GLUCOMTR-MCNC: 215 MG/DL (ref 70–130)
MYCOBACTERIUM SPEC CULT: NORMAL
NIGHT BLUE STAIN TISS: NORMAL

## 2025-04-13 PROCEDURE — G0378 HOSPITAL OBSERVATION PER HR: HCPCS

## 2025-04-13 PROCEDURE — 99231 SBSQ HOSP IP/OBS SF/LOW 25: CPT | Performed by: INTERNAL MEDICINE

## 2025-04-13 PROCEDURE — 82948 REAGENT STRIP/BLOOD GLUCOSE: CPT

## 2025-04-13 PROCEDURE — 63710000001 INSULIN LISPRO (HUMAN) PER 5 UNITS: Performed by: INTERNAL MEDICINE

## 2025-04-13 PROCEDURE — 96372 THER/PROPH/DIAG INJ SC/IM: CPT

## 2025-04-13 PROCEDURE — 97116 GAIT TRAINING THERAPY: CPT

## 2025-04-13 PROCEDURE — 97110 THERAPEUTIC EXERCISES: CPT

## 2025-04-13 PROCEDURE — 25010000002 ENOXAPARIN PER 10 MG: Performed by: INTERNAL MEDICINE

## 2025-04-13 RX ADMIN — LOSARTAN POTASSIUM 100 MG: 50 TABLET, FILM COATED ORAL at 08:27

## 2025-04-13 RX ADMIN — Medication 5 MG: at 21:59

## 2025-04-13 RX ADMIN — FLUOXETINE HYDROCHLORIDE 20 MG: 20 CAPSULE ORAL at 08:27

## 2025-04-13 RX ADMIN — INSULIN LISPRO 3 UNITS: 100 INJECTION, SOLUTION INTRAVENOUS; SUBCUTANEOUS at 12:43

## 2025-04-13 RX ADMIN — Medication 5 MG: at 00:00

## 2025-04-13 RX ADMIN — INSULIN LISPRO 3 UNITS: 100 INJECTION, SOLUTION INTRAVENOUS; SUBCUTANEOUS at 21:50

## 2025-04-13 RX ADMIN — PANTOPRAZOLE SODIUM 40 MG: 40 TABLET, DELAYED RELEASE ORAL at 05:31

## 2025-04-13 RX ADMIN — Medication 5 MG: at 23:43

## 2025-04-13 RX ADMIN — LEVOTHYROXINE SODIUM 100 MCG: 0.1 TABLET ORAL at 05:31

## 2025-04-13 RX ADMIN — Medication 10 ML: at 21:50

## 2025-04-13 RX ADMIN — ATORVASTATIN CALCIUM 40 MG: 40 TABLET, FILM COATED ORAL at 08:27

## 2025-04-13 RX ADMIN — BUPROPION HYDROCHLORIDE 150 MG: 150 TABLET, EXTENDED RELEASE ORAL at 08:27

## 2025-04-13 RX ADMIN — Medication 10 ML: at 08:36

## 2025-04-13 RX ADMIN — ENOXAPARIN SODIUM 40 MG: 100 INJECTION SUBCUTANEOUS at 08:27

## 2025-04-13 RX ADMIN — HYDROCODONE BITARTRATE AND ACETAMINOPHEN 1 TABLET: 5; 325 TABLET ORAL at 21:51

## 2025-04-13 RX ADMIN — ACETAMINOPHEN 650 MG: 325 TABLET, FILM COATED ORAL at 08:27

## 2025-04-13 RX ADMIN — ASPIRIN 81 MG: 81 TABLET, COATED ORAL at 08:27

## 2025-04-13 NOTE — PROGRESS NOTES
[facility] Medicine Services  PROGRESS NOTE    Patient Name: Mary Irving  : 1940  MRN: [id number]    Date of Admission: 4/10/2025  Primary Care Physician: Rosa Isela Bangura MD    Subjective   Subjective     CC:  F/u pelvic Fx    HPI:  Pain is about the same. Worked with therapy yesterday.      Objective   Objective     Vital Signs:   Temp:  [97.7 °F (36.5 °C)-98.2 °F (36.8 °C)] 97.7 °F (36.5 °C)  Heart Rate:  [61-81] 65  Resp:  [16] 16  BP: (131-157)/() 142/96     Physical Exam:  Constitutional: Awake, alert, sitting up in bed in NAD  Respiratory: Clear to auscultation bilaterally, respiratory effort normal   Cardiovascular: RRR, palpable pedal pulses  Gastrointestinal: Positive bowel sounds, soft, nondistended, LLQ ttp  Musculoskeletal: No lower extremity edema  Psychiatric: Appropriate affect, cooperative  Neurologic: Speech clear and fluent    Results Reviewed:  LAB RESULTS:      Lab 25  0420 04/10/25  1931   WBC 8.52 8.90   HEMOGLOBIN 13.4 15.0   HEMATOCRIT 41.5 45.1   PLATELETS 324 364   NEUTROS ABS 6.17 6.51   IMMATURE GRANS (ABS) 0.05 0.02   LYMPHS ABS 1.33 1.52   MONOS ABS 0.84 0.81   EOS ABS 0.11 0.04   MCV 95.6 93.8         Lab 25  0419 04/10/25  193   SODIUM 138 139   POTASSIUM 4.1 4.4   CHLORIDE 105 102   CO2 23.0 24.7   ANION GAP 10.0 12.3   BUN 13 12   CREATININE 0.59 0.49*   EGFR 90.1 94.2   GLUCOSE 147* 112*   CALCIUM 8.6 9.7   TSH 9.540*  --          Lab 04/10/25  1931   TOTAL PROTEIN 7.2   ALBUMIN 4.0   GLOBULIN 3.2   ALT (SGPT) 16   AST (SGOT) 21   BILIRUBIN 0.5   ALK PHOS 177*                     Brief Urine Lab Results  (Last result in the past 365 days)        Color   Clarity   Blood   Leuk Est   Nitrite   Protein   CREAT   Urine HCG        25 1109 Yellow   Clear   Negative   Negative   Negative   Negative                   Microbiology Results Abnormal       None            No radiology results from the last 24  hrs      Results for orders placed during the hospital encounter of 11/10/23    Adult Transthoracic Echo Complete W/ Cont if Necessary Per Protocol    Interpretation Summary    Left ventricular ejection fraction appears to be greater than 70%.  grade 1 diastolic dysfunction    mild aortic valve insuficiency    Estimated right ventricular systolic pressure from tricuspid regurgitation is normal (<35 mmHg).      Current medications:  Scheduled Meds:aspirin, 81 mg, Oral, Daily  atorvastatin, 40 mg, Oral, Daily  buPROPion XL, 150 mg, Oral, Daily  enoxaparin sodium, 40 mg, Subcutaneous, Daily  FLUoxetine, 20 mg, Oral, Daily  insulin lispro, 2-7 Units, Subcutaneous, 4x Daily AC & at Bedtime  levothyroxine, 100 mcg, Oral, Q AM  losartan, 100 mg, Oral, Q24H  pantoprazole, 40 mg, Oral, Q AM  sodium chloride, 10 mL, Intravenous, Q12H      Continuous Infusions:     PRN Meds:.  acetaminophen **OR** acetaminophen **OR** acetaminophen    Calcium Replacement - Follow Nurse / BPA Driven Protocol    dextrose    dextrose    glucagon (human recombinant)    HYDROcodone-acetaminophen    Magnesium Standard Dose Replacement - Follow Nurse / BPA Driven Protocol    melatonin    Phosphorus Replacement - Follow Nurse / BPA Driven Protocol    Potassium Replacement - Follow Nurse / BPA Driven Protocol    sodium chloride    sodium chloride    Assessment & Plan   Assessment & Plan     Active Hospital Problems    Diagnosis  POA    **Closed nondisplaced fracture of pubis [S32.509A]  Yes    Pubic ramus fracture [S32.599A]  Yes    Mixed hyperlipidemia [E78.2]  Yes    Hypothyroidism (acquired) [E03.9]  Yes    Primary hypertension [I10]  Yes      Resolved Hospital Problems   No resolved problems to display.        Brief Hospital Course to date:  Mary Irving is a 84 y.o. female w/ HTN, HLD, hypothyroidism, anxiety, depression, just admitted 4/5/25-4/6/25 w/ RT ACL partial tear and medial meniscal tear, s/p intraarticular steroid injection; at  home she had a ground level fall w/ hip pain; CT of the pelvis showed a nondisplaced RT inferior pubic rami fracture    Assessment/Plan    Closed nondisplaced RT pubic rami Fx 2/2 ground level fall  Recent RT ACL partial tear and medial meniscus tear  -cont prn pain control  -seen by ortho, non-op; WBAT, use a walker, rec DVT ppx for 3-4 weeks  -PT/OT  -anticipate need for short term rehab    DM2, A1c 6.7%, w/o insulin use - SSI  HTN/HLD - asa, statin, losartan  Hypothyroidism - levothyroxine  Anxiety/Depression - wellbutrin, prozac    Expected Discharge Location and Transportation: rehab  Expected Discharge   Expected discharge date/ time has not been documented.     VTE Prophylaxis:  Pharmacologic & mechanical VTE prophylaxis orders are present.         AM-PAC 6 Clicks Score (PT): 16 (04/13/25 7553)    CODE STATUS:   Code Status and Medical Interventions: No CPR (Do Not Attempt to Resuscitate); Limited Support; No intubation (DNI)   Ordered at: 04/10/25 9225     Code Status (Patient has no pulse and is not breathing):    No CPR (Do Not Attempt to Resuscitate)     Medical Interventions (Patient has pulse or is breathing):    Limited Support     Medical Intervention Limits:    No intubation (DNI)       Tejas Camacho,   04/13/25

## 2025-04-13 NOTE — PLAN OF CARE
Goal Outcome Evaluation:           Progress: improving  Outcome Evaluation: Alert and oriented x4. VSS on room air. Minimal complaints of pain; premedicated for activity with tylenol this a.m. Up with min assist x1 and walker. Voids spontaneously.

## 2025-04-13 NOTE — THERAPY TREATMENT NOTE
Patient Name: Mary Irving  : 1940    MRN: [id number]                              Today's Date: 2025       Admit Date: 4/10/2025    Visit Dx:     ICD-10-CM ICD-9-CM   1. Closed fracture of right inferior pubic ramus, initial encounter  S32.591A 808.2     Patient Active Problem List   Diagnosis    Severe obstructive sleep apnea    Primary hypertension    Anxiety associated with depression    Dyslipidemia    Hypothyroidism (acquired)    Degenerative arthritis    Fatigue    Obesity (BMI 30-39.9)    Mild intermittent asthma    Weakness of both lower extremities    Right knee pain    Mixed hyperlipidemia    Primary osteoarthritis of right knee    Fatigue    Acute torn meniscus    Hyponatremia    Closed nondisplaced fracture of pubis    Pubic ramus fracture     Past Medical History:   Diagnosis Date    Acid reflux     Anemia     Arthritis     Arthritis     Asthma     Breast injury     HIT LT BREAST WITH HANDLEBAR AS YOUNG CHILD    Depression     Diabetes     Disease of thyroid gland     History of stress test     20 plus years ago    Hyperlipidemia     Hypertension     Thyroid disorder      Past Surgical History:   Procedure Laterality Date    BREAST BIOPSY Right 2010    CATARACT EXTRACTION, BILATERAL      FOOT SURGERY Bilateral     HYSTERECTOMY      AGE 42    OOPHORECTOMY Bilateral     AGE 42      General Information       Row Name 25 1117          Physical Therapy Time and Intention    Document Type therapy note (daily note)  -ND     Mode of Treatment physical therapy  -ND       Row Name 25 1117          General Information    Patient Profile Reviewed yes  -ND     Existing Precautions/Restrictions fall;other (see comments)  s/p fall with R hip & LE pain, recent R ACL tear, WBAT RLE protected with RW. Ataxia.  -ND     Barriers to Rehab medically complex;previous functional deficit;physical barrier  -ND       Row Name 25 1117          Cognition    Orientation Status (Cognition)  oriented x 4  -ND       Row Name 04/13/25 1117          Safety Issues/Impairments Affecting Functional Mobility    Safety Issues Affecting Function (Mobility) awareness of need for assistance;insight into deficits/self-awareness;safety precaution awareness;sequencing abilities;safety precautions follow-through/compliance;positioning of assistive device;steps too close to assistive device  -ND     Impairments Affecting Function (Mobility) balance;endurance/activity tolerance;pain;strength;postural/trunk control;motor control  -ND               User Key  (r) = Recorded By, (t) = Taken By, (c) = Cosigned By      Initials Name Provider Type    ND Oneida Griffith, PT Physical Therapist                   Mobility       Row Name 04/13/25 1122          Bed Mobility    Bed Mobility supine-sit  -ND     Supine-Sit [place] (Bed Mobility) moderate assist (50% patient effort);1 person assist;verbal cues;nonverbal cues (demo/gesture)  -ND     Assistive Device (Bed Mobility) head of bed elevated;bed rails  -ND     Comment, (Bed Mobility) Increased time and effort for task. Assist at trunk. Denies dizziness.  -ND       Row Name 04/13/25 1122          Sit-Stand Transfer    Sit-Stand [place] (Transfers) minimum assist (75% patient effort);1 person assist;verbal cues;nonverbal cues (demo/gesture)  -ND     Assistive Device (Sit-Stand Transfers) walker, front-wheeled  -ND     Comment, (Sit-Stand Transfer) from EOB, increased time for upright posture.  -ND       Row Name 04/13/25 1122          Gait/Stairs (Locomotion)    [place] Level (Gait) minimum assist (75% patient effort);1 person assist;verbal cues;nonverbal cues (demo/gesture)  -ND     Assistive Device (Gait) walker, front-wheeled  -ND     Patient was able to Ambulate yes  -ND     Distance in Feet (Gait) 22  -ND     Deviations/Abnormal Patterns (Gait) right sided deviations;ataxic;base of support, narrow;ani decreased;gait speed decreased;stride length  decreased  -ND     Bilateral Gait Deviations forward flexed posture;heel strike decreased  -ND     Right Sided Gait Deviations weight shift ability decreased  -ND     Comment, (Gait/Stairs) Pt ambulates with antalgic step-to gait pattern with RWx, min-A, decreased RLE weight shift, and chair follow for safety. Pt initially demonstrating toe touch position of RLE during stance phase but cued pt to place R foot flat on floor and utilize increased BUE for pain mangement. 2 short standing rest breaks during gait bout. Limited by pain and fatigue, returned to room in chair.  -ND       Row Name 04/13/25 1122          Mobility    Extremity Weight-bearing Status right lower extremity  -ND     Right Lower Extremity (Weight-bearing Status) weight-bearing as tolerated (WBAT)  -ND               User Key  (r) = Recorded By, (t) = Taken By, (c) = Cosigned By      Initials Name Provider Type    ND Oneida Griffith PT Physical Therapist                   Obj/Interventions       Row Name 04/13/25 1129          Motor Skills    Therapeutic Exercise hip;ankle;knee  -ND       Row Name 04/13/25 1129          Hip (Therapeutic Exercise)    Hip (Therapeutic Exercise) strengthening exercise  -ND     Hip Strengthening (Therapeutic Exercise) bilateral;marching while seated;aBduction;aDduction;10 repetitions  -ND       Row Name 04/13/25 1129          Knee (Therapeutic Exercise)    Knee (Therapeutic Exercise) strengthening exercise  -ND     Knee Strengthening (Therapeutic Exercise) bilateral;LAQ (long arc quad);10 repetitions  -ND       Row Name 04/13/25 1129          Ankle (Therapeutic Exercise)    Ankle (Therapeutic Exercise) AROM (active range of motion)  -ND     Ankle AROM (Therapeutic Exercise) bilateral;dorsiflexion;plantarflexion;10 repetitions  -ND       Row Name 04/13/25 1129          Balance    Balance Assessment sitting static balance;sitting dynamic balance;standing static balance;standing dynamic balance  -ND     Static Sitting  Balance standby assist  -ND     Dynamic Sitting Balance contact guard  -ND     Position, Sitting Balance unsupported;sitting edge of bed  -ND     Static Standing Balance minimal assist;1-person assist;verbal cues;non-verbal cues (demo/gesture)  -ND     Dynamic Standing Balance minimal assist;1-person assist;verbal cues;non-verbal cues (demo/gesture)  -ND     Position/Device Used, Standing Balance supported;walker, front-wheeled  -ND     Balance Interventions sitting;standing;sit to stand;static;supported;dynamic  -ND     Comment, Balance No overt LOB this date but min-A AAT for stability and weight shifting.  -ND               User Key  (r) = Recorded By, (t) = Taken By, (c) = Cosigned By      Initials Name Provider Type    Oneida Quiñones, PT Physical Therapist                   Goals/Plan    No documentation.                  Clinical Impression       Row Name 04/13/25 1131          Pain    Posttreatment Pain Rating 5/10  -ND     Pain Location extremity  -ND     Pain Side/Orientation right;lower  -ND     Pain Management Interventions premedicated for activity;exercise or physical activity utilized;positioning techniques utilized  -ND     Response to Pain Interventions activity participation with increased pain  -ND       Row Name 04/13/25 1131          Plan of Care Review    Plan of Care Reviewed With patient  -ND     Progress improving  -ND     Outcome Evaluation Pt increased ambulation distance this date and has progressed to mobilizing with min-A x1 plus chair follow for safety. Pt continues to be limited by pain, weakness, and decreased motor planning warranting need for continued IP PT services to address deficits and facilitate increased functional mobility. Recommend IRF following d/c pending medical appropriateness.  -ND       Row Name 04/13/25 1131          Vital Signs    Pre Systolic BP Rehab 157  -ND     Pre Treatment Diastolic BP 94  -ND     Post Systolic BP Rehab 154  -ND     Post Treatment  Diastolic BP 87  -ND     Pretreatment Heart Rate (beats/min) 72  -ND     Posttreatment Heart Rate (beats/min) 73  -ND     Pre SpO2 (%) 94  -ND     O2 Delivery Pre Treatment room air  -ND     O2 Delivery Intra Treatment room air  -ND     Post SpO2 (%) 94  -ND     O2 Delivery Post Treatment room air  -ND     Pre Patient Position Supine  -ND     Intra Patient Position Standing  -ND     Post Patient Position Sitting  -ND       Row Name 04/13/25 1131          Positioning and Restraints    Pre-Treatment Position in bed  -ND     Post Treatment Position chair  -ND     In Chair notified nsg;reclined;legs elevated;call light within reach;encouraged to call for assist;exit alarm on;waffle cushion  -ND               User Key  (r) = Recorded By, (t) = Taken By, (c) = Cosigned By      Initials Name Provider Type    Oneida Quiñones PT Physical Therapist                   Outcome Measures       Row Name 04/13/25 1133          How much help from another person do you currently need...    Turning from your back to your side while in flat bed without using bedrails? 3  -ND     Moving from lying on back to sitting on the side of a flat bed without bedrails? 3  -ND     Moving to and from a bed to a chair (including a wheelchair)? 3  -ND     Standing up from a chair using your arms (e.g., wheelchair, bedside chair)? 3  -ND     Climbing 3-5 steps with a railing? 2  -ND     To walk in hospital room? 2  -ND     AM-PAC 6 Clicks Score (PT) 16  -ND     Highest Level of Mobility Goal 5 --> Static standing  -ND       Row Name 04/13/25 1133          Functional Assessment    Outcome Measure Options AM-PAC 6 Clicks Basic Mobility (PT)  -ND               User Key  (r) = Recorded By, (t) = Taken By, (c) = Cosigned By      Initials Name Provider Type    Oneida Quiñones PT Physical Therapist                                 Physical Therapy Education       Title: PT OT SLP Therapies (In Progress)       Topic: Physical Therapy (In Progress)        Point: Mobility training (In Progress)       Learning Progress Summary            Patient Acceptance, E, NR by ND at 4/13/2025 1133    Acceptance, E, NR by ND at 4/12/2025 1157                      Point: Home exercise program (In Progress)       Learning Progress Summary            Patient Acceptance, E, NR by ND at 4/13/2025 1133                      Point: Body mechanics (In Progress)       Learning Progress Summary            Patient Acceptance, E, NR by ND at 4/13/2025 1133    Acceptance, E, NR by ND at 4/12/2025 1157                      Point: Precautions (In Progress)       Learning Progress Summary            Patient Acceptance, E, NR by ND at 4/13/2025 1133    Acceptance, E, NR by ND at 4/12/2025 1157                                      User Key       Initials Effective Dates Name Provider Type Discipline    ND 11/16/23 -  Oneida Griffith, PT Physical Therapist PT                  PT Recommendation and Plan  Planned Therapy Interventions (PT): balance training, bed mobility training, gait training, home exercise program, patient/family education, transfer training, postural re-education, ROM (range of motion), strengthening  Progress: improving  Outcome Evaluation: Pt increased ambulation distance this date and has progressed to mobilizing with min-A x1 plus chair follow for safety. Pt continues to be limited by pain, weakness, and decreased motor planning warranting need for continued IP PT services to address deficits and facilitate increased functional mobility. Recommend IRF following d/c pending medical appropriateness.     Time Calculation:   PT Evaluation Complexity  History, PT Evaluation Complexity: 3 or more personal factors and/or comorbidities  Examination of Body Systems (PT Eval Complexity): total of 4 or more elements  Clinical Presentation (PT Evaluation Complexity): evolving  Clinical Decision Making (PT Evaluation Complexity): moderate complexity  Overall Complexity (PT  Evaluation Complexity): moderate complexity     PT Charges       Row Name 04/13/25 1133             Time Calculation    Start Time 0949  -ND      PT Received On 04/13/25  -ND         Timed Charges    31803 - PT Therapeutic Exercise Minutes 10  -ND      51101 - Gait Training Minutes  15  -ND         Total Minutes    Timed Charges Total Minutes 25  -ND       Total Minutes 25  -ND                User Key  (r) = Recorded By, (t) = Taken By, (c) = Cosigned By      Initials Name Provider Type    ND Oneida Griffith, PT Physical Therapist                  Therapy Charges for Today       Code Description Service Date Service Provider Modifiers Qty    26921095053 HC PT EVAL MOD COMPLEXITY 4 4/12/2025 Oneida Griffith, PT GP 1    19079809163 HC PT THER PROC EA 15 MIN 4/13/2025 Oneida Griffith, PT GP 1    79042137039 HC GAIT TRAINING EA 15 MIN 4/13/2025 Oneida Griffith, PT GP 1            PT G-Codes  Outcome Measure Options: AM-PAC 6 Clicks Basic Mobility (PT)  AM-PAC 6 Clicks Score (PT): 16  AM-PAC 6 Clicks Score (OT): 14  PT Discharge Summary  Anticipated Discharge Disposition (PT): inpatient rehabilitation facility    Oneida Griffith PT  4/13/2025

## 2025-04-13 NOTE — PLAN OF CARE
Problem: Adult Inpatient Plan of Care  Goal: Patient-Specific Goal (Individualized)  Outcome: Not Progressing  Goal: Readiness for Transition of Care  Outcome: Not Progressing     Problem: Adult Inpatient Plan of Care  Goal: Plan of Care Review  Outcome: Progressing  Goal: Absence of Hospital-Acquired Illness or Injury  Outcome: Progressing  Intervention: Identify and Manage Fall Risk  Recent Flowsheet Documentation  Taken 4/13/2025 0400 by Rose Dillon, DONALD  Safety Promotion/Fall Prevention:   activity supervised   room organization consistent   safety round/check completed   fall prevention program maintained   clutter free environment maintained   gait belt   nonskid shoes/slippers when out of bed  Taken 4/13/2025 0149 by Rose Dillon, DONALD  Safety Promotion/Fall Prevention:   activity supervised   room organization consistent   safety round/check completed   fall prevention program maintained   clutter free environment maintained   gait belt   nonskid shoes/slippers when out of bed  Taken 4/12/2025 2355 by Rose Dillon, DONALD  Safety Promotion/Fall Prevention:   activity supervised   room organization consistent   safety round/check completed   fall prevention program maintained   clutter free environment maintained   gait belt   nonskid shoes/slippers when out of bed  Taken 4/12/2025 2118 by Rose Dillon, RN  Safety Promotion/Fall Prevention:   activity supervised   room organization consistent   safety round/check completed   fall prevention program maintained   clutter free environment maintained   gait belt   nonskid shoes/slippers when out of bed  Taken 4/12/2025 2048 by Rose Dillon, RN  Safety Promotion/Fall Prevention:   activity supervised   room organization consistent   safety round/check completed   fall prevention program maintained   clutter free environment maintained   gait belt   nonskid shoes/slippers when out of bed  Intervention: Prevent Skin Injury  Recent Flowsheet Documentation  Taken  4/13/2025 0400 by Rose Dillon RN  Body Position: position maintained  Skin Protection: incontinence pads utilized  Taken 4/13/2025 0149 by Rose Dillon RN  Body Position: position maintained  Skin Protection: incontinence pads utilized  Taken 4/12/2025 2355 by Rose Dillon RN  Body Position: position maintained  Skin Protection: incontinence pads utilized  Taken 4/12/2025 2118 by Rose Dillon RN  Body Position: position maintained  Skin Protection: incontinence pads utilized  Taken 4/12/2025 2048 by Rose Dillon RN  Body Position: position maintained  Skin Protection: incontinence pads utilized  Intervention: Prevent and Manage VTE (Venous Thromboembolism) Risk  Recent Flowsheet Documentation  Taken 4/13/2025 0149 by Rose Dillon RN  VTE Prevention/Management:   SCDs (sequential compression devices) on   bilateral  Taken 4/12/2025 2118 by Rose Dillon RN  VTE Prevention/Management:   SCDs (sequential compression devices) on   bilateral  Intervention: Prevent Infection  Recent Flowsheet Documentation  Taken 4/13/2025 0400 by Rose Dillon RN  Infection Prevention: environmental surveillance performed  Taken 4/13/2025 0149 by Rose Dillon RN  Infection Prevention: environmental surveillance performed  Taken 4/12/2025 2355 by Rose Dillon RN  Infection Prevention: environmental surveillance performed  Taken 4/12/2025 2118 by Rose Dillon RN  Infection Prevention: environmental surveillance performed  Taken 4/12/2025 2048 by Rose Dillno RN  Infection Prevention: environmental surveillance performed  Goal: Optimal Comfort and Wellbeing  Outcome: Progressing  Intervention: Monitor Pain and Promote Comfort  Recent Flowsheet Documentation  Taken 4/12/2025 2048 by Rose Dillon RN  Pain Management Interventions: pain medication given  Intervention: Provide Person-Centered Care  Recent Flowsheet Documentation  Taken 4/13/2025 0400 by Rose Dillon RN  Trust Relationship/Rapport: care explained  Taken 4/13/2025  0149 by Rose Dillon RN  Trust Relationship/Rapport: care explained  Taken 4/12/2025 2355 by Rose Dillon RN  Trust Relationship/Rapport: care explained  Taken 4/12/2025 2118 by Rose Dillon RN  Trust Relationship/Rapport: care explained     Problem: Fall Injury Risk  Goal: Absence of Fall and Fall-Related Injury  Outcome: Progressing  Intervention: Identify and Manage Contributors  Recent Flowsheet Documentation  Taken 4/13/2025 0400 by Rose Dillon RN  Self-Care Promotion: independence encouraged  Taken 4/13/2025 0149 by Rose Dillon RN  Self-Care Promotion: independence encouraged  Taken 4/12/2025 2355 by Rose Dillon RN  Self-Care Promotion: independence encouraged  Taken 4/12/2025 2118 by Rose Dillon RN  Self-Care Promotion: independence encouraged  Taken 4/12/2025 2048 by Rose Dillon RN  Self-Care Promotion: independence encouraged  Intervention: Promote Injury-Free Environment  Recent Flowsheet Documentation  Taken 4/13/2025 0400 by Rose Dillon RN  Safety Promotion/Fall Prevention:   activity supervised   room organization consistent   safety round/check completed   fall prevention program maintained   clutter free environment maintained   gait belt   nonskid shoes/slippers when out of bed  Taken 4/13/2025 0149 by Rose Dillon RN  Safety Promotion/Fall Prevention:   activity supervised   room organization consistent   safety round/check completed   fall prevention program maintained   clutter free environment maintained   gait belt   nonskid shoes/slippers when out of bed  Taken 4/12/2025 2355 by Rose Dillon RN  Safety Promotion/Fall Prevention:   activity supervised   room organization consistent   safety round/check completed   fall prevention program maintained   clutter free environment maintained   gait belt   nonskid shoes/slippers when out of bed  Taken 4/12/2025 2118 by Rose Dillon RN  Safety Promotion/Fall Prevention:   activity supervised   room organization consistent   safety  round/check completed   fall prevention program maintained   clutter free environment maintained   gait belt   nonskid shoes/slippers when out of bed  Taken 4/12/2025 2048 by Rose Dillon RN  Safety Promotion/Fall Prevention:   activity supervised   room organization consistent   safety round/check completed   fall prevention program maintained   clutter free environment maintained   gait belt   nonskid shoes/slippers when out of bed     Problem: Mobility Impairment  Goal: Optimal Mobility  Outcome: Progressing  Intervention: Optimize Mobility  Recent Flowsheet Documentation  Taken 4/13/2025 0400 by Rose Dillon RN  Activity Management: activity encouraged  Positioning/Transfer Devices:   pillows   in use  Taken 4/13/2025 0149 by Rose Dillon RN  Activity Management: activity encouraged  Positioning/Transfer Devices:   pillows   in use  Taken 4/12/2025 2355 by Rose Dillon RN  Activity Management: activity encouraged  Positioning/Transfer Devices:   pillows   in use  Taken 4/12/2025 2118 by Rose Dillon RN  Activity Management: activity encouraged  Positioning/Transfer Devices:   pillows   in use  Taken 4/12/2025 2048 by Rose Dillon RN  Activity Management: activity encouraged  Positioning/Transfer Devices:   pillows   in use     Problem: Skin Injury Risk Increased  Goal: Skin Health and Integrity  Outcome: Progressing  Intervention: Optimize Skin Protection  Recent Flowsheet Documentation  Taken 4/13/2025 0400 by Rose Dillon RN  Activity Management: activity encouraged  Pressure Reduction Techniques: frequent weight shift encouraged  Head of Bed (HOB) Positioning: HOB elevated  Pressure Reduction Devices: pressure-redistributing mattress utilized  Skin Protection: incontinence pads utilized  Taken 4/13/2025 0149 by Rose Dillon RN  Activity Management: activity encouraged  Pressure Reduction Techniques: frequent weight shift encouraged  Head of Bed (HOB) Positioning: HOB elevated  Pressure Reduction  Devices: pressure-redistributing mattress utilized  Skin Protection: incontinence pads utilized  Taken 4/12/2025 2355 by Rose Dillon RN  Activity Management: activity encouraged  Pressure Reduction Techniques: frequent weight shift encouraged  Head of Bed (HOB) Positioning: HOB elevated  Pressure Reduction Devices: pressure-redistributing mattress utilized  Skin Protection: incontinence pads utilized  Taken 4/12/2025 2118 by Rose Dillon RN  Activity Management: activity encouraged  Pressure Reduction Techniques: frequent weight shift encouraged  Head of Bed (HOB) Positioning: HOB elevated  Pressure Reduction Devices: pressure-redistributing mattress utilized  Skin Protection: incontinence pads utilized  Taken 4/12/2025 2048 by Rose Dillon RN  Activity Management: activity encouraged  Pressure Reduction Techniques: frequent weight shift encouraged  Head of Bed (HOB) Positioning: HOB elevated  Pressure Reduction Devices: pressure-redistributing mattress utilized  Skin Protection: incontinence pads utilized  Intervention: Promote and Optimize Oral Intake  Recent Flowsheet Documentation  Taken 4/13/2025 0400 by Rose Dillon RN  Oral Nutrition Promotion: rest periods promoted  Taken 4/13/2025 0149 by Rose Dillon RN  Oral Nutrition Promotion: rest periods promoted  Taken 4/12/2025 2355 by Rose Dillon RN  Oral Nutrition Promotion: rest periods promoted  Taken 4/12/2025 2118 by Rose Dillon RN  Oral Nutrition Promotion: rest periods promoted  Taken 4/12/2025 2048 by Rose Dillon RN  Oral Nutrition Promotion: rest periods promoted   Goal Outcome Evaluation:

## 2025-04-13 NOTE — PLAN OF CARE
Goal Outcome Evaluation:  Plan of Care Reviewed With: patient        Progress: improving  Outcome Evaluation: Pt increased ambulation distance this date and has progressed to mobilizing with min-A x1 plus chair follow for safety. Pt continues to be limited by pain, weakness, and decreased motor planning warranting need for continued IP PT services to address deficits and facilitate increased functional mobility. Recommend IRF following d/c pending medical appropriateness.    Anticipated Discharge Disposition (PT): inpatient rehabilitation facility

## 2025-04-14 LAB
GLUCOSE BLDC GLUCOMTR-MCNC: 122 MG/DL (ref 70–130)
GLUCOSE BLDC GLUCOMTR-MCNC: 127 MG/DL (ref 70–130)
GLUCOSE BLDC GLUCOMTR-MCNC: 140 MG/DL (ref 70–130)
GLUCOSE BLDC GLUCOMTR-MCNC: 241 MG/DL (ref 70–130)

## 2025-04-14 PROCEDURE — 63710000001 INSULIN LISPRO (HUMAN) PER 5 UNITS: Performed by: INTERNAL MEDICINE

## 2025-04-14 PROCEDURE — G0378 HOSPITAL OBSERVATION PER HR: HCPCS

## 2025-04-14 PROCEDURE — 97116 GAIT TRAINING THERAPY: CPT

## 2025-04-14 PROCEDURE — 99232 SBSQ HOSP IP/OBS MODERATE 35: CPT | Performed by: INTERNAL MEDICINE

## 2025-04-14 PROCEDURE — 25010000002 ENOXAPARIN PER 10 MG: Performed by: INTERNAL MEDICINE

## 2025-04-14 PROCEDURE — 96372 THER/PROPH/DIAG INJ SC/IM: CPT

## 2025-04-14 PROCEDURE — 97530 THERAPEUTIC ACTIVITIES: CPT

## 2025-04-14 PROCEDURE — 97110 THERAPEUTIC EXERCISES: CPT

## 2025-04-14 PROCEDURE — 82948 REAGENT STRIP/BLOOD GLUCOSE: CPT

## 2025-04-14 RX ADMIN — FLUOXETINE HYDROCHLORIDE 20 MG: 20 CAPSULE ORAL at 08:38

## 2025-04-14 RX ADMIN — Medication 10 MG: at 21:33

## 2025-04-14 RX ADMIN — ACETAMINOPHEN 650 MG: 325 TABLET, FILM COATED ORAL at 12:58

## 2025-04-14 RX ADMIN — Medication 10 ML: at 20:12

## 2025-04-14 RX ADMIN — ACETAMINOPHEN 650 MG: 325 TABLET, FILM COATED ORAL at 20:15

## 2025-04-14 RX ADMIN — Medication 10 ML: at 08:40

## 2025-04-14 RX ADMIN — ASPIRIN 81 MG: 81 TABLET, COATED ORAL at 08:37

## 2025-04-14 RX ADMIN — ATORVASTATIN CALCIUM 40 MG: 40 TABLET, FILM COATED ORAL at 08:37

## 2025-04-14 RX ADMIN — INSULIN LISPRO 3 UNITS: 100 INJECTION, SOLUTION INTRAVENOUS; SUBCUTANEOUS at 20:12

## 2025-04-14 RX ADMIN — LEVOTHYROXINE SODIUM 100 MCG: 0.1 TABLET ORAL at 05:44

## 2025-04-14 RX ADMIN — PANTOPRAZOLE SODIUM 40 MG: 40 TABLET, DELAYED RELEASE ORAL at 05:44

## 2025-04-14 RX ADMIN — BUPROPION HYDROCHLORIDE 150 MG: 150 TABLET, EXTENDED RELEASE ORAL at 08:37

## 2025-04-14 RX ADMIN — LOSARTAN POTASSIUM 100 MG: 50 TABLET, FILM COATED ORAL at 08:37

## 2025-04-14 RX ADMIN — ACETAMINOPHEN 650 MG: 325 TABLET, FILM COATED ORAL at 08:45

## 2025-04-14 RX ADMIN — ENOXAPARIN SODIUM 40 MG: 100 INJECTION SUBCUTANEOUS at 08:37

## 2025-04-14 NOTE — PROGRESS NOTES
[facility] Services  PROGRESS NOTE    Patient Name: Mary Irving  : 1940  MRN: [id number]    Date of Admission: 4/10/2025  Primary Care Physician: Rosa Isela Bangura MD    Subjective   Subjective     CC:  F/u pelvic Fx    HPI:  No acute complaints. Pain is tolerable, getting ready to work with therapy. She had a good day yesterday      Objective   Objective     Vital Signs:   Temp:  [96.1 °F (35.6 °C)-98.6 °F (37 °C)] 98 °F (36.7 °C)  Heart Rate:  [65-74] 74  Resp:  [16] 16  BP: ()/(56-81) 125/74     Physical Exam:  Constitutional: Awake, alert, NAD sitting up in bed  Respiratory: Clear to auscultation bilaterally, respiratory effort normal   Cardiovascular: RRR  Gastrointestinal: Positive bowel sounds, soft, nondistended, LLQ ttp  Musculoskeletal: No lower extremity edema  Psychiatric: Appropriate affect, cooperative  Neurologic: Speech clear and fluent    Results Reviewed:  LAB RESULTS:      Lab 25  0420 04/10/25  1931   WBC 8.52 8.90   HEMOGLOBIN 13.4 15.0   HEMATOCRIT 41.5 45.1   PLATELETS 324 364   NEUTROS ABS 6.17 6.51   IMMATURE GRANS (ABS) 0.05 0.02   LYMPHS ABS 1.33 1.52   MONOS ABS 0.84 0.81   EOS ABS 0.11 0.04   MCV 95.6 93.8         Lab 25  0419 04/10/25  193   SODIUM 138 139   POTASSIUM 4.1 4.4   CHLORIDE 105 102   CO2 23.0 24.7   ANION GAP 10.0 12.3   BUN 13 12   CREATININE 0.59 0.49*   EGFR 90.1 94.2   GLUCOSE 147* 112*   CALCIUM 8.6 9.7   TSH 9.540*  --          Lab 04/10/25  1931   TOTAL PROTEIN 7.2   ALBUMIN 4.0   GLOBULIN 3.2   ALT (SGPT) 16   AST (SGOT) 21   BILIRUBIN 0.5   ALK PHOS 177*                     Brief Urine Lab Results  (Last result in the past 365 days)        Color   Clarity   Blood   Leuk Est   Nitrite   Protein   CREAT   Urine HCG        25 1109 Yellow   Clear   Negative   Negative   Negative   Negative                   Microbiology Results Abnormal       None            No radiology results from the last  24 hrs      Results for orders placed during the hospital encounter of 11/10/23    Adult Transthoracic Echo Complete W/ Cont if Necessary Per Protocol    Interpretation Summary    Left ventricular ejection fraction appears to be greater than 70%.  grade 1 diastolic dysfunction    mild aortic valve insuficiency    Estimated right ventricular systolic pressure from tricuspid regurgitation is normal (<35 mmHg).      Current medications:  Scheduled Meds:aspirin, 81 mg, Oral, Daily  atorvastatin, 40 mg, Oral, Daily  buPROPion XL, 150 mg, Oral, Daily  enoxaparin sodium, 40 mg, Subcutaneous, Daily  FLUoxetine, 20 mg, Oral, Daily  insulin lispro, 2-7 Units, Subcutaneous, 4x Daily AC & at Bedtime  levothyroxine, 100 mcg, Oral, Q AM  losartan, 100 mg, Oral, Q24H  pantoprazole, 40 mg, Oral, Q AM  sodium chloride, 10 mL, Intravenous, Q12H      Continuous Infusions:     PRN Meds:.  acetaminophen **OR** acetaminophen **OR** acetaminophen    Calcium Replacement - Follow Nurse / BPA Driven Protocol    dextrose    dextrose    glucagon (human recombinant)    HYDROcodone-acetaminophen    Magnesium Standard Dose Replacement - Follow Nurse / BPA Driven Protocol    melatonin    Phosphorus Replacement - Follow Nurse / BPA Driven Protocol    Potassium Replacement - Follow Nurse / BPA Driven Protocol    sodium chloride    sodium chloride    Assessment & Plan   Assessment & Plan     Active Hospital Problems    Diagnosis  POA    **Closed nondisplaced fracture of pubis [S32.509A]  Yes    Pubic ramus fracture [S32.599A]  Yes    Mixed hyperlipidemia [E78.2]  Yes    Hypothyroidism (acquired) [E03.9]  Yes    Primary hypertension [I10]  Yes      Resolved Hospital Problems   No resolved problems to display.        Brief Hospital Course to date:  Mary Irving is a 84 y.o. female w/ HTN, HLD, hypothyroidism, anxiety, depression, just admitted 4/5/25-4/6/25 w/ RT ACL partial tear and medial meniscal tear, s/p intraarticular steroid injection;  at home she had a ground level fall w/ hip pain; CT of the pelvis showed a nondisplaced RT inferior pubic rami fracture    Assessment/Plan    Closed nondisplaced RT pubic rami Fx 2/2 ground level fall  Recent RT ACL partial tear and medial meniscus tear  -cont prn pain control  -seen by ortho, non-op; WBAT, use a walker, rec DVT ppx for 3-4 weeks  -currently on lovenox for ppx, depending on rehab facility could consider the same vs poss low dose eliquis or BID ASA 81mg at discharge  -PT/OT  -anticipate need for short term rehab    DM2, A1c 6.7%, w/o insulin use - SSI  HTN/HLD - asa, statin, losartan  Hypothyroidism - levothyroxine  Anxiety/Depression - wellbutrin, prozac    Expected Discharge Location and Transportation: rehab  Expected Discharge   Expected discharge date/ time has not been documented.     VTE Prophylaxis:  Pharmacologic & mechanical VTE prophylaxis orders are present.         AM-PAC 6 Clicks Score (PT): 17 (04/14/25 1143)    CODE STATUS:   Code Status and Medical Interventions: No CPR (Do Not Attempt to Resuscitate); Limited Support; No intubation (DNI)   Ordered at: 04/10/25 1869     Code Status (Patient has no pulse and is not breathing):    No CPR (Do Not Attempt to Resuscitate)     Medical Interventions (Patient has pulse or is breathing):    Limited Support     Medical Intervention Limits:    No intubation (DNI)       Tejas Camacho, DO  04/14/25

## 2025-04-14 NOTE — PLAN OF CARE
Goal Outcome Evaluation:  Plan of Care Reviewed With: patient, child        Progress: improving  Outcome Evaluation: Pt continues to demonstrate good progress towards goals this date. Pt increased her ambulation to 30ft w/ minAx1, FWW, and a close chair follow. Pt continues to demonstrate decrease weight acceptance on RLE, slowed gait speed, and intermittent ataxic gait. No overt LOB or buckling noted however pt limited by RLE weakness and pain. Continue to progress poc as able.    Anticipated Discharge Disposition (PT): inpatient rehabilitation facility

## 2025-04-14 NOTE — THERAPY TREATMENT NOTE
Patient Name: Mary Irving  : 1940    MRN: [id number]                              Today's Date: 2025       Admit Date: 4/10/2025    Visit Dx:     ICD-10-CM ICD-9-CM   1. Closed fracture of right inferior pubic ramus, initial encounter  S32.591A 808.2     Patient Active Problem List   Diagnosis    Severe obstructive sleep apnea    Primary hypertension    Anxiety associated with depression    Dyslipidemia    Hypothyroidism (acquired)    Degenerative arthritis    Fatigue    Obesity (BMI 30-39.9)    Mild intermittent asthma    Weakness of both lower extremities    Right knee pain    Mixed hyperlipidemia    Primary osteoarthritis of right knee    Fatigue    Acute torn meniscus    Hyponatremia    Closed nondisplaced fracture of pubis    Pubic ramus fracture     Past Medical History:   Diagnosis Date    Acid reflux     Anemia     Arthritis     Arthritis     Asthma     Breast injury     HIT LT BREAST WITH HANDLEBAR AS YOUNG CHILD    Depression     Diabetes     Disease of thyroid gland     History of stress test     20 plus years ago    Hyperlipidemia     Hypertension     Thyroid disorder      Past Surgical History:   Procedure Laterality Date    BREAST BIOPSY Right 2010    CATARACT EXTRACTION, BILATERAL      FOOT SURGERY Bilateral     HYSTERECTOMY      AGE 42    OOPHORECTOMY Bilateral     AGE 42      General Information       Row Name 25 1117          Physical Therapy Time and Intention    Document Type therapy note (daily note)  -AC     Mode of Treatment physical therapy  -AC       Row Name 25 1117          General Information    Patient Profile Reviewed yes  -AC     Existing Precautions/Restrictions fall;other (see comments)  s/p fall with R hip & LE pain, recent R ACL tear, WBAT RLE protected with RW. Ataxia  -AC     Barriers to Rehab medically complex;previous functional deficit;physical barrier  -AC       Row Name 25 1117          Cognition    Orientation Status (Cognition)  oriented x 4  -       Row Name 04/14/25 1117          Safety Issues/Impairments Affecting Functional Mobility    Safety Issues Affecting Function (Mobility) awareness of need for assistance;insight into deficits/self-awareness;safety precaution awareness;sequencing abilities;safety precautions follow-through/compliance  -     Impairments Affecting Function (Mobility) balance;endurance/activity tolerance;pain;strength;postural/trunk control;motor control  -               User Key  (r) = Recorded By, (t) = Taken By, (c) = Cosigned By      Initials Name Provider Type     Loretta Santiago, PT Physical Therapist                   Mobility       Row Name 04/14/25 1134          Bed Mobility    Bed Mobility supine-sit  -     Supine-Sit [place] (Bed Mobility) contact guard;1 person assist;verbal cues;nonverbal cues (demo/gesture)  -     Assistive Device (Bed Mobility) head of bed elevated;bed rails  -     Comment, (Bed Mobility) Pt transitioned to sitting EOB w/ less assistance this date compared to previous session  -       Row Name 04/14/25 1134          Transfers    Comment, (Transfers) VC for hand placement and sequencing  -       Row Name 04/14/25 1134          Sit-Stand Transfer    Sit-Stand [place] (Transfers) contact guard;1 person assist;verbal cues;nonverbal cues (demo/gesture)  -     Assistive Device (Sit-Stand Transfers) walker, front-wheeled  -       Row Name 04/14/25 1134          Gait/Stairs (Locomotion)    [place] Level (Gait) minimum assist (75% patient effort);1 person assist;verbal cues;nonverbal cues (demo/gesture)  -     Assistive Device (Gait) walker, front-wheeled  -     Patient was able to Ambulate yes  -AC     Distance in Feet (Gait) 30  -AC     Deviations/Abnormal Patterns (Gait) right sided deviations;ataxic;base of support, narrow;ani decreased;gait speed decreased;stride length decreased  -     Bilateral Gait Deviations forward flexed posture;heel strike  decreased  -AC     Right Sided Gait Deviations weight shift ability decreased  -AC     Comment, (Gait/Stairs) Pt increased her gait distance to 30ft w/ minAx1, FWW, and a step to gait pattern. Pt demonstrated heavy reliance on FWW and decreased weight acceptance on RLE. In addition pt continues to demonstrate intermittent ataxic gait and requires several standing rest breaks. No overt LOB or buckling noted. Gait distance limited by RLE weakness and pain.  -[facility] Name 04/14/25 1134          Mobility    Extremity Weight-bearing Status right lower extremity  -     Right Lower Extremity (Weight-bearing Status) weight-bearing as tolerated (WBAT);full weight-bearing (FWB)  -               User Key  (r) = Recorded By, (t) = Taken By, (c) = Cosigned By      Initials Name Provider Type    AC Loretta Santiago, PT Physical Therapist                   Obj/Interventions       [facility] Name 04/14/25 1139          Motor Skills    Therapeutic Exercise hip;knee;ankle  -[facility] Name 04/14/25 1139          Hip (Therapeutic Exercise)    Hip (Therapeutic Exercise) strengthening exercise  -     Hip Strengthening (Therapeutic Exercise) bilateral;aBduction;aDduction;10 repetitions;heel slides  -[facility] Name 04/14/25 1139          Knee (Therapeutic Exercise)    Knee (Therapeutic Exercise) strengthening exercise  -     Knee Strengthening (Therapeutic Exercise) bilateral;SAQ (short arc quad);10 repetitions  -[facility] Name 04/14/25 1139          Ankle (Therapeutic Exercise)    Ankle (Therapeutic Exercise) AROM (active range of motion)  -     Ankle AROM (Therapeutic Exercise) bilateral;dorsiflexion;plantarflexion;10 repetitions  -[facility] Name 04/14/25 1139          Balance    Balance Assessment sitting static balance;sitting dynamic balance;standing static balance;standing dynamic balance  -     Static Sitting Balance standby assist  -     Dynamic Sitting Balance standby assist  -     Position, Sitting Balance  unsupported;sitting edge of bed  -AC     Static Standing Balance contact guard;1-person assist  -AC     Dynamic Standing Balance minimal assist;1-person assist  -AC     Position/Device Used, Standing Balance supported;walker, front-wheeled  -     Balance Interventions standing;sit to stand;sitting;supported;static;dynamic  -AC               User Key  (r) = Recorded By, (t) = Taken By, (c) = Cosigned By      Initials Name Provider Type    AC Loretta Santiago, PT Physical Therapist                   Goals/Plan    No documentation.                  Clinical Impression       Row Name 04/14/25 1140          Pain    Pretreatment Pain Rating 5/10  -     Posttreatment Pain Rating 5/10  -AC     Pain Location extremity  -     Pain Side/Orientation right;lower  -AC     Pain Management Interventions activity modification encouraged;exercise or physical activity utilized;positioning techniques utilized  -     Response to Pain Interventions activity participation with tolerable pain  -       Row Name 04/14/25 1140          Plan of Care Review    Plan of Care Reviewed With patient;child  -     Progress improving  -     Outcome Evaluation Pt continues to demonstrate good progress towards goals this date. Pt increased her ambulation to 30ft w/ minAx1, FWW, and a close chair follow. Pt continues to demonstrate decrease weight acceptance on RLE, slowed gait speed, and intermittent ataxic gait. No overt LOB or buckling noted however pt limited by RLE weakness and pain. Continue to progress poc as able.  -       Row Name 04/14/25 1140          Therapy Assessment/Plan (PT)    Rehab Potential (PT) fair  -     Criteria for Skilled Interventions Met (PT) yes;meets criteria;skilled treatment is necessary  -     Therapy Frequency (PT) daily  -       Row Name 04/14/25 1140          Vital Signs    O2 Delivery Pre Treatment room air  -AC     O2 Delivery Intra Treatment room air  -AC     O2 Delivery Post Treatment room air  -AC      Pre Patient Position Supine  -AC     Intra Patient Position Standing  -AC     Post Patient Position Sitting  -AC       Row Name 04/14/25 1140          Positioning and Restraints    Pre-Treatment Position in bed  -AC     Post Treatment Position chair  -AC     In Chair notified nsg;reclined;sitting;call light within reach;encouraged to call for assist;exit alarm on;waffle cushion;legs elevated;with family/caregiver  -AC               User Key  (r) = Recorded By, (t) = Taken By, (c) = Cosigned By      Initials Name Provider Type    AC Loretta Santiago, MARY Physical Therapist                   Outcome Measures       Row Name 04/14/25 1143          How much help from another person do you currently need...    Turning from your back to your side while in flat bed without using bedrails? 3  -AC     Moving from lying on back to sitting on the side of a flat bed without bedrails? 3  -AC     Moving to and from a bed to a chair (including a wheelchair)? 3  -AC     Standing up from a chair using your arms (e.g., wheelchair, bedside chair)? 3  -AC     Climbing 3-5 steps with a railing? 2  -AC     To walk in hospital room? 3  -AC     AM-PAC 6 Clicks Score (PT) 17  -AC     Highest Level of Mobility Goal 5 --> Static standing  -AC       Row Name 04/14/25 1143          Functional Assessment    Outcome Measure Options AM-PAC 6 Clicks Basic Mobility (PT)  -               User Key  (r) = Recorded By, (t) = Taken By, (c) = Cosigned By      Initials Name Provider Type    Loretta Seymour PT Physical Therapist                                 Physical Therapy Education       Title: PT OT SLP Therapies (Done)       Topic: Physical Therapy (Done)       Point: Mobility training (Done)       Learning Progress Summary            Patient Acceptance, E, VU by FRIDA at 4/14/2025 1143    Acceptance, E, NR by ND at 4/13/2025 1133    Acceptance, E, VU by PAXTON at 4/13/2025 0830    Acceptance, E, NR by ND at 4/12/2025 1157                      Point: Home  exercise program (Done)       Learning Progress Summary            Patient Acceptance, E, VU by AC at 4/14/2025 1143    Acceptance, E, NR by ND at 4/13/2025 1133    Acceptance, E, VU by EA at 4/13/2025 0830                      Point: Body mechanics (Done)       Learning Progress Summary            Patient Acceptance, E, VU by AC at 4/14/2025 1143    Acceptance, E, NR by ND at 4/13/2025 1133    Acceptance, E, VU by EA at 4/13/2025 0830    Acceptance, E, NR by ND at 4/12/2025 1157                      Point: Precautions (Done)       Learning Progress Summary            Patient Acceptance, E, VU by AC at 4/14/2025 1143    Acceptance, E, NR by ND at 4/13/2025 1133    Acceptance, E, VU by EA at 4/13/2025 0830    Acceptance, E, NR by ND at 4/12/2025 1157                                      User Key       Initials Effective Dates Name Provider Type Discipline     01/06/25 -  Sayra Burt, RN Registered Nurse Nurse    ND 11/16/23 -  Oneida Griffith, PT Physical Therapist PT     07/11/24 -  oLretta Santiago, PT Physical Therapist PT                  PT Recommendation and Plan     Progress: improving  Outcome Evaluation: Pt continues to demonstrate good progress towards goals this date. Pt increased her ambulation to 30ft w/ minAx1, FWW, and a close chair follow. Pt continues to demonstrate decrease weight acceptance on RLE, slowed gait speed, and intermittent ataxic gait. No overt LOB or buckling noted however pt limited by RLE weakness and pain. Continue to progress poc as able.     Time Calculation:         PT Charges       Row Name 04/14/25 1143             Time Calculation    Start Time 1040  -      PT Received On 04/14/25  -      PT Goal Re-Cert Due Date 04/22/25  -         Time Calculation- PT    Total Timed Code Minutes- PT 39 minute(s)  -         Timed Charges    32861 - PT Therapeutic Exercise Minutes 14 -AC      15649 - Gait Training Minutes  14 -      44246 - PT Therapeutic Activity Minutes 11 -          Total Minutes    Timed Charges Total Minutes 39  -AC       Total Minutes 39  -AC                User Key  (r) = Recorded By, (t) = Taken By, (c) = Cosigned By      Initials Name Provider Type    AC Loretta Santiago, PT Physical Therapist                  Therapy Charges for Today       Code Description Service Date Service Provider Modifiers Qty    15893051735 HC PT THER PROC EA 15 MIN 4/14/2025 Loretta Santiago, PT GP 1    60426368809 HC GAIT TRAINING EA 15 MIN 4/14/2025 Loretta Santiago, PT GP 1    [id number]  PT THERAPEUTIC ACT EA 15 MIN 4/14/2025 Loretta Santiago, PT GP 1            PT G-Codes  Outcome Measure Options: AM-PAC 6 Clicks Basic Mobility (PT)  AM-PAC 6 Clicks Score (PT): 17  AM-PAC 6 Clicks Score (OT): 14  PT Discharge Summary  Anticipated Discharge Disposition (PT): inpatient rehabilitation facility    Loretta Santiago PT  4/14/2025

## 2025-04-14 NOTE — CASE MANAGEMENT/SOCIAL WORK
Continued Stay Note  [facility]     Patient Name: Mary Irving  MRN: [id number]  Today's Date: 4/14/2025    Admit Date: 4/10/2025    Plan: [facility]   Discharge Plan       Row Name 04/14/25 0945       Plan    Plan [facility]    Patient/Family in Agreement with Plan yes    Plan Comments  spoke with Ms. Irving, at the bedside. PT/OT are recommending Inpatient Rehab. Pt would like a referral to [facility]. CM spoke with Anel/[facility]. She will review pt's chart for Acute rehab. Pt states she will need wheelchair transport, at discharge.  will continue to follow.    CM received a phone call from [facility]/Anel. She states that their doctor approved pt for Acute rehab. They will likely have a bed ready on Tuesday. CM will follow up with Anel, Tuesday morning. Pt is scheduled for the [facility] wheelchair van for Tuesday.    Final Discharge Disposition Code 62 - inpatient rehab facility                   Discharge Codes    No documentation.                       Jaimee Iraheta RN

## 2025-04-14 NOTE — PLAN OF CARE
Goal Outcome Evaluation:           Progress: improving  Outcome Evaluation: Alert and oriented x4. VSS on room air. Minimal complaints of pain managed by tylenol. Up with min assist x1 and walker. Voids spontaneously

## 2025-04-14 NOTE — PLAN OF CARE
Goal Outcome Evaluation:           Progress: no change     No significant events overnight. Patient rested well. A&Ox4 on RA. VSS. Patient up with assist x1 with gait belt and walker. Skin interventions in place. BM this shift.

## 2025-04-15 VITALS
HEIGHT: 57 IN | RESPIRATION RATE: 16 BRPM | SYSTOLIC BLOOD PRESSURE: 123 MMHG | TEMPERATURE: 98 F | OXYGEN SATURATION: 94 % | WEIGHT: 117.4 LBS | DIASTOLIC BLOOD PRESSURE: 72 MMHG | HEART RATE: 76 BPM | BODY MASS INDEX: 25.33 KG/M2

## 2025-04-15 LAB
GLUCOSE BLDC GLUCOMTR-MCNC: 112 MG/DL (ref 70–130)
GLUCOSE BLDC GLUCOMTR-MCNC: 132 MG/DL (ref 70–130)
GLUCOSE BLDC GLUCOMTR-MCNC: 170 MG/DL (ref 70–130)

## 2025-04-15 PROCEDURE — 82948 REAGENT STRIP/BLOOD GLUCOSE: CPT

## 2025-04-15 PROCEDURE — 99239 HOSP IP/OBS DSCHRG MGMT >30: CPT | Performed by: NURSE PRACTITIONER

## 2025-04-15 PROCEDURE — G0378 HOSPITAL OBSERVATION PER HR: HCPCS

## 2025-04-15 PROCEDURE — 63710000001 INSULIN LISPRO (HUMAN) PER 5 UNITS: Performed by: INTERNAL MEDICINE

## 2025-04-15 PROCEDURE — 96372 THER/PROPH/DIAG INJ SC/IM: CPT

## 2025-04-15 PROCEDURE — 25010000002 ENOXAPARIN PER 10 MG: Performed by: INTERNAL MEDICINE

## 2025-04-15 RX ORDER — HYDROCODONE BITARTRATE AND ACETAMINOPHEN 5; 325 MG/1; MG/1
1 TABLET ORAL EVERY 4 HOURS PRN
Start: 2025-04-15

## 2025-04-15 RX ORDER — LEVOTHYROXINE SODIUM 100 UG/1
100 TABLET ORAL
Start: 2025-04-16

## 2025-04-15 RX ORDER — ACETAMINOPHEN 325 MG/1
650 TABLET ORAL EVERY 4 HOURS PRN
Start: 2025-04-15

## 2025-04-15 RX ORDER — ENOXAPARIN SODIUM 100 MG/ML
40 INJECTION SUBCUTANEOUS DAILY
Start: 2025-04-16 | End: 2025-05-07

## 2025-04-15 RX ORDER — INSULIN LISPRO 100 [IU]/ML
2-7 INJECTION, SOLUTION INTRAVENOUS; SUBCUTANEOUS
Start: 2025-04-15

## 2025-04-15 RX ADMIN — BUPROPION HYDROCHLORIDE 150 MG: 150 TABLET, EXTENDED RELEASE ORAL at 08:30

## 2025-04-15 RX ADMIN — ATORVASTATIN CALCIUM 40 MG: 40 TABLET, FILM COATED ORAL at 08:30

## 2025-04-15 RX ADMIN — PANTOPRAZOLE SODIUM 40 MG: 40 TABLET, DELAYED RELEASE ORAL at 05:36

## 2025-04-15 RX ADMIN — LOSARTAN POTASSIUM 100 MG: 50 TABLET, FILM COATED ORAL at 08:30

## 2025-04-15 RX ADMIN — INSULIN LISPRO 2 UNITS: 100 INJECTION, SOLUTION INTRAVENOUS; SUBCUTANEOUS at 11:46

## 2025-04-15 RX ADMIN — ENOXAPARIN SODIUM 40 MG: 100 INJECTION SUBCUTANEOUS at 08:29

## 2025-04-15 RX ADMIN — FLUOXETINE HYDROCHLORIDE 20 MG: 20 CAPSULE ORAL at 08:30

## 2025-04-15 RX ADMIN — LEVOTHYROXINE SODIUM 100 MCG: 0.1 TABLET ORAL at 05:36

## 2025-04-15 RX ADMIN — ASPIRIN 81 MG: 81 TABLET, COATED ORAL at 08:30

## 2025-04-15 NOTE — DISCHARGE SUMMARY
[facility] Medicine Services  TRANSFER SUMMARY    Patient Name: Mary Irving  : 1940  MRN: [id number]    Date of Admission: 4/10/2025  Date of Discharge:  04/15/2025  Length of Stay: 1  Primary Care Physician: Rosa Isela Bangura MD    Consults       Date and Time Order Name Status Description    4/10/2025 10:31 PM Inpatient Orthopedic Surgery Consult Completed     2025  9:24 PM Inpatient Orthopedic Surgery Consult Completed             Hospital Course     Presenting Problem:   Pubic ramus fracture [S32.599A]    Active Hospital Problems    Diagnosis  POA    **Closed nondisplaced fracture of pubis [S32.509A]  Yes    Pubic ramus fracture [S32.599A]  Yes    Mixed hyperlipidemia [E78.2]  Yes    Hypothyroidism (acquired) [E03.9]  Yes    Primary hypertension [I10]  Yes      Resolved Hospital Problems   No resolved problems to display.          Hospital Course:  Mary Irving is a 84 y.o. female w/ HTN, HLD, hypothyroidism, anxiety, depression, just admitted 25-25 w/ RT ACL partial tear and medial meniscal tear, s/p intraarticular steroid injection; at home she had a ground level fall w/ hip pain; CT of the pelvis showed a nondisplaced RT inferior pubic rami fracture     Closed nondisplaced Rt pubic rami Fx 2/2 ground level fall  Recent Rt ACL partial tear and medial meniscus tear (nonsurgical)  -cont prn pain control  -seen by ortho, non-op; WBAT, use a walkerDr. Peralta with orthopedics recs DVT ppx for 3-4 weeks  -currently on lovenox for ppx, depending on rehab facility could consider the same vs poss low dose eliquis or BID ASA 81mg at discharge  -PT/OT following.    -to [facility] for rehab today      DM2, A1c 6.7%, w/o insulin use - SSI    HTN/HLD - asa, statin, losartan    Hypothyroidism   - TSH 9.54  - levothyroxine was increased to 100 mcg this admission.  Continue.  - Follow-up PCP.  Recommend recheck TSH 4-6 weeks    Anxiety/Depression - wellbutrin, prozac,  "stable        Patient was seen resting up in bed in no acute distress.  Hemodynamically stable and afebrile.  Pain controlled on current medication regimen.  Tolerating diet.  No nausea or vomiting.  Eager to get to rehab today as planned.  Has been cleared by all services for transfer.    Discharge Follow Up Recommendations for outpatient labs/diagnostics:  Patient is cleared for transfer to rehab today bowel services.  Going on medications as below with follow-ups as noted.    --To be seen by provider at [facility] on arrival, PCP 1 week of discharge  -- No follow-up required with orthopedics at this time.  Follow-up \"as needed\".    Day of Discharge     HPI:   Patient was seen resting up in bed in no acute distress.  Awake and alert.  No acute distress.  Tolerating diet.  Denies nausea or vomiting.  Rates pelvic pain 1/10 scale at rest but is much as 8/10 scale with movement.  Eager to get to rehab today.  No new issues expressed.    Review of Systems   Gen- No fevers, chills  CV- No chest pain, palpitations  Resp- No cough, dyspnea  GI- No N/V/D, abd pain    Vital Signs:   Temp:  [96.1 °F (35.6 °C)-98 °F (36.7 °C)] 98 °F (36.7 °C)  Heart Rate:  [67-76] 76  Resp:  [16] 16  BP: (123-151)/(72-87) 123/72     Physical Exam:  Constitutional: No acute distress, awake, alert.  Resting up in bed.  HENT: NCAT, mucous membranes moist  Respiratory: Clear to auscultation bilaterally, respiratory effort normal on room air.  Sats WNL.  Cardiovascular: RRR, no murmurs, rubs, or gallops  Gastrointestinal: Positive bowel sounds, soft, nontender, nondistended  Musculoskeletal: No bilateral ankle edema.  TEJEDA spontaneously.  Psychiatric: Appropriate affect, cooperative  Neurologic: Oriented x 3, nonfocal, speech clear and appropriate.  Follows commands.  Skin: No rashes      Pertinent Results     Results from last 7 days   Lab Units 04/11/25  0420 04/11/25  0419 04/10/25  1931   WBC 10*3/mm3 8.52  --  8.90   HEMOGLOBIN g/dL 13.4  --  15.0 " "  HEMATOCRIT % 41.5  --  45.1   PLATELETS 10*3/mm3 324  --  364   SODIUM mmol/L  --  138 139   POTASSIUM mmol/L  --  4.1 4.4   CHLORIDE mmol/L  --  105 102   CO2 mmol/L  --  23.0 24.7   BUN mg/dL  --  13 12   CREATININE mg/dL  --  0.59 0.49*   GLUCOSE mg/dL  --  147* 112*   CALCIUM mg/dL  --  8.6 9.7     Results from last 7 days   Lab Units 04/10/25  1931   BILIRUBIN mg/dL 0.5   ALK PHOS U/L 177*   ALT (SGPT) U/L 16   AST (SGOT) U/L 21           Invalid input(s): \"TG\", \"LDLCALC\", \"LDLREALC\"  Results from last 7 days   Lab Units 04/11/25  0419   TSH uIU/mL 9.540*     Brief Urine Lab Results  (Last result in the past 365 days)        Color   Clarity   Blood   Leuk Est   Nitrite   Protein   CREAT   Urine HCG        04/05/25 1109 Yellow   Clear   Negative   Negative   Negative   Negative                   Microbiology Results Abnormal       None            Imaging Results (All)       Procedure Component Value Units Date/Time    CT Pelvis Without Contrast [[id number]] Collected: 04/10/25 1511     Updated: 04/10/25 1524    Narrative:      CT PELVIS WO CONTRAST    Date of Exam: 4/10/2025 2:41 PM EDT    Indication: Fall, right buttock pain.    Comparison: CT pelvis performed on 7/9/2023.    Technique: Axial CT images were obtained of the pelvis without contrast administration.  Reconstructed coronal and sagittal images were also obtained. Automated exposure control and iterative construction methods were used.      Findings:  There is a nondisplaced fracture through the right inferior pubic ramus. There are no additional acute bony abnormalities. The patient's right hip arthroplasty is intact. There is narrowing, subchondral sclerosis, and spurring of the left hip joint,   pubic symphysis, and sacroiliac joints indicating arthritic changes. There are degenerative changes within the lower lumbar spine. There are atherosclerotic vascular calcifications in the pelvis. There is increased stool burden. The uterus is surgically "   absent. There is no soft tissue hematoma.      Impression:      Impression:  1.Nondisplaced fracture through the right inferior pubic ramus.  2.The patient's right hip arthroplasty is intact.  3.Degenerative changes.  4.Additional findings as noted above.        Electronically Signed: Jose Rivera MD    4/10/2025 3:21 PM EDT    Workstation ID: [id number]            Results for orders placed during the hospital encounter of 11/10/23    Adult Transthoracic Echo Complete W/ Cont if Necessary Per Protocol    Interpretation Summary    Left ventricular ejection fraction appears to be greater than 70%.  grade 1 diastolic dysfunction    mild aortic valve insuficiency    Estimated right ventricular systolic pressure from tricuspid regurgitation is normal (<35 mmHg).          Discharge Details        Discharge Medications        New Medications        Instructions Start Date   acetaminophen 325 MG tablet  Commonly known as: TYLENOL   650 mg, Oral, Every 4 Hours PRN      enoxaparin sodium 40 MG/0.4ML solution prefilled syringe syringe  Commonly known as: LOVENOX   40 mg, Subcutaneous, Daily, DVT ppx to a total of 3-4 weeks per orthopedics recs then can dc   Start Date: April 16, 2025     HYDROcodone-acetaminophen 5-325 MG per tablet  Commonly known as: NORCO   1 tablet, Oral, Every 4 Hours PRN      Insulin Lispro 100 UNIT/ML injection  Commonly known as: humaLOG   2-7 Units, Subcutaneous, 4 Times Daily Before Meals & Nightly             Changes to Medications        Instructions Start Date   levothyroxine 100 MCG tablet  Commonly known as: SYNTHROID, LEVOTHROID  What changed:   medication strength  how to take this  when to take this   100 mcg, Oral, Every Early Morning   Start Date: April 16, 2025            Continue These Medications        Instructions Start Date   armodafinil 150 MG tablet  Commonly known as: Nuvigil   150 mg, Oral, Daily      aspirin 81 MG EC tablet   81 mg, Daily      atorvastatin 40 MG tablet  Commonly  known as: LIPITOR   40 mg, Daily      buPROPion  MG 24 hr tablet  Commonly known as: WELLBUTRIN XL   150 mg, Daily      Calcium 250 MG capsule   Daily      FLUoxetine 20 MG capsule  Commonly known as: PROzac   20 mg, Oral, Daily      IRON (FERROUS GLUCONATE) PO   Take  by mouth.      losartan 100 MG tablet  Commonly known as: COZAAR   100 mg, Oral, Every 24 Hours Scheduled      metFORMIN 500 MG tablet  Commonly known as: GLUCOPHAGE   500 mg, 2 Times Daily With Meals      omeprazole 40 MG capsule  Commonly known as: priLOSEC   40 mg, 2 Times Daily      Ventolin  (90 Base) MCG/ACT inhaler  Generic drug: albuterol sulfate HFA   As Needed      vitamin B-12 1000 MCG tablet  Commonly known as: CYANOCOBALAMIN   1,000 mcg, Daily               Allergies   Allergen Reactions    Bee Venom Unknown - Low Severity    Dust Mite Extract Unknown - Low Severity    Prednisone Unknown - Low Severity    Sulfa Antibiotics Unknown - High Severity and Unknown - Low Severity         Discharge Disposition:  Rehab Facility or Unit (DC - External)    Discharge Diet:  Diet Order   Procedures    Diet: Cardiac; Healthy Heart (2-3 Na+); Fluid Consistency: Thin (IDDSI 0)       Discharge Activity:  Activity Instructions       Activity as Tolerated      Per orthopedics recommendations: Patient can WBAT with a walker.  They also recommend DVT PPx for 3-4 weeks    Measure Blood Pressure     Additional Activity Instructions:        Weight bearing as tolerated              CODE STATUS:    Code Status and Medical Interventions: No CPR (Do Not Attempt to Resuscitate); Limited Support; No intubation (DNI)   Ordered at: 04/10/25 9978     Code Status (Patient has no pulse and is not breathing):    No CPR (Do Not Attempt to Resuscitate)     Medical Interventions (Patient has pulse or is breathing):    Limited Support     Medical Intervention Limits:    No intubation (DNI)         Future Appointments   Date Time Provider Department Center  "  7/17/2025  1:15 PM Jenny Bond, APRN [facility]       Additional Instructions for the Follow-ups that You Need to Schedule       Discharge Follow-up with PCP   As directed       Currently Documented PCP:    Rosa Isela Bangura MD    PCP Phone Number:    [phone]     Follow Up Details: To be seen by provider at [facility] on arrival, PCP 1 week of discharge        Discharge Follow-up with Specialty: No follow-up required with orthopedics at this time.  Follow-up \"as needed\".   As directed      Specialty: No follow-up required with orthopedics at this time.  Follow-up \"as needed\".                Electronically signed by VALENTINA Freeman, 04/15/25, 10:34 AM EDT.      Time Spent on Discharge: I spent 40 minutes on this discharge activity which included: face-to-face encounter with the patient, reviewing the data in the system, coordination of the care with the nursing staff as well as consultants, documentation, and entering orders.      "

## 2025-04-15 NOTE — CASE MANAGEMENT/SOCIAL WORK
Case Management Discharge Note      Final Note: Patient's plan is acute rehab on [facility] at [facility] today 4/15. [facility] w/c will transport patient at 1700. Nurse to call report to [phone]. CM will fax discharge summary to [phone]. Patient is agreeable to plan. No further discharge needs identified.         Selected Continued Care - Admitted Since 4/10/2025       Destination Coordination complete.      Service Provider Services Address Phone Fax Patient Preferred    [facility] [address] [facility] [address] [address] [phone] --              Durable Medical Equipment    No services have been selected for the patient.                Dialysis/Infusion    No services have been selected for the patient.                Home Medical Care    No services have been selected for the patient.                Therapy    No services have been selected for the patient.                Community Resources    No services have been selected for the patient.                Community & DME    No services have been selected for the patient.                    Selected Continued Care - Prior Encounters Includes continued care and service providers with selected services from prior encounters from 1/10/2025 to 4/15/2025      Discharged on 4/6/2025 Admission date: 4/5/2025 - Discharge disposition: Home-Health Care [facility]      Home Medical Care       Service Provider Services Address Phone Fax Patient Preferred    [facility] [address] [facility], [address] [id number] [phone] --                          Transportation Services  W/C Van: Other ([facility] W/C transport)    Final Discharge Disposition Code: 62 - inpatient rehab facility

## 2025-04-15 NOTE — PLAN OF CARE
Goal Outcome Evaluation:  Plan of Care Reviewed With: patient        Progress: improving       Patient with stable VS, A&O x 4. Complained of mild pain. Otherwise uneventful shift.  Pt scheduled to transfer to [facility] on 4/15/25 at 1430.

## 2025-04-15 NOTE — DISCHARGE PLACEMENT REQUEST
"Paris Calderon (84 y.o. Female)      [facility] [phone]       Date of Birth   1940    Social Security Number       Address   [address] [place] [address]    Home Phone   [phone]    MRN   [id number]       Mu-ism   [employer]    Marital Status                               Admission Date   4/10/2025    Admission Type   Emergency    Admitting Provider   Trupti Villarreal DO    Attending Provider   Trupti Villarreal DO    Department, Room/Bed   [facility] 3H, S371/1       Discharge Date       Discharge Disposition   Rehab Facility or Unit (DC - External)    Discharge Destination                                 Attending Provider: Trupti Villarreal DO    Allergies: Bee Venom, Dust Mite Extract, Prednisone, Sulfa Antibiotics    Isolation: None   Infection: None   Code Status: No CPR    Ht: 144 cm (56.69\")   Wt: 53.3 kg (117 lb 6.4 oz)    Admission Cmt: None   Principal Problem: Closed nondisplaced fracture of pubis [S32.509A]                   Active Insurance as of 4/10/2025       Primary Coverage       Payor Plan Insurance Group Employer/Plan Group    MEDICARE MEDICARE A & B        Payor Plan Address Payor Plan Phone Number Payor Plan Fax Number Effective Dates    [address] [phone]  10/1/2007 - None Entered    [facility] [id number]         Subscriber Name Subscriber Birth Date Member ID       PARIS CALDERON 1940 [id number]               Secondary Coverage       Payor Plan Insurance Group Employer/Plan Group    [employer] COMMERCIAL A[employer] HE[employer] AND LIFE  SUP        PLAN G       Payor Plan Address Payor Plan Phone Number Payor Plan Fax Number Effective Dates    [address] [address]   12/1/2022 - None Entered    [facility] [id number]         Subscriber Name Subscriber Birth Date Member ID       PARIS CALDERON 1940 [id number]                     Emergency Contacts        (Rel.) Home Phone Work " Phone Mobile Phone    Viky Hayes (Daughter) [phone] -- [phone]    Alana Campbell (Daughter) -- -- [phone]                 Discharge Summary        Merissa Flores APRN at 04/15/25 0920       Attestation signed by Trupti Villarreal DO at 04/15/25 1104      I have reviewed this documentation and agree.                          [facility] Medicine Services  TRANSFER SUMMARY    Patient Name: Mary Irving  : 1940  MRN: [id number]    Date of Admission: 4/10/2025  Date of Discharge:  04/15/2025  Length of Stay: 1  Primary Care Physician: Rosa Isela Bangura MD    Consults       Date and Time Order Name Status Description    4/10/2025 10:31 PM Inpatient Orthopedic Surgery Consult Completed     2025  9:24 PM Inpatient Orthopedic Surgery Consult Completed             Hospital Course     Presenting Problem:   Pubic ramus fracture [S32.599A]    Active Hospital Problems    Diagnosis  POA    **Closed nondisplaced fracture of pubis [S32.509A]  Yes    Pubic ramus fracture [S32.599A]  Yes    Mixed hyperlipidemia [E78.2]  Yes    Hypothyroidism (acquired) [E03.9]  Yes    Primary hypertension [I10]  Yes      Resolved Hospital Problems   No resolved problems to display.          Hospital Course:  Mary Irving is a 84 y.o. female w/ HTN, HLD, hypothyroidism, anxiety, depression, just admitted 25-25 w/ RT ACL partial tear and medial meniscal tear, s/p intraarticular steroid injection; at home she had a ground level fall w/ hip pain; CT of the pelvis showed a nondisplaced RT inferior pubic rami fracture     Closed nondisplaced Rt pubic rami Fx 2/2 ground level fall  Recent Rt ACL partial tear and medial meniscus tear (nonsurgical)  -cont prn pain control  -seen by ortho, non-op; WBAT, use a walker, Dr. Peralta with orthopedics recs DVT ppx for 3-4 weeks  -currently on lovenox for ppx, depending on rehab facility could consider the same vs poss low  "dose eliquis or BID ASA 81mg at discharge  -PT/OT following.    -to [facility] for rehab today      DM2, A1c 6.7%, w/o insulin use - SSI    HTN/HLD - asa, statin, losartan    Hypothyroidism   - TSH 9.54  - levothyroxine was increased to 100 mcg this admission.  Continue.  - Follow-up PCP.  Recommend recheck TSH 4-6 weeks    Anxiety/Depression - wellbutrin, prozac, stable        Patient was seen resting up in bed in no acute distress.  Hemodynamically stable and afebrile.  Pain controlled on current medication regimen.  Tolerating diet.  No nausea or vomiting.  Eager to get to rehab today as planned.  Has been cleared by all services for transfer.    Discharge Follow Up Recommendations for outpatient labs/diagnostics:  Patient is cleared for transfer to rehab today bowel services.  Going on medications as below with follow-ups as noted.    --To be seen by provider at [facility] on arrival, PCP 1 week of discharge  -- No follow-up required with orthopedics at this time.  Follow-up \"as needed\".    Day of Discharge     HPI:   Patient was seen resting up in bed in no acute distress.  Awake and alert.  No acute distress.  Tolerating diet.  Denies nausea or vomiting.  Rates pelvic pain 1/10 scale at rest but is much as 8/10 scale with movement.  Eager to get to rehab today.  No new issues expressed.    Review of Systems   Gen- No fevers, chills  CV- No chest pain, palpitations  Resp- No cough, dyspnea  GI- No N/V/D, abd pain    Vital Signs:   Temp:  [96.1 °F (35.6 °C)-98 °F (36.7 °C)] 98 °F (36.7 °C)  Heart Rate:  [67-76] 76  Resp:  [16] 16  BP: (123-151)/(72-87) 123/72     Physical Exam:  Constitutional: No acute distress, awake, alert.  Resting up in bed.  HENT: NCAT, mucous membranes moist  Respiratory: Clear to auscultation bilaterally, respiratory effort normal on room air.  Sats WNL.  Cardiovascular: RRR, no murmurs, rubs, or gallops  Gastrointestinal: Positive bowel sounds, soft, nontender, nondistended  Musculoskeletal: No " "bilateral ankle edema.  TEJEDA spontaneously.  Psychiatric: Appropriate affect, cooperative  Neurologic: Oriented x 3, nonfocal, speech clear and appropriate.  Follows commands.  Skin: No rashes      Pertinent Results     Results from last 7 days   Lab Units 04/11/25  0420 04/11/25  0419 04/10/25  1931   WBC 10*3/mm3 8.52  --  8.90   HEMOGLOBIN g/dL 13.4  --  15.0   HEMATOCRIT % 41.5  --  45.1   PLATELETS 10*3/mm3 324  --  364   SODIUM mmol/L  --  138 139   POTASSIUM mmol/L  --  4.1 4.4   CHLORIDE mmol/L  --  105 102   CO2 mmol/L  --  23.0 24.7   BUN mg/dL  --  13 12   CREATININE mg/dL  --  0.59 0.49*   GLUCOSE mg/dL  --  147* 112*   CALCIUM mg/dL  --  8.6 9.7     Results from last 7 days   Lab Units 04/10/25  1931   BILIRUBIN mg/dL 0.5   ALK PHOS U/L 177*   ALT (SGPT) U/L 16   AST (SGOT) U/L 21           Invalid input(s): \"TG\", \"LDLCALC\", \"LDLREALC\"  Results from last 7 days   Lab Units 04/11/25 0419   TSH uIU/mL 9.540*     Brief Urine Lab Results  (Last result in the past 365 days)        Color   Clarity   Blood   Leuk Est   Nitrite   Protein   CREAT   Urine HCG        04/05/25 1109 Yellow   Clear   Negative   Negative   Negative   Negative                   Microbiology Results Abnormal       None            Imaging Results (All)       Procedure Component Value Units Date/Time    CT Pelvis Without Contrast [[id number]] Collected: 04/10/25 1511     Updated: 04/10/25 1524    Narrative:      CT PELVIS WO CONTRAST    Date of Exam: 4/10/2025 2:41 PM EDT    Indication: Fall, right buttock pain.    Comparison: CT pelvis performed on 7/9/2023.    Technique: Axial CT images were obtained of the pelvis without contrast administration.  Reconstructed coronal and sagittal images were also obtained. Automated exposure control and iterative construction methods were used.      Findings:  There is a nondisplaced fracture through the right inferior pubic ramus. There are no additional acute bony abnormalities. The patient's right " hip arthroplasty is intact. There is narrowing, subchondral sclerosis, and spurring of the left hip joint,   pubic symphysis, and sacroiliac joints indicating arthritic changes. There are degenerative changes within the lower lumbar spine. There are atherosclerotic vascular calcifications in the pelvis. There is increased stool burden. The uterus is surgically   absent. There is no soft tissue hematoma.      Impression:      Impression:  1.Nondisplaced fracture through the right inferior pubic ramus.  2.The patient's right hip arthroplasty is intact.  3.Degenerative changes.  4.Additional findings as noted above.        Electronically Signed: Jose Rivera MD    4/10/2025 3:21 PM EDT    Workstation ID: [id number]            Results for orders placed during the hospital encounter of 11/10/23    Adult Transthoracic Echo Complete W/ Cont if Necessary Per Protocol    Interpretation Summary    Left ventricular ejection fraction appears to be greater than 70%.  grade 1 diastolic dysfunction    mild aortic valve insuficiency    Estimated right ventricular systolic pressure from tricuspid regurgitation is normal (<35 mmHg).          Discharge Details        Discharge Medications        New Medications        Instructions Start Date   acetaminophen 325 MG tablet  Commonly known as: TYLENOL   650 mg, Oral, Every 4 Hours PRN      enoxaparin sodium 40 MG/0.4ML solution prefilled syringe syringe  Commonly known as: LOVENOX   40 mg, Subcutaneous, Daily, DVT ppx to a total of 3-4 weeks per orthopedics recs then can dc   Start Date: April 16, 2025     HYDROcodone-acetaminophen 5-325 MG per tablet  Commonly known as: NORCO   1 tablet, Oral, Every 4 Hours PRN      Insulin Lispro 100 UNIT/ML injection  Commonly known as: humaLOG   2-7 Units, Subcutaneous, 4 Times Daily Before Meals & Nightly             Changes to Medications        Instructions Start Date   levothyroxine 100 MCG tablet  Commonly known as: SYNTHROID, LEVOTHROID  What  changed:   medication strength  how to take this  when to take this   100 mcg, Oral, Every Early Morning   Start Date: April 16, 2025            Continue These Medications        Instructions Start Date   armodafinil 150 MG tablet  Commonly known as: Nuvigil   150 mg, Oral, Daily      aspirin 81 MG EC tablet   81 mg, Daily      atorvastatin 40 MG tablet  Commonly known as: LIPITOR   40 mg, Daily      buPROPion  MG 24 hr tablet  Commonly known as: WELLBUTRIN XL   150 mg, Daily      Calcium 250 MG capsule   Daily      FLUoxetine 20 MG capsule  Commonly known as: PROzac   20 mg, Oral, Daily      IRON (FERROUS GLUCONATE) PO   Take  by mouth.      losartan 100 MG tablet  Commonly known as: COZAAR   100 mg, Oral, Every 24 Hours Scheduled      metFORMIN 500 MG tablet  Commonly known as: GLUCOPHAGE   500 mg, 2 Times Daily With Meals      omeprazole 40 MG capsule  Commonly known as: priLOSEC   40 mg, 2 Times Daily      Ventolin  (90 Base) MCG/ACT inhaler  Generic drug: albuterol sulfate HFA   As Needed      vitamin B-12 1000 MCG tablet  Commonly known as: CYANOCOBALAMIN   1,000 mcg, Daily               Allergies   Allergen Reactions    Bee Venom Unknown - Low Severity    Dust Mite Extract Unknown - Low Severity    Prednisone Unknown - Low Severity    Sulfa Antibiotics Unknown - High Severity and Unknown - Low Severity         Discharge Disposition:  Rehab Facility or Unit (DC - External)    Discharge Diet:  Diet Order   Procedures    Diet: Cardiac; Healthy Heart (2-3 Na+); Fluid Consistency: Thin (IDDSI 0)       Discharge Activity:  Activity Instructions       Activity as Tolerated      Per orthopedics recommendations: Patient can WBAT with a walker.  They also recommend DVT PPx for 3-4 weeks    Measure Blood Pressure     Additional Activity Instructions:        Weight bearing as tolerated              CODE STATUS:    Code Status and Medical Interventions: No CPR (Do Not Attempt to Resuscitate); Limited Support;  "No intubation (DNI)   Ordered at: 04/10/25 2231     Code Status (Patient has no pulse and is not breathing):    No CPR (Do Not Attempt to Resuscitate)     Medical Interventions (Patient has pulse or is breathing):    Limited Support     Medical Intervention Limits:    No intubation (DNI)         Future Appointments   Date Time Provider Department Center   7/17/2025  1:15 PM Jenny Bond, APRN [facility]       Additional Instructions for the Follow-ups that You Need to Schedule       Discharge Follow-up with PCP   As directed       Currently Documented PCP:    Rosa Isela Bangura MD    PCP Phone Number:    [phone]     Follow Up Details: To be seen by provider at [facility] on arrival, PCP 1 week of discharge        Discharge Follow-up with Specialty: No follow-up required with orthopedics at this time.  Follow-up \"as needed\".   As directed      Specialty: No follow-up required with orthopedics at this time.  Follow-up \"as needed\".                Electronically signed by VALENTINA Freeman, 04/15/25, 10:34 AM EDT.      Time Spent on Discharge: I spent 40 minutes on this discharge activity which included: face-to-face encounter with the patient, reviewing the data in the system, coordination of the care with the nursing staff as well as consultants, documentation, and entering orders.        Electronically signed by Trupti Villarreal DO at 04/15/25 1104       "

## 2025-04-20 LAB
FUNGUS WND CULT: NORMAL
MYCOBACTERIUM SPEC CULT: NORMAL
NIGHT BLUE STAIN TISS: NORMAL

## 2025-04-24 ENCOUNTER — PATIENT OUTREACH (OUTPATIENT)
Dept: CASE MANAGEMENT | Facility: OTHER | Age: 85
End: 2025-04-24
Payer: MEDICARE

## 2025-04-24 NOTE — OUTREACH NOTE
AMBULATORY CASE MANAGEMENT NOTE    Names and Relationships of Patient/Support Persons: Contact: Cardinal Amin; Relationship:  -     SNF Follow-up    Questions/Answers      Flowsheet Row Responses   Acute Facility Discharged From [facility]   Acute Discharge Date 04/15/25   Name of the Skilled Nursing Facility? [facility]   Progression of Patient? Called and lvm for CM on GRU.          Nena HARTMANN  Ambulatory Case Management    4/24/2025, 15:09 EDT

## 2025-04-27 LAB
FUNGUS WND CULT: NORMAL
MYCOBACTERIUM SPEC CULT: NORMAL
NIGHT BLUE STAIN TISS: NORMAL

## 2025-05-04 LAB
FUNGUS WND CULT: NORMAL
MYCOBACTERIUM SPEC CULT: NORMAL
NIGHT BLUE STAIN TISS: NORMAL

## 2025-05-11 LAB
FUNGUS WND CULT: NORMAL
MYCOBACTERIUM SPEC CULT: NORMAL
NIGHT BLUE STAIN TISS: NORMAL

## 2025-05-16 ENCOUNTER — PATIENT OUTREACH (OUTPATIENT)
Dept: CASE MANAGEMENT | Facility: OTHER | Age: 85
End: 2025-05-16
Payer: MEDICARE

## 2025-05-16 NOTE — OUTREACH NOTE
AMBULATORY CASE MANAGEMENT NOTE    Names and Relationships of Patient/Support Persons: Contact: Cardinal Amin; Relationship:  -     SNF Follow-up    Questions/Answers      Flowsheet Row Responses   [facility]   Acute Discharge Date 04/15/25   Name of the Skilled Nursing Facility? [facility]   Progression of Patient? Spoke with staff on GRU. Pt was discharged home on 5/1.   Skilled Nursing Discharge Date? 05/01/25   Where was the patient discharged to? Home   Home Health Agency at discharge? Yes   Name of Home Health Agency? [facility]          Patient Outreach    Called pt for follow up since discharge from rehab. She denies any concerns or needs at this time. She has seen her pcp and is receiving HH. Reviewed fall precautions. She uses a medical alert device and walker.    Nena HARTMANN  Ambulatory Case Management    5/16/2025, 16:23 EDT

## 2025-05-18 LAB
FUNGUS WND CULT: NORMAL
MYCOBACTERIUM SPEC CULT: NORMAL
NIGHT BLUE STAIN TISS: NORMAL

## 2025-07-17 ENCOUNTER — OFFICE VISIT (OUTPATIENT)
Dept: SLEEP MEDICINE | Facility: CLINIC | Age: 85
End: 2025-07-17
Payer: MEDICARE

## 2025-07-17 VITALS
DIASTOLIC BLOOD PRESSURE: 68 MMHG | HEART RATE: 76 BPM | BODY MASS INDEX: 23.51 KG/M2 | OXYGEN SATURATION: 96 % | WEIGHT: 109 LBS | TEMPERATURE: 98.6 F | SYSTOLIC BLOOD PRESSURE: 126 MMHG | HEIGHT: 57 IN

## 2025-07-17 DIAGNOSIS — G47.10 HYPERSOMNIA WITH SLEEP APNEA: Primary | ICD-10-CM

## 2025-07-17 DIAGNOSIS — G47.30 HYPERSOMNIA WITH SLEEP APNEA: Primary | ICD-10-CM

## 2025-07-17 DIAGNOSIS — G47.33 OSA (OBSTRUCTIVE SLEEP APNEA): ICD-10-CM

## 2025-07-17 PROCEDURE — 3078F DIAST BP <80 MM HG: CPT | Performed by: NURSE PRACTITIONER

## 2025-07-17 PROCEDURE — 99213 OFFICE O/P EST LOW 20 MIN: CPT | Performed by: NURSE PRACTITIONER

## 2025-07-17 PROCEDURE — 3074F SYST BP LT 130 MM HG: CPT | Performed by: NURSE PRACTITIONER

## 2025-07-17 RX ORDER — MODAFINIL 100 MG/1
TABLET ORAL
COMMUNITY
Start: 2025-04-30

## 2025-07-17 NOTE — PROGRESS NOTES
Chief Complaint:   Chief Complaint   Patient presents with    Follow-up    Hypersomnia with sleep apnea       HPI:    Mary Irving is a 84 y.o. female here for follow-up of sleep apnea and hypersomnia with CPAP therapy.  Patient was last seen 1/16/2025.  Patient continues to do well with CPAP therapy and one half of 100 mg modafinil in the morning.  Patient is able to get 75 hours of sleep he goes to sleep easily.  She gets up for the restroom 2-3 times in the night.  Patient does well on the 1/2 tablet of modafinil and is able to complete all daily activities without difficulty.  She has no side effects from this medication.  She has no concerns or complaints today and will continue therapy.        Current medications are:   Current Outpatient Medications:     acetaminophen (TYLENOL) 325 MG tablet, Take 2 tablets by mouth Every 4 (Four) Hours As Needed for Mild Pain., Disp: , Rfl:     aspirin 81 MG EC tablet, Take 1 tablet by mouth Daily., Disp: , Rfl:     atorvastatin (LIPITOR) 40 MG tablet, 1 tablet Daily., Disp: , Rfl:     buPROPion XL (WELLBUTRIN XL) 150 MG 24 hr tablet, 1 tablet Daily., Disp: , Rfl:     Calcium 250 MG capsule, Daily., Disp: , Rfl:     FLUoxetine (PROzac) 20 MG capsule, Take 1 capsule by mouth Daily., Disp: , Rfl:     HYDROcodone-acetaminophen (NORCO) 5-325 MG per tablet, Take 1 tablet by mouth Every 4 (Four) Hours As Needed for Moderate Pain., Disp: , Rfl:     Insulin Lispro (humaLOG) 100 UNIT/ML injection, Inject 2-7 Units under the skin into the appropriate area as directed 4 (Four) Times a Day Before Meals & at Bedtime., Disp: , Rfl:     IRON, FERROUS GLUCONATE, PO, Take  by mouth., Disp: , Rfl:     levothyroxine (SYNTHROID, LEVOTHROID) 100 MCG tablet, Take 1 tablet by mouth Every Morning., Disp: , Rfl:     losartan (COZAAR) 100 MG tablet, Take 1 tablet by mouth Daily., Disp: 30 tablet, Rfl: 0    metFORMIN (GLUCOPHAGE) 500 MG tablet, Take 1 tablet by mouth 2 (Two) Times a Day With  "Meals., Disp: , Rfl:     modafinil (PROVIGIL) 100 MG tablet, Modafinil 100 MG Oral Tablet QTY: 30  Days: 30 Refills: 0  Written: 04/30/25 Patient Instructions:, Disp: , Rfl:     omeprazole (priLOSEC) 40 MG capsule, Take 1 capsule by mouth 2 (Two) Times a Day., Disp: , Rfl:     VENTOLIN  (90 BASE) MCG/ACT inhaler, As Needed., Disp: , Rfl:     vitamin B-12 (CYANOCOBALAMIN) 1000 MCG tablet, Take 1 tablet by mouth Daily., Disp: , Rfl: .      The patient's relevant past medical, surgical, family and social history were reviewed and updated in Epic as appropriate.       Review of Systems   Constitutional:  Positive for fatigue.   Eyes:  Positive for visual disturbance.   Respiratory:  Positive for apnea, cough and wheezing.    Cardiovascular:  Positive for palpitations.   Gastrointestinal:         Heartburn   Musculoskeletal:  Positive for arthralgias, back pain and gait problem.   Allergic/Immunologic: Positive for environmental allergies.   Neurological:  Positive for headaches.   Psychiatric/Behavioral:  Positive for sleep disturbance. The patient is nervous/anxious.    All other systems reviewed and are negative.        Objective:    Physical Exam  Constitutional:       Appearance: Normal appearance.   HENT:      Head: Normocephalic and atraumatic.      Mouth/Throat:      Comments: Class 2 airway  Cardiovascular:      Rate and Rhythm: Normal rate and regular rhythm.   Pulmonary:      Effort: Pulmonary effort is normal.      Breath sounds: Normal breath sounds.   Skin:     General: Skin is warm and dry.   Neurological:      Mental Status: She is alert and oriented to person, place, and time.   Psychiatric:         Mood and Affect: Mood normal.         Behavior: Behavior normal.         Thought Content: Thought content normal.         Judgment: Judgment normal.     /68   Pulse 76   Temp 98.6 °F (37 °C)   Ht 144 cm (56.69\")   Wt 49.4 kg (109 lb)   LMP  (LMP Unknown)   SpO2 96%   BMI 23.84 kg/m² "       CPAP Report    87/90 days of use  Greater than 4-hour use 96%  Setting 8-18  95th percentile pressure 14.2  AHI 4.0  The patient continues to use and benefit from CPAP therapy.    ASSESSMENT/PLAN    Diagnoses and all orders for this visit:    1. Hypersomnia with sleep apnea (Primary)    2. ZHAO (obstructive sleep apnea)        Counseled patient regarding multimodal approach with healthy nutrition, healthy sleep, regular physical activity, social activities, counseling, and medications. Encouraged to practice lateral sleep position. Avoid alcohol and sedatives close to bedtime.      Gary is up-to-date and compliant patient will let me know when she needs a refill.  Patient will continue CPAP use and I will see her back in 6 months.  Signed by  Jenny Bond, VALENTINA    July 17, 2025      CC: Rosa Isela Bangura MD         No ref. provider found

## 2025-08-25 ENCOUNTER — TELEPHONE (OUTPATIENT)
Dept: SLEEP MEDICINE | Age: 85
End: 2025-08-25
Payer: MEDICARE

## 2025-08-25 DIAGNOSIS — G47.10 HYPERSOMNIA: Primary | ICD-10-CM

## 2025-08-25 RX ORDER — MODAFINIL 100 MG/1
100 TABLET ORAL DAILY
Qty: 30 TABLET | Refills: 2 | Status: SHIPPED | OUTPATIENT
Start: 2025-08-25